# Patient Record
Sex: FEMALE | Race: WHITE | NOT HISPANIC OR LATINO | Employment: OTHER | URBAN - METROPOLITAN AREA
[De-identification: names, ages, dates, MRNs, and addresses within clinical notes are randomized per-mention and may not be internally consistent; named-entity substitution may affect disease eponyms.]

---

## 2017-02-01 ENCOUNTER — OFFICE VISIT (OUTPATIENT)
Dept: OBGYN | Facility: CLINIC | Age: 37
End: 2017-02-01
Payer: COMMERCIAL

## 2017-02-01 VITALS
WEIGHT: 155 LBS | BODY MASS INDEX: 26.46 KG/M2 | SYSTOLIC BLOOD PRESSURE: 112 MMHG | HEIGHT: 64 IN | DIASTOLIC BLOOD PRESSURE: 68 MMHG

## 2017-02-01 DIAGNOSIS — R53.83 FATIGUE, UNSPECIFIED TYPE: Primary | ICD-10-CM

## 2017-02-01 LAB
HGB BLD-MCNC: 14.5 G/DL (ref 11.7–15.7)
TSH SERPL DL<=0.05 MIU/L-ACNC: 0.94 MU/L (ref 0.4–4)

## 2017-02-01 PROCEDURE — 85018 HEMOGLOBIN: CPT | Performed by: OBSTETRICS & GYNECOLOGY

## 2017-02-01 PROCEDURE — 99213 OFFICE O/P EST LOW 20 MIN: CPT | Performed by: OBSTETRICS & GYNECOLOGY

## 2017-02-01 PROCEDURE — 36415 COLL VENOUS BLD VENIPUNCTURE: CPT | Performed by: OBSTETRICS & GYNECOLOGY

## 2017-02-01 PROCEDURE — 84443 ASSAY THYROID STIM HORMONE: CPT | Performed by: OBSTETRICS & GYNECOLOGY

## 2017-02-01 NOTE — PROGRESS NOTES
SUBJECTIVE:                                                   Janay Tam is a 36 year old female who presents to clinic today for the following health issue(s):  Patient presents with:  Thyroid Problem: Family history. Mom and sister has thyroid issues. Pt c/o fatigue, feeling down, left leg ache. Left breast ache.      Additional information:     HPI:  Having some fatigue that is more than usual. Sleeping well. No hair or GI changes.  Mood is a little down. No hx of depression. Thinks it may be the winter.   FH of thyroid disease in mom and sister.     Breast tenderness appears to be cyclic.    Patient's last menstrual period was 2017 (approximate)..   Patient is sexually active, .  Using vasectomy for contraception.    reports that she has never smoked. She has never used smokeless tobacco.    STD testing offered?  Declined    Health maintenance updated:  yes    Today's PHQ-2 Score:   PHQ-2 (  [employer]) 2016   Q1: Little interest or pleasure in doing things 0   Q2: Feeling down, depressed or hopeless 0   PHQ-2 Score 0     Today's PHQ-9 Score:   PHQ-9 SCORE 1/3/2012   Total Score 3     Today's MILA-7 Score: No flowsheet data found.    Problem list and histories reviewed & adjusted, as indicated.  Additional history: as documented.    Patient Active Problem List   Diagnosis     Allergic rhinitis     CARDIOVASCULAR SCREENING; LDL GOAL LESS THAN 160     Past Surgical History   Procedure Laterality Date     C iud,mirena  2010     No history of surgery        Social History   Substance Use Topics     Smoking status: Never Smoker      Smokeless tobacco: Never Used     Alcohol Use: No      Problem (# of Occurrences) Relation (Name,Age of Onset)    Arthritis (1) Paternal Grandmother    C.A.D. (2) Father: heart problems, Maternal Grandfather: heart problems    CEREBROVASCULAR DISEASE (2) Father, Paternal Grandmother    DIABETES (1) Maternal Grandfather    Depression (1) Mother     "Hyperlipidemia (1) Mother    Hypertension (1) Mother    Neurologic Disorder (2) Sister: mental health, Brother: mental health    OSTEOPOROSIS (1) Paternal Grandmother    Psychotic Disorder (1) Mother    Thyroid Disease (2) Mother, Sister            Current Outpatient Prescriptions   Medication Sig     Omega-3 Fatty Acids (FISH OIL PO)      MULTIVITAMINS OR TABS ONE DAILY     No current facility-administered medications for this visit.     Facility-Administered Medications Ordered in Other Visits   Medication     lidocaine-EPINEPHrine 1.5 %-1:[id number] injection     ROPivacaine (NAROPIN) injection     Allergies   Allergen Reactions     Penicillins        ROS:  12 point review of systems negative other than symptoms noted below.  Constitutional: Fatigue  Musculoskeletal: Muscle Cramps  Endocrine: Decreased Libido  Psychiatric: Depression    OBJECTIVE:     /68 mmHg  Ht 5' 4\" (1.626 m)  Wt 155 lb (70.308 kg)  BMI 26.59 kg/m2  LMP 01/20/2017 (Approximate)  Body mass index is 26.59 kg/(m^2).    Exam:  Constitutional:  Appearance: Well nourished, well developed alert, in no acute distress  Neurologic/Psychiatric:  Mental Status:  Oriented X3      In-Clinic Test Results:  Results for orders placed or performed in visit on 02/01/17 (from the past 24 hour(s))   Hemoglobin   Result Value Ref Range    Hemoglobin 14.5 11.7 - 15.7 g/dL       ASSESSMENT/PLAN:                                                        ICD-10-CM    1. Fatigue, unspecified type R53.83 Omega-3 Fatty Acids (FISH OIL PO)     TSH     Hemoglobin         Check labs. Observe mood for now. If no better in spring re-evaluate.  Mastalgia being cyclic is benign.       Sanjeev Pozo MD  [facility]    "

## 2017-02-01 NOTE — MR AVS SNAPSHOT
"              After Visit Summary   2/1/2017    Janay Tam    MRN: [id number]           Patient Information     Date Of Birth          1980        Visit Information        Provider Department      2/1/2017 10:00 AM Sanjeev Pozo MD [facility]        Today's Diagnoses     Fatigue, unspecified type    -  1        Follow-ups after your visit        Who to contact     If you have questions or need follow up information about today's clinic visit or your schedule please contact [facility] directly at [phone].  Normal or non-critical lab and imaging results will be communicated to you by BrandMe crowdmarketinghart, letter or phone within 4 business days after the clinic has received the results. If you do not hear from us within 7 days, please contact the clinic through Snapvinet or phone. If you have a critical or abnormal lab result, we will notify you by phone as soon as possible.  Submit refill requests through BlackStratus or call your pharmacy and they will forward the refill request to us. Please allow 3 business days for your refill to be completed.          Additional Information About Your Visit        MyChart Information     BlackStratus gives you secure access to your electronic health record. If you see a primary care provider, you can also send messages to your care team and make appointments. If you have questions, please call your primary care clinic.  If you do not have a primary care provider, please call [phone] and they will assist you.        Care EveryWhere ID     This is your Care EveryWhere ID. This could be used by other organizations to access your [place] medical records  [id number]        Your Vitals Were     Height BMI (Body Mass Index) Last Period             5' 4\" (1.626 m) 26.59 kg/m2 01/20/2017 (Approximate)          Blood Pressure from Last 3 Encounters:   02/01/17 112/68   12/06/16 108/68   05/31/16 106/70    Weight from Last 3 Encounters: "   02/01/17 155 lb (70.308 kg)   12/06/16 153 lb (69.4 kg)   05/31/16 145 lb 9.6 oz (66.044 kg)              We Performed the Following     Hemoglobin     TSH          Today's Medication Changes          These changes are accurate as of: 2/1/17 10:25 AM.  If you have any questions, ask your nurse or doctor.               Stop taking these medicines if you haven't already. Please contact your care team if you have questions.     nystatin-triamcinolone cream   Commonly known as:  MYCOLOG II   Stopped by:  Sanjeev Pozo MD                    Primary Care Provider Office Phone # Fax #    Eileen Barragan [phone]       [facility] [address] [id number]        Thank you!     Thank you for choosing [facility] [place]  for your care. Our goal is always to provide you with excellent care. Hearing back from our patients is one way we can continue to improve our services. Please take a few minutes to complete the written survey that you may receive in the mail after your visit with us. Thank you!             Your Updated Medication List - Protect others around you: Learn how to safely use, store and throw away your medicines at www.disposemymeds.org.          This list is accurate as of: 2/1/17 10:25 AM.  Always use your most recent med list.                   Brand Name Dispense Instructions for use    FISH OIL PO          multivitamin per tablet     100    ONE DAILY

## 2017-05-25 ENCOUNTER — OFFICE VISIT (OUTPATIENT)
Dept: OBGYN | Facility: CLINIC | Age: 37
End: 2017-05-25
Payer: COMMERCIAL

## 2017-05-25 VITALS
WEIGHT: 151.8 LBS | SYSTOLIC BLOOD PRESSURE: 116 MMHG | DIASTOLIC BLOOD PRESSURE: 76 MMHG | BODY MASS INDEX: 25.92 KG/M2 | HEIGHT: 64 IN

## 2017-05-25 DIAGNOSIS — B37.31 VAGINAL CANDIDA: ICD-10-CM

## 2017-05-25 DIAGNOSIS — Z01.419 ENCOUNTER FOR GYNECOLOGICAL EXAMINATION WITHOUT ABNORMAL FINDING: Primary | ICD-10-CM

## 2017-05-25 DIAGNOSIS — N89.8 ITCHING OF VAGINA: ICD-10-CM

## 2017-05-25 LAB
MICRO REPORT STATUS: ABNORMAL
SPECIMEN SOURCE: ABNORMAL
WET PREP SPEC: ABNORMAL

## 2017-05-25 PROCEDURE — G0145 SCR C/V CYTO,THINLAYER,RESCR: HCPCS | Performed by: NURSE PRACTITIONER

## 2017-05-25 PROCEDURE — 99395 PREV VISIT EST AGE 18-39: CPT | Performed by: NURSE PRACTITIONER

## 2017-05-25 PROCEDURE — 87210 SMEAR WET MOUNT SALINE/INK: CPT | Performed by: NURSE PRACTITIONER

## 2017-05-25 RX ORDER — FLUCONAZOLE 150 MG/1
150 TABLET ORAL ONCE
Qty: 1 TABLET | Refills: 0 | Status: SHIPPED | OUTPATIENT
Start: 2017-05-25 | End: 2017-05-25

## 2017-05-25 ASSESSMENT — ANXIETY QUESTIONNAIRES
1. FEELING NERVOUS, ANXIOUS, OR ON EDGE: SEVERAL DAYS
6. BECOMING EASILY ANNOYED OR IRRITABLE: SEVERAL DAYS
GAD7 TOTAL SCORE: 7
3. WORRYING TOO MUCH ABOUT DIFFERENT THINGS: SEVERAL DAYS
IF YOU CHECKED OFF ANY PROBLEMS ON THIS QUESTIONNAIRE, HOW DIFFICULT HAVE THESE PROBLEMS MADE IT FOR YOU TO DO YOUR WORK, TAKE CARE OF THINGS AT HOME, OR GET ALONG WITH OTHER PEOPLE: NOT DIFFICULT AT ALL
2. NOT BEING ABLE TO STOP OR CONTROL WORRYING: SEVERAL DAYS
7. FEELING AFRAID AS IF SOMETHING AWFUL MIGHT HAPPEN: SEVERAL DAYS
5. BEING SO RESTLESS THAT IT IS HARD TO SIT STILL: SEVERAL DAYS

## 2017-05-25 ASSESSMENT — PATIENT HEALTH QUESTIONNAIRE - PHQ9: 5. POOR APPETITE OR OVEREATING: SEVERAL DAYS

## 2017-05-25 NOTE — PROGRESS NOTES
Janay is a 36 year old  female who presents for annual exam.     Besides routine health maintenance,  she would like to discuss vaginal itching.    HPI:  The patient's PCP is  Eileen Barragan DO.  Pt here for her annual exam today. She still struggles with left breast tenderness that seems to be better with limiting her caffeine, under wire bras and she thinks it is cyclic. She also suffers from menstrual headaches, PMS and vaginal itching with her menses.     She had a Mirena IUD years ago. Her  has had a vasectomy.    GYNECOLOGIC HISTORY:    Patient's last menstrual period was 2017 (exact date).  Her current contraception method is: vasectomy.  She  reports that she has never smoked. She has never used smokeless tobacco.    Patient is sexually active.  STD testing offered?  Declined  Last PHQ-9 score on record =   PHQ-9 SCORE 2017   Total Score -   Total Score 2     Last GAD7 score on record =   MILA-7 SCORE 2017   Total Score 7     Alcohol Score = 3    HEALTH MAINTENANCE:  Cholesterol: (  Cholesterol   Date Value Ref Range Status   2005 148 0 - 200 mg/dL Final     Comment:     Cholesterol Reference Range:   <200  The NCEP recommends further         evaluation of:         1.  Patients with cholesterol             greater than 200 mg/dL             if additional risk factors             are present.         2.  All patients with a             cholesterol greater than             240 mg/dL.      Last Mammo: 2016, Result: normal, Next Mammo: age 39   Pap: (  Lab Results   Component Value Date    PAP NIL 2014    PAP NIL 2012    PAP NIL 10/21/2009    )   Colonoscopy:  NA, Result: not applicable, Next Colonoscopy: age 49  Dexa:  NA    Health maintenance updated:  yes    HISTORY:  Obstetric History       T3      L3     SAB0   TAB0   Ectopic0   Multiple0   Live Births0       # Outcome Date GA Lbr John/2nd Weight Sex Delivery Anes PTL Lv   3 Term 02/10/15 38w5d  "01:15 / 03:05 8 lb 10 oz (3.912 kg) M Vag-Spont EPI  EDU      Apgar1:  8                Apgar5: 9   2 Term 10/09/10 39w0d  7 lb 14 oz (3.572 kg) M  EPI  EDU   1 Term 10/31/08 38w0d  7 lb 4 oz (3.289 kg) M    EDU          Patient Active Problem List   Diagnosis     Allergic rhinitis     CARDIOVASCULAR SCREENING; LDL GOAL LESS THAN 160     Past Surgical History:   Procedure Laterality Date     C IUD,MIRENA  2010     NO HISTORY OF SURGERY        Social History   Substance Use Topics     Smoking status: Never Smoker     Smokeless tobacco: Never Used     Alcohol use No      Problem (# of Occurrences) Relation (Name,Age of Onset)    Arthritis (1) Paternal Grandmother    C.A.D. (2) Father: heart problems, Maternal Grandfather: heart problems    CEREBROVASCULAR DISEASE (2) Father, Paternal Grandmother    DIABETES (1) Maternal Grandfather    Depression (1) Mother    Hyperlipidemia (1) Mother    Hypertension (1) Mother    Neurologic Disorder (2) Sister: mental health, Brother: mental health    OSTEOPOROSIS (1) Paternal Grandmother    Psychotic Disorder (1) Mother    Thyroid Disease (2) Mother, Sister            Current Outpatient Prescriptions   Medication Sig     fluconazole (DIFLUCAN) 150 MG tablet Take 1 tablet (150 mg) by mouth once for 1 dose     Omega-3 Fatty Acids (FISH OIL PO)      MULTIVITAMINS OR TABS ONE DAILY     No current facility-administered medications for this visit.      Facility-Administered Medications Ordered in Other Visits   Medication     lidocaine-EPINEPHrine 1.5 %-1:479906 injection     ROPivacaine (NAROPIN) injection     Allergies   Allergen Reactions     Penicillins        Past medical, surgical, social and family histories were reviewed and updated in EPIC.    ROS:   12 point review of systems negative other than symptoms noted below.  Breast: Tenderness  Genitourinary: Vaginal Itching    EXAM:  /76  Ht 5' 4\" (1.626 m)  Wt 151 lb 12.8 oz (68.9 kg)  LMP 2017 (Exact Date) "  BMI 26.06 kg/m2   BMI: Body mass index is 26.06 kg/(m^2).    PHYSICAL EXAM:  Constitutional:  Appearance: Well nourished, well developed, alert, in no acute distress  Neck:  Lymph Nodes:  No lymphadenopathy present    Thyroid:  Gland size normal, nontender, no nodules or masses present  on palpation  Chest:  Respiratory Effort:  Breathing unlabored  Cardiovascular:    Heart: Auscultation:  Regular rate, normal rhythm, no murmurs present  Breasts: Inspection of Breasts:  No lymphadenopathy present    Palpation of Breasts and Axillae:  No masses present on palpation, no  breast tenderness    Axillary Lymph Nodes:  No lymphadenopathy present  Gastrointestinal:   Abdominal Examination:  Abdomen nontender to palpation, tone normal without rigidity or guarding, no masses present, umbilicus without lesions   Liver and Spleen:  No hepatomegaly present, liver nontender to palpation    Hernias:  No hernias present  Lymphatic: Lymph Nodes:  No other lymphadenopathy present  Skin:  General Inspection:  No rashes present, no lesions present, no areas of  discoloration    Genitalia and Groin:  No rashes present, no lesions present, no areas of  discoloration, no masses present  Neurologic/Psychiatric:    Mental Status:  Oriented X3     Pelvic Exam:  External Genitalia:     Normal appearance for age, no discharge present, no tenderness present, no inflammatory lesions present, color normal  Vagina:     Normal vaginal vault without central or paravaginal defects, no discharge present, no inflammatory lesions present, no masses present  Bladder:     Nontender to palpation  Urethra:   Urethral Body:  Urethra palpation normal, urethra structural support normal   Urethral Meatus:  No erythema or lesions present  Cervix:     Appearance healthy, no lesions present, nontender to palpation, no bleeding present  Uterus:     Nontender to palpation, no masses present, position anteflexed, mobility: normal  Adnexa:     No adnexal tenderness  present, no adnexal masses present  Perineum:     Perineum within normal limits, no evidence of trauma, no rashes or skin lesions present  Anus:     Anus within normal limits, no hemorrhoids present  Inguinal Lymph Nodes:     No lymphadenopathy present  Pubic Hair:     Normal pubic hair distribution for age  Genitalia and Groin:     No rashes present, no lesions present, no areas of discoloration, no masses present    COUNSELING:   Special attention given to:        Regular exercise       Healthy diet/nutrition       Cycle control    BMI: Body mass index is 26.06 kg/(m^2).  Weight management plan: Discussed healthy diet and exercise guidelines and patient will follow up in 12 months in clinic to re-evaluate.   Results for orders placed or performed in visit on 05/25/17   Wet prep   Result Value Ref Range    Specimen Description Vagina     Wet Prep (A)      Yeast seen  No clue cells seen  No Trichomonas seen      Micro Report Status FINAL 05/25/2017          ASSESSMENT:  36 year old female with satisfactory annual exam.    ICD-10-CM    1. Encounter for gynecological examination without abnormal finding Z01.419 Pap imaged thin layer screen reflex to HPV if ASCUS - recommended age 25 - 29 years   2. Itching of vagina L29.8 Wet prep   3. Vaginal candida B37.3 fluconazole (DIFLUCAN) 150 MG tablet       PLAN:  Annual pap done today. mammo at 39. Discussed cyclic breast tenderness, PMS, and vaginal itching, recommended pt have Mirena placed again for cycle control. She will schedule this.  Diflucan for vaginal yeast on wet prep.    OLIVIA Kee CNP

## 2017-05-25 NOTE — MR AVS SNAPSHOT
"              After Visit Summary   5/25/2017    Janay Tam    MRN: [id number]           Patient Information     Date Of Birth          1980        Visit Information        Provider Department      5/25/2017 11:30 AM Candace Gold APRN CNP [facility]        Today's Diagnoses     Encounter for gynecological examination without abnormal finding    -  1    Itching of vagina        Vaginal candida           Follow-ups after your visit        Who to contact     If you have questions or need follow up information about today's clinic visit or your schedule please contact [facility] directly at [phone].  Normal or non-critical lab and imaging results will be communicated to you by Catapult Healthhart, letter or phone within 4 business days after the clinic has received the results. If you do not hear from us within 7 days, please contact the clinic through Catapult Healthhart or phone. If you have a critical or abnormal lab result, we will notify you by phone as soon as possible.  Submit refill requests through Chevia or call your pharmacy and they will forward the refill request to us. Please allow 3 business days for your refill to be completed.          Additional Information About Your Visit        MyChart Information     Chevia gives you secure access to your electronic health record. If you see a primary care provider, you can also send messages to your care team and make appointments. If you have questions, please call your primary care clinic.  If you do not have a primary care provider, please call [phone] and they will assist you.        Care EveryWhere ID     This is your Care EveryWhere ID. This could be used by other organizations to access your [place] medical records  [id number]        Your Vitals Were     Height Last Period BMI (Body Mass Index)             5' 4\" (1.626 m) 04/20/2017 (Exact Date) 26.06 kg/m2          Blood Pressure from Last 3 Encounters: "   05/25/17 116/76   02/01/17 112/68   12/06/16 108/68    Weight from Last 3 Encounters:   05/25/17 151 lb 12.8 oz (68.9 kg)   02/01/17 155 lb (70.3 kg)   12/06/16 153 lb (69.4 kg)              We Performed the Following     Pap imaged thin layer screen reflex to HPV if ASCUS - recommended age 25 - 29 years     Wet prep          Today's Medication Changes          These changes are accurate as of: 5/25/17 12:04 PM.  If you have any questions, ask your nurse or doctor.               Start taking these medicines.        Dose/Directions    fluconazole 150 MG tablet   Commonly known as:  DIFLUCAN   Used for:  Vaginal candida   Started by:  Candace Gold APRN CNP        Dose:  150 mg   Take 1 tablet (150 mg) by mouth once for 1 dose   Quantity:  1 tablet   Refills:  0            Where to get your medicines      These medications were sent to [facility] - [place], [place] - [address] [facility]  [address] [facility] [address]     Phone:  [phone]     fluconazole 150 MG tablet                Primary Care Provider Office Phone # Fax #    Eileen Barragan [phone]       [facility] [address]  [facility] [address]        Thank you!     Thank you for choosing [facility] [place]  for your care. Our goal is always to provide you with excellent care. Hearing back from our patients is one way we can continue to improve our services. Please take a few minutes to complete the written survey that you may receive in the mail after your visit with us. Thank you!             Your Updated Medication List - Protect others around you: Learn how to safely use, store and throw away your medicines at www.disposemymeds.org.          This list is accurate as of: 5/25/17 12:04 PM.  Always use your most recent med list.                   Brand Name Dispense Instructions for use    FISH OIL PO          fluconazole 150 MG tablet    DIFLUCAN    1 tablet    Take 1 tablet (150  mg) by mouth once for 1 dose       multivitamin per tablet     100    ONE DAILY

## 2017-05-26 ASSESSMENT — ANXIETY QUESTIONNAIRES: GAD7 TOTAL SCORE: 7

## 2017-05-26 ASSESSMENT — PATIENT HEALTH QUESTIONNAIRE - PHQ9: SUM OF ALL RESPONSES TO PHQ QUESTIONS 1-9: 2

## 2017-05-30 LAB
COPATH REPORT: NORMAL
PAP: NORMAL

## 2017-07-14 ENCOUNTER — OFFICE VISIT (OUTPATIENT)
Dept: OBGYN | Facility: CLINIC | Age: 37
End: 2017-07-14
Payer: COMMERCIAL

## 2017-07-14 VITALS
HEIGHT: 64 IN | DIASTOLIC BLOOD PRESSURE: 70 MMHG | BODY MASS INDEX: 25.92 KG/M2 | WEIGHT: 151.8 LBS | SYSTOLIC BLOOD PRESSURE: 94 MMHG

## 2017-07-14 DIAGNOSIS — Z01.812 PRE-PROCEDURE LAB EXAM: Primary | ICD-10-CM

## 2017-07-14 DIAGNOSIS — Z30.430 ENCOUNTER FOR IUD INSERTION: ICD-10-CM

## 2017-07-14 LAB — BETA HCG QUAL IFA URINE: NEGATIVE

## 2017-07-14 PROCEDURE — 84703 CHORIONIC GONADOTROPIN ASSAY: CPT | Performed by: OBSTETRICS & GYNECOLOGY

## 2017-07-14 PROCEDURE — 58300 INSERT INTRAUTERINE DEVICE: CPT | Performed by: OBSTETRICS & GYNECOLOGY

## 2017-07-14 NOTE — MR AVS SNAPSHOT
"              After Visit Summary   7/14/2017    Janay Tam    MRN: [id number]           Patient Information     Date Of Birth          1980        Visit Information        Provider Department      7/14/2017 8:40 AM Sanjeev Pozo MD [facility]        Today's Diagnoses     Pre-procedure lab exam    -  1    Encounter for IUD insertion           Follow-ups after your visit        Who to contact     If you have questions or need follow up information about today's clinic visit or your schedule please contact [facility] directly at [phone].  Normal or non-critical lab and imaging results will be communicated to you by ZoopShophart, letter or phone within 4 business days after the clinic has received the results. If you do not hear from us within 7 days, please contact the clinic through Stantumt or phone. If you have a critical or abnormal lab result, we will notify you by phone as soon as possible.  Submit refill requests through NetDevices or call your pharmacy and they will forward the refill request to us. Please allow 3 business days for your refill to be completed.          Additional Information About Your Visit        MyChart Information     NetDevices gives you secure access to your electronic health record. If you see a primary care provider, you can also send messages to your care team and make appointments. If you have questions, please call your primary care clinic.  If you do not have a primary care provider, please call [phone] and they will assist you.        Care EveryWhere ID     This is your Care EveryWhere ID. This could be used by other organizations to access your [place] medical records  [id number]        Your Vitals Were     Height Last Period BMI (Body Mass Index)             5' 4\" (1.626 m) 07/10/2017 (Exact Date) 26.06 kg/m2          Blood Pressure from Last 3 Encounters:   07/14/17 94/70   05/25/17 116/76   02/01/17 112/68    " Weight from Last 3 Encounters:   07/14/17 151 lb 12.8 oz (68.9 kg)   05/25/17 151 lb 12.8 oz (68.9 kg)   02/01/17 155 lb (70.3 kg)              We Performed the Following     Beta HCG qual IFA urine     HC LEVONORGESTREL IU 52MG 5 YR     INSERTION INTRAUTERINE DEVICE          Today's Medication Changes          These changes are accurate as of: 7/14/17  9:27 AM.  If you have any questions, ask your nurse or doctor.               Start taking these medicines.        Dose/Directions    levonorgestrel 20 MCG/24HR IUD   Commonly known as:  MIRENA (52 MG)   Used for:  Encounter for IUD insertion   Started by:  Sanjeev Pozo MD        Dose:  1 each   1 each (20 mcg) by Intrauterine route once for 1 dose   Quantity:  1 each   Refills:  0            Where to get your medicines      Some of these will need a paper prescription and others can be bought over the counter.  Ask your nurse if you have questions.     You don't need a prescription for these medications     levonorgestrel 20 MCG/24HR IUD                Primary Care Provider Office Phone # Fax #    Eileen MCKEON DO [place] [phone]       [facility] [address] [address]        Equal Access to Services     BRENNA HEART AH: Hadii mert rizzo hadasho Soomaali, waaxda luqadaha, qaybta kaalmada adeegyada, lionel bautista. So [facility] [phone].    ATENCIÓN: Si habla español, tiene a linton disposición servicios gratuitos de asistencia lingüística. Llame al [phone].    We comply with applicable federal civil rights laws and [place] laws. We do not discriminate on the basis of race, color, national origin, age, disability sex, sexual orientation or gender identity.            Thank you!     Thank you for choosing [facility]  for your care. Our goal is always to provide you with excellent care. Hearing back from our patients is one way we can continue to improve our services.  Please take a few minutes to complete the written survey that you may receive in the mail after your visit with us. Thank you!             Your Updated Medication List - Protect others around you: Learn how to safely use, store and throw away your medicines at www.disposemymeds.org.          This list is accurate as of: 7/14/17  9:27 AM.  Always use your most recent med list.                   Brand Name Dispense Instructions for use Diagnosis    FISH OIL PO       Fatigue, unspecified type       levonorgestrel 20 MCG/24HR IUD    MIRENA (52 MG)    1 each    1 each (20 mcg) by Intrauterine route once for 1 dose    Encounter for IUD insertion       multivitamin per tablet     100    ONE DAILY

## 2017-07-14 NOTE — PROGRESS NOTES
Pt having cyclic menstrual headache and PMS mood changes. Hx of Mirena in past with no cyclic symptoms. Unsure whether she had these same symptoms with spontaneous cycles years ago.  Explained lack of Ovarian suppression with IUD and possible continued ovulation. Pt declines OCPs so will see if this works.    INDICATIONS:                                                      Is a pregnancy test required: Yes.  Was it positive or negative?  Negative  Was a consent obtained?  Yes    Janay Tam is a 36 year old female,   who presents for insertion of an IUD. The risks, benefits and alternatives of IUD insertion were discussed in detail previously. She also has reviewed the product brochure.  She has elected to go ahead with the insertion  today and her questions were answered. Consent was signed. Her LMP began on 7/10/17 and was normal in duration and amount of flow. A pregnancy test was performed today:  Yes    Today's PHQ-2 Score:   PHQ-2 (  [employer]) 2016   Q1: Little interest or pleasure in doing things 0   Q2: Feeling down, depressed or hopeless 0   PHQ-2 Score 0       PROCEDURE:                                                      The pelvic exam revealed normal external genitalia. On bimanual exam the uterus was Anteverted and normal in size with no tenderness present. A speculum was inserted into the vagina and the cervix was visualized. The cervix was prepped with Betadine . The anterior lip of the cervix was grasped with a single toothed tenaculum. The uterus sounded to 8 cm. A Mirena IUD was then inserted without difficulty. The string was cut to 4 cm.  The patient experienced a mild amount of cramping.    POST PROCEDURE:                                                      She  tolerated the procedure well. There were no complications. Patient was discharged in stable condition.    Return to clinic in 5 weeks for IUD check.  Call if severe cramping, fever, abnormal bleeding, abnormal  discharge or pelvic pain develop.  Patient was counseled about the chance of irregular bleeding.    Sanjeev Pozo MD

## 2017-08-03 ENCOUNTER — TELEPHONE (OUTPATIENT)
Dept: NURSING | Facility: CLINIC | Age: 37
End: 2017-08-03

## 2017-08-03 NOTE — TELEPHONE ENCOUNTER
Pt callin in with questions regasrding spotting after having her Mirena IUD placed. She indicates tht she has noticed occasional breast achiness , cramps and spotting that she would consider a light period. Reviewed with the patient that it is probably the uterine lining/ body  getting use to the progesterone.She denies passing any clots and only changes her tampon once a day. I also explained to the the pt that this can last up to 3-4 months. Pt voiced understanding and she was instructed to cb if she should develop any heavy bleeding/pain or if she should have additional questions.

## 2017-08-08 ENCOUNTER — OFFICE VISIT (OUTPATIENT)
Dept: OBGYN | Facility: CLINIC | Age: 37
End: 2017-08-08
Payer: COMMERCIAL

## 2017-08-08 VITALS — BODY MASS INDEX: 26.09 KG/M2 | DIASTOLIC BLOOD PRESSURE: 70 MMHG | WEIGHT: 152 LBS | SYSTOLIC BLOOD PRESSURE: 108 MMHG

## 2017-08-08 DIAGNOSIS — N64.4 MASTALGIA: ICD-10-CM

## 2017-08-08 DIAGNOSIS — Z97.5 PRESENCE OF INTRAUTERINE CONTRACEPTIVE DEVICE: Primary | ICD-10-CM

## 2017-08-08 DIAGNOSIS — R14.0 BLOATING: ICD-10-CM

## 2017-08-08 DIAGNOSIS — N93.8 DUB (DYSFUNCTIONAL UTERINE BLEEDING): ICD-10-CM

## 2017-08-08 PROCEDURE — 99213 OFFICE O/P EST LOW 20 MIN: CPT | Performed by: OBSTETRICS & GYNECOLOGY

## 2017-08-08 NOTE — PROGRESS NOTES
SUBJECTIVE:                                                   Janay Tam is a 36 year old female who presents to clinic today for the following health issue(s):  Patient presents with:  IUD: bleeding, breast tenderness      Additional information: does not feel normal    HPI:  Having issues since IUD insertion last months.   Having bleeding, which can be heavy, cramping, bloating, mastalgia.  No PMS this month.  IUD inserted after menses. Pt still w/in a menstrual cycle.   Cramping mild.   Concerned now about possible risks of IUD  Hx of mirena with no side effects.      Patient's last menstrual period was 07/10/2017 (exact date)..   Patient is sexually active, .  Using IUD for contraception.    reports that she has never smoked. She has never used smokeless tobacco.      STD testing offered?  Declined    Health maintenance updated:  yes    Today's PHQ-2 Score:   PHQ-2 (  [employer]) 2016   Q1: Little interest or pleasure in doing things 0   Q2: Feeling down, depressed or hopeless 0   PHQ-2 Score 0     Today's PHQ-9 Score:   PHQ-9 SCORE 2017   Total Score -   Total Score 2     Today's MILA-7 Score:   MILA-7 SCORE 2017   Total Score 7       Problem list and histories reviewed & adjusted, as indicated.  Additional history: as documented.    Patient Active Problem List   Diagnosis     Allergic rhinitis     CARDIOVASCULAR SCREENING; LDL GOAL LESS THAN 160     Past Surgical History:   Procedure Laterality Date     C IUD,MIRENA  2010     NO HISTORY OF SURGERY        Social History   Substance Use Topics     Smoking status: Never Smoker     Smokeless tobacco: Never Used     Alcohol use No      Problem (# of Occurrences) Relation (Name,Age of Onset)    Arthritis (1) Paternal Grandmother    C.A.D. (2) Father: heart problems, Maternal Grandfather: heart problems    CEREBROVASCULAR DISEASE (2) Father, Paternal Grandmother    DIABETES (1) Maternal Grandfather    Depression (1) Mother     Hyperlipidemia (1) Mother    Hypertension (1) Mother    Neurologic Disorder (2) Sister: mental health, Brother: mental health    OSTEOPOROSIS (1) Paternal Grandmother    Psychotic Disorder (1) Mother    Thyroid Disease (2) Mother, Sister            Current Outpatient Prescriptions   Medication Sig     Omega-3 Fatty Acids (FISH OIL PO)      MULTIVITAMINS OR TABS ONE DAILY     levonorgestrel (MIRENA, 52 MG,) 20 MCG/24HR IUD 1 each (20 mcg) by Intrauterine route once for 1 dose     No current facility-administered medications for this visit.      Facility-Administered Medications Ordered in Other Visits   Medication     lidocaine-EPINEPHrine 1.5 %-1:[id number] injection     ROPivacaine (NAROPIN) injection     Allergies   Allergen Reactions     Penicillins        ROS:  12 point review of systems negative other than symptoms noted below.  Breast: Tenderness  Gastrointestinal: Abdominal Pain and Bloating  Genitourinary: Cramps and Irregular Menses  Neurologic: Dizziness  Psychiatric: Difficulty Sleeping    OBJECTIVE:     /70  Wt 152 lb (68.9 kg)  LMP 07/10/2017 (Exact Date)  BMI 26.09 kg/m2  Body mass index is 26.09 kg/(m^2).    Exam:  Constitutional:  Appearance: Well nourished, well developed alert, in no acute distress  Neurologic/Psychiatric:  Mental Status:  Oriented X3   Pelvic Exam:  External Genitalia:     Normal appearance for age, no discharge present, no tenderness present, no inflammatory lesions present, color normal  Vagina:    Normal vaginal vault without central or paravaginal defects, no discharge present, no inflammatory lesions present, no masses present  Bladder:     Nontender to palpation  Urethra:   Urethral Body:  Urethra palpation normal, urethra structural support normal   Urethral Meatus:  No erythema or lesions present  Cervix:     Appearance healthy, no lesions present, nontender to palpation, no bleeding present, string present  Uterus:     Nontender to palpation, no masses present,  position anteflexed, mobility: normal  Adnexa:     No adnexal tenderness present, no adnexal masses present  Perineum:     Perineum within normal limits, no evidence of trauma, no rashes or skin lesions present  Anus:     Anus within normal limits, no hemorrhoids present  Inguinal Lymph Nodes:     No lymphadenopathy present  Pubic Hair:     Normal pubic hair distribution for age  Genitalia and Groin:     No rashes present, no lesions present, no areas of discoloration, no masses present       In-Clinic Test Results:  No results found for this or any previous visit (from the past 24 hour(s)).    ASSESSMENT/PLAN:                                                        ICD-10-CM    1. Presence of intrauterine contraceptive device Z97.5    2. DUB (dysfunctional uterine bleeding) N93.8    3. Bloating R14.0    4. Mastalgia N64.4        Reassured that spotting in first month is normal. Pt may or may not have ovulated this month. May be giving symptoms of bloating and mastalgia. Hx of mirena usage with no side effects. Most likely symptoms will improve this month.  Will observe for now and call if persists or worsens.  Reviewed potential benefits of IUD vs very low risks.    Sanjeev Pozo MD  [facility]

## 2017-08-08 NOTE — MR AVS SNAPSHOT
After Visit Summary   8/8/2017    Janay Tam    MRN: [id number]           Patient Information     Date Of Birth          1980        Visit Information        Provider Department      8/8/2017 11:50 AM Sanjeev Pozo MD [facility]        Today's Diagnoses     Presence of intrauterine contraceptive device    -  1    DUB (dysfunctional uterine bleeding)        Bloating        Mastalgia           Follow-ups after your visit        Who to contact     If you have questions or need follow up information about today's clinic visit or your schedule please contact [facility] directly at [phone].  Normal or non-critical lab and imaging results will be communicated to you by Voice123hart, letter or phone within 4 business days after the clinic has received the results. If you do not hear from us within 7 days, please contact the clinic through Voice123hart or phone. If you have a critical or abnormal lab result, we will notify you by phone as soon as possible.  Submit refill requests through [employer] or call your pharmacy and they will forward the refill request to us. Please allow 3 business days for your refill to be completed.          Additional Information About Your Visit        MyChart Information     [employer] gives you secure access to your electronic health record. If you see a primary care provider, you can also send messages to your care team and make appointments. If you have questions, please call your primary care clinic.  If you do not have a primary care provider, please call [phone] and they will assist you.        Care EveryWhere ID     This is your Care EveryWhere ID. This could be used by other organizations to access your [facility] medical records  [id number]        Your Vitals Were     Last Period BMI (Body Mass Index)                07/10/2017 (Exact Date) 26.09 kg/m2           Blood Pressure from Last 3 Encounters:   08/08/17  108/70   07/14/17 94/70   05/25/17 116/76    Weight from Last 3 Encounters:   08/08/17 152 lb (68.9 kg)   07/14/17 151 lb 12.8 oz (68.9 kg)   05/25/17 151 lb 12.8 oz (68.9 kg)              Today, you had the following     No orders found for display       Primary Care Provider Office Phone # Fax #    Eileen Barragan [phone]       [facility] [address] [facility]  [address]  [facility] [address]        Equal Access to Services     TOM HEART : Hadii aad ku hadasho Soomaali, waaxda luqadaha, qaybta kaalmada adeegyada, lionel bhatti haymirna ren . So [facility] [phone].    ATENCIÓN: Si habla español, tiene a linton disposición servicios gratuitos de asistencia lingüística. Llame al [phone].    We comply with applicable federal civil rights laws and [place] laws. We do not discriminate on the basis of race, color, national origin, age, disability sex, sexual orientation or gender identity.            Thank you!     Thank you for choosing [facility]  for your care. Our goal is always to provide you with excellent care. Hearing back from our patients is one way we can continue to improve our services. Please take a few minutes to complete the written survey that you may receive in the mail after your visit with us. Thank you!             Your Updated Medication List - Protect others around you: Learn how to safely use, store and throw away your medicines at www.disposemymeds.org.          This list is accurate as of: 8/8/17 12:32 PM.  Always use your most recent med list.                   Brand Name Dispense Instructions for use Diagnosis    FISH OIL PO       Fatigue, unspecified type       levonorgestrel 20 MCG/24HR IUD    MIRENA (52 MG)    1 each    1 each (20 mcg) by Intrauterine route once for 1 dose    Encounter for IUD insertion       multivitamin per tablet     100    ONE DAILY

## 2017-09-20 ENCOUNTER — OFFICE VISIT (OUTPATIENT)
Dept: OBGYN | Facility: CLINIC | Age: 37
End: 2017-09-20
Payer: COMMERCIAL

## 2017-09-20 VITALS — DIASTOLIC BLOOD PRESSURE: 60 MMHG | BODY MASS INDEX: 26.26 KG/M2 | WEIGHT: 153 LBS | SYSTOLIC BLOOD PRESSURE: 108 MMHG

## 2017-09-20 DIAGNOSIS — R30.0 DYSURIA: Primary | ICD-10-CM

## 2017-09-20 LAB
ALBUMIN UR-MCNC: NEGATIVE MG/DL
APPEARANCE UR: CLEAR
BILIRUB UR QL STRIP: NEGATIVE
COLOR UR AUTO: YELLOW
GLUCOSE UR STRIP-MCNC: NEGATIVE MG/DL
HGB UR QL STRIP: ABNORMAL
KETONES UR STRIP-MCNC: NEGATIVE MG/DL
LEUKOCYTE ESTERASE UR QL STRIP: ABNORMAL
NITRATE UR QL: NEGATIVE
NON-SQ EPI CELLS #/AREA URNS LPF: ABNORMAL /LPF
PH UR STRIP: 7 PH (ref 5–7)
RBC #/AREA URNS AUTO: ABNORMAL /HPF
SOURCE: ABNORMAL
SP GR UR STRIP: 1.01 (ref 1–1.03)
UROBILINOGEN UR STRIP-ACNC: 0.2 EU/DL (ref 0.2–1)
WBC #/AREA URNS AUTO: ABNORMAL /HPF

## 2017-09-20 PROCEDURE — 99213 OFFICE O/P EST LOW 20 MIN: CPT | Performed by: NURSE PRACTITIONER

## 2017-09-20 PROCEDURE — 81001 URINALYSIS AUTO W/SCOPE: CPT | Performed by: NURSE PRACTITIONER

## 2017-09-20 PROCEDURE — 87086 URINE CULTURE/COLONY COUNT: CPT | Performed by: NURSE PRACTITIONER

## 2017-09-20 RX ORDER — NITROFURANTOIN 25; 75 MG/1; MG/1
100 CAPSULE ORAL 2 TIMES DAILY
Qty: 10 CAPSULE | Refills: 0 | Status: SHIPPED | OUTPATIENT
Start: 2017-09-20 | End: 2018-05-09

## 2017-09-20 NOTE — PROGRESS NOTES
SUBJECTIVE:                                                   Janay Tam is a 37 year old female who presents to clinic today for the following health issue(s):  Patient presents with:  UTI      Additional information: has had UTI's in past, now with painful urination    HPI: Patient c/o frequency, urgency and dysuria with urination.  Denies any vaginal symptons.      Patient's last menstrual period was 2017 (approximate)..   Patient is sexually active, .  Using IUD for contraception.    reports that she has never smoked. She has never used smokeless tobacco.      STD testing offered?  Declined    Health maintenance updated:  yes    Today's PHQ-2 Score:   PHQ-2 (  [employer]) 2016   Q1: Little interest or pleasure in doing things 0   Q2: Feeling down, depressed or hopeless 0   PHQ-2 Score 0     Today's PHQ-9 Score:   PHQ-9 SCORE 2017   Total Score -   Total Score 2     Today's MILA-7 Score:   MILA-7 SCORE 2017   Total Score 7       Problem list and histories reviewed & adjusted, as indicated.  Additional history: as documented.    Patient Active Problem List   Diagnosis     Allergic rhinitis     CARDIOVASCULAR SCREENING; LDL GOAL LESS THAN 160     Past Surgical History:   Procedure Laterality Date     C IUD,MIRENA  2010     NO HISTORY OF SURGERY        Social History   Substance Use Topics     Smoking status: Never Smoker     Smokeless tobacco: Never Used     Alcohol use No      Problem (# of Occurrences) Relation (Name,Age of Onset)    Arthritis (1) Paternal Grandmother    C.A.D. (2) Father: heart problems, Maternal Grandfather: heart problems    CEREBROVASCULAR DISEASE (2) Father, Paternal Grandmother    DIABETES (1) Maternal Grandfather    Depression (1) Mother    Hyperlipidemia (1) Mother    Hypertension (1) Mother    Neurologic Disorder (2) Sister: mental health, Brother: mental health    OSTEOPOROSIS (1) Paternal Grandmother    Psychotic Disorder (1) Mother    Thyroid  Disease (2) Mother, Sister            Current Outpatient Prescriptions   Medication Sig     nitroFURantoin, macrocrystal-monohydrate, (MACROBID) 100 MG capsule Take 1 capsule (100 mg) by mouth 2 times daily     Omega-3 Fatty Acids (FISH OIL PO)      MULTIVITAMINS OR TABS ONE DAILY     levonorgestrel (MIRENA, 52 MG,) 20 MCG/24HR IUD 1 each (20 mcg) by Intrauterine route once for 1 dose     No current facility-administered medications for this visit.      Facility-Administered Medications Ordered in Other Visits   Medication     lidocaine-EPINEPHrine 1.5 %-1:128574 injection     ROPivacaine (NAROPIN) injection     Allergies   Allergen Reactions     Penicillins        ROS:  12 point review of systems negative other than symptoms noted below.  Genitourinary: Painful Urination and Urgency    OBJECTIVE:     /60  Wt 153 lb (69.4 kg)  LMP 09/01/2017 (Approximate)  BMI 26.26 kg/m2  Body mass index is 26.26 kg/(m^2).    Exam:  Constitutional:  Appearance: Well nourished, well developed alert, in no acute distress     In-Clinic Test Results:  Results for orders placed or performed in visit on 09/20/17 (from the past 24 hour(s))   UA with Microscopic   Result Value Ref Range    Color Urine Yellow     Appearance Urine Clear     Glucose Urine Negative NEG^Negative mg/dL    Bilirubin Urine Negative NEG^Negative    Ketones Urine Negative NEG^Negative mg/dL    Specific Gravity Urine 1.010 1.003 - 1.035    pH Urine 7.0 5.0 - 7.0 pH    Protein Albumin Urine Negative NEG^Negative mg/dL    Urobilinogen Urine 0.2 0.2 - 1.0 EU/dL    Nitrite Urine Negative NEG^Negative    Blood Urine Trace (A) NEG^Negative    Leukocyte Esterase Urine 1+ (A) NEG^Negative    Source Midstream Urine     WBC Urine O - 2 OTO2^O - 2 /HPF    RBC Urine O - 2 OTO2^O - 2 /HPF    Squamous Epithelial /LPF Urine Few FEW^Few /LPF     Discussed UA results.  ASSESSMENT/PLAN:                                                        ICD-10-CM    1. Dysuria R30.0 UA  with Microscopic     nitroFURantoin, macrocrystal-monohydrate, (MACROBID) 100 MG capsule     Urine Culture Aerobic Bacterial       There are no Patient Instructions on file for this visit.    Patient to increase water intake, cranberry juice.  Will start on macrobid.    Send UC.    OLIVIA Rodriguez [facility]

## 2017-09-20 NOTE — MR AVS SNAPSHOT
After Visit Summary   9/20/2017    Janay Tam    MRN: [id number]           Patient Information     Date Of Birth          1980        Visit Information        Provider Department      9/20/2017 11:00 AM Shayna Atkinson APRN CNP [facility]        Today's Diagnoses     Dysuria    -  1       Follow-ups after your visit        Follow-up notes from your care team     Return if symptoms worsen or fail to improve.      Who to contact     If you have questions or need follow up information about today's clinic visit or your schedule please contact [facility] directly at [phone].  Normal or non-critical lab and imaging results will be communicated to you by MyChart, letter or phone within 4 business days after the clinic has received the results. If you do not hear from us within 7 days, please contact the clinic through [employer] or phone. If you have a critical or abnormal lab result, we will notify you by phone as soon as possible.  Submit refill requests through [employer] or call your pharmacy and they will forward the refill request to us. Please allow 3 business days for your refill to be completed.          Additional Information About Your Visit        MyChart Information     [employer] gives you secure access to your electronic health record. If you see a primary care provider, you can also send messages to your care team and make appointments. If you have questions, please call your primary care clinic.  If you do not have a primary care provider, please call [phone] and they will assist you.        Care EveryWhere ID     This is your Care EveryWhere ID. This could be used by other organizations to access your [facility] medical records  [id number]        Your Vitals Were     Last Period BMI (Body Mass Index)                09/01/2017 (Approximate) 26.26 kg/m2           Blood Pressure from Last 3 Encounters:   09/20/17 108/60    08/08/17 108/70   07/14/17 94/70    Weight from Last 3 Encounters:   09/20/17 153 lb (69.4 kg)   08/08/17 152 lb (68.9 kg)   07/14/17 151 lb 12.8 oz (68.9 kg)              We Performed the Following     UA with Microscopic     Urine Culture Aerobic Bacterial          Today's Medication Changes          These changes are accurate as of: 9/20/17 11:09 AM.  If you have any questions, ask your nurse or doctor.               Start taking these medicines.        Dose/Directions    nitroFURantoin (macrocrystal-monohydrate) 100 MG capsule   Commonly known as:  MACROBID   Used for:  Dysuria   Started by:  Shayna Atkinson APRN CNP        Dose:  100 mg   Take 1 capsule (100 mg) by mouth 2 times daily   Quantity:  10 capsule   Refills:  0            Where to get your medicines      These medications were sent to [facility] - [place], [place] - [address]  [address] [facility] [address] [address]     Phone:  [phone]     nitroFURantoin (macrocrystal-monohydrate) 100 MG capsule                Primary Care Provider Office Phone # Fax #    Eileen Barragan [phone] [address]  [address]  [facility] [address]        Equal Access to Services     BRENNA HEART : Hadii mert ku hadasho Soomaali, waaxda luqadaha, qaybta kaalmada adeegyada, waxay idiin haygabrielan sidney bautista. So [facility] [phone].    ATENCIÓN: Si habla español, tiene a linton disposición servicios gratuitos de asistencia lingüística. Llame al [phone].    We comply with applicable federal civil rights laws and [place] laws. We do not discriminate on the basis of race, color, national origin, age, disability sex, sexual orientation or gender identity.            Thank you!     Thank you for choosing [facility]  for your care. Our goal is always to provide you with excellent care. Hearing back from our patients is one way we can continue to improve our services. Please take a few minutes to  complete the written survey that you may receive in the mail after your visit with us. Thank you!             Your Updated Medication List - Protect others around you: Learn how to safely use, store and throw away your medicines at www.disposemymeds.org.          This list is accurate as of: 9/20/17 11:09 AM.  Always use your most recent med list.                   Brand Name Dispense Instructions for use Diagnosis    FISH OIL PO       Fatigue, unspecified type       levonorgestrel 20 MCG/24HR IUD    MIRENA (52 MG)    1 each    1 each (20 mcg) by Intrauterine route once for 1 dose    Encounter for IUD insertion       multivitamin per tablet     100    ONE DAILY        nitroFURantoin (macrocrystal-monohydrate) 100 MG capsule    MACROBID    10 capsule    Take 1 capsule (100 mg) by mouth 2 times daily    Dysuria

## 2017-09-21 LAB
BACTERIA SPEC CULT: NO GROWTH
Lab: NORMAL
SPECIMEN SOURCE: NORMAL

## 2017-09-28 ENCOUNTER — ALLIED HEALTH/NURSE VISIT (OUTPATIENT)
Dept: NURSING | Facility: CLINIC | Age: 37
End: 2017-09-28
Payer: COMMERCIAL

## 2017-09-28 DIAGNOSIS — Z23 NEED FOR PROPHYLACTIC VACCINATION AND INOCULATION AGAINST INFLUENZA: Primary | ICD-10-CM

## 2017-09-28 PROCEDURE — 90471 IMMUNIZATION ADMIN: CPT

## 2017-09-28 PROCEDURE — 90686 IIV4 VACC NO PRSV 0.5 ML IM: CPT

## 2017-09-28 PROCEDURE — 99207 ZZC NO CHARGE NURSE ONLY: CPT

## 2017-09-28 NOTE — MR AVS SNAPSHOT
After Visit Summary   9/28/2017    Janay Tam    MRN: [id number]           Patient Information     Date Of Birth          1980        Visit Information        Provider Department      9/28/2017 10:00 AM UP [facility]LES NURSE [facility]        Today's Diagnoses     Need for prophylactic vaccination and inoculation against influenza    -  1       Follow-ups after your visit        Who to contact     If you have questions or need follow up information about today's clinic visit or your schedule please contact [facility] directly at [phone].  Normal or non-critical lab and imaging results will be communicated to you by [employer], letter or phone within 4 business days after the clinic has received the results. If you do not hear from us within 7 days, please contact the clinic through Hub[employer] or phone. If you have a critical or abnormal lab result, we will notify you by phone as soon as possible.  Submit refill requests through [employer] or call your pharmacy and they will forward the refill request to us. Please allow 3 business days for your refill to be completed.          Additional Information About Your Visit        MyChart Information     [employer] gives you secure access to your electronic health record. If you see a primary care provider, you can also send messages to your care team and make appointments. If you have questions, please call your primary care clinic.  If you do not have a primary care provider, please call [phone] and they will assist you.        Care EveryWhere ID     This is your Care EveryWhere ID. This could be used by other organizations to access your [facility] medical records  [id number]        Your Vitals Were     Last Period                   09/01/2017 (Approximate)            Blood Pressure from Last 3 Encounters:   09/20/17 108/60   08/08/17 108/70   07/14/17 94/70    Weight from Last 3 Encounters:   09/20/17 153 lb (69.4 kg)   08/08/17  152 lb (68.9 kg)   07/14/17 151 lb 12.8 oz (68.9 kg)              We Performed the Following     FLU VAC, SPLIT VIRUS IM > 3 YO (QUADRIVALENT) [73103]     Vaccine Administration, Initial [33487]        Primary Care Provider Office Phone # Fax #    Eileen Barragan [phone] [address] [address]        Equal Access to Services     [facility] : Hadii aad ku hadasho Soomaali, waaxda luqadaha, qaybta kaalmada adeegyada, waxay idiin hayaan adeeg kharamarybel lanaya . So [facility] [phone].    ATENCIÓN: Si peter tadeo, tiene a linton disposición servicios gratuitos de asistencia lingüística. Llame al [phone].    We comply with applicable federal civil rights laws and [place] laws. We do not discriminate on the basis of race, color, national origin, age, disability sex, sexual orientation or gender identity.            Thank you!     Thank you for choosing [facility] NURSE  for your care. Our goal is always to provide you with excellent care. Hearing back from our patients is one way we can continue to improve our services. Please take a few minutes to complete the written survey that you may receive in the mail after your visit with us. Thank you!             Your Updated Medication List - Protect others around you: Learn how to safely use, store and throw away your medicines at www.disposemymeds.org.          This list is accurate as of: 9/28/17 10:04 AM.  Always use your most recent med list.                   Brand Name Dispense Instructions for use Diagnosis    FISH OIL PO       Fatigue, unspecified type       levonorgestrel 20 MCG/24HR IUD    MIRENA (52 MG)    1 each    1 each (20 mcg) by Intrauterine route once for 1 dose    Encounter for IUD insertion       multivitamin per tablet     100    ONE DAILY        nitroFURantoin (macrocrystal-monohydrate) 100 MG capsule    MACROBID    10 capsule    Take 1 capsule (100 mg) by mouth 2 times daily    Dysuria

## 2017-09-28 NOTE — PROGRESS NOTES
Injectable Influenza Immunization Documentation    1.  Is the person to be vaccinated sick today?   No    2. Does the person to be vaccinated have an allergy to a component   of the vaccine?   No    3. Has the person to be vaccinated ever had a serious reaction   to influenza vaccine in the past?   No    4. Has the person to be vaccinated ever had Guillain-Barré syndrome?   No    Form completed by veronica sanchez

## 2018-05-08 ENCOUNTER — TELEPHONE (OUTPATIENT)
Dept: FAMILY MEDICINE | Facility: CLINIC | Age: 38
End: 2018-05-08

## 2018-05-08 NOTE — TELEPHONE ENCOUNTER
Reason for call:  Patient reporting a symptom    Symptom or request: burning stomach pain/ diarrhea    Duration (how long have symptoms been present): 2 weeks    Have you been treated for this before? No    Additional comments: concerned it might be ulcer    Phone Number patient can be reached at:  Cell number on file:    Telephone Information:   Mobile [phone]       Best Time:  anytime    Can we leave a detailed message on this number:  YES    Call taken on 5/8/2018 at 1:56 PM by Osmel Prieto

## 2018-05-08 NOTE — TELEPHONE ENCOUNTER
Patient states she's been having some stomach pain for about 2 weeks  Mid afternoon and after dinner is when pain occurs  States upper stomach burns at times too  States doesn't feel like heartburn though  Really painful after ingestion of coffee, fatty foods, or avocado  Having diarrhea also   Diarrhea happens every other day  One day has solid stools  Next day has about 5 stools where one may be solid and the others diarrhea  Denies blood in stool   Denies N&V  Pain right below ribs to center of [facility]  Advised appt for assessment, labs, etc    Next 5 appointments (look out 90 days)     May 09, 2018 10:00 AM CDT   Office Visit with Richelle Fernandez MD   [facility] ([facility])    [address] [facility] [address]   [phone]            Jun 01, 2018  9:30 AM CDT   PHYSICAL with Sanjeev Pozo MD   [facility]    [address] [place] [id number]   [phone]                Shila TAMAYO RN

## 2018-05-09 ENCOUNTER — OFFICE VISIT (OUTPATIENT)
Dept: FAMILY MEDICINE | Facility: CLINIC | Age: 38
End: 2018-05-09
Payer: COMMERCIAL

## 2018-05-09 VITALS
WEIGHT: 146.1 LBS | TEMPERATURE: 98.5 F | SYSTOLIC BLOOD PRESSURE: 104 MMHG | DIASTOLIC BLOOD PRESSURE: 60 MMHG | HEART RATE: 76 BPM | BODY MASS INDEX: 24.94 KG/M2 | HEIGHT: 64 IN

## 2018-05-09 DIAGNOSIS — F41.9 ANXIETY: ICD-10-CM

## 2018-05-09 DIAGNOSIS — F43.9 SITUATIONAL STRESS: ICD-10-CM

## 2018-05-09 DIAGNOSIS — K21.9 GASTROESOPHAGEAL REFLUX DISEASE WITHOUT ESOPHAGITIS: Primary | ICD-10-CM

## 2018-05-09 PROCEDURE — 99214 OFFICE O/P EST MOD 30 MIN: CPT | Performed by: FAMILY MEDICINE

## 2018-05-09 NOTE — MR AVS SNAPSHOT
After Visit Summary   5/9/2018    Janay Tam    MRN: [id number]           Patient Information     Date Of Birth          1980        Visit Information        Provider Department      5/9/2018 10:00 AM Richelle Fernandez MD [facility]        Today's Diagnoses     Gastroesophageal reflux disease without esophagitis    -  1    Anxiety        Situational stress          Care Instructions      GERD (Adult)    The esophagus is a tube that carries food from the mouth to the stomach. A valve at the lower end of the esophagus prevents stomach acid from flowing upward. When this valve doesn't work properly, stomach contents may repeatedly flow back up (reflux) into the esophagus. This is called gastroesophageal reflux disease (GERD). GERD can irritate the esophagus. It can cause problems with swallowing or breathing. In severe cases, GERD can cause recurrent pneumonia or other serious problems.  Symptoms of reflux include burning, pressure or sharp pain in the upper abdomen or mid to lower chest. The pain can spread to the neck, back, or shoulder. There may be belching, an acid taste in the back of the throat, chronic cough, or sore throat or hoarseness. GERD symptoms often occur during the day after a big meal. They can also occur at night when lying down.   Home care  Lifestyle changes can help reduce symptoms. If needed, medicines may be prescribed. Symptoms often improve with treatment, but if treatment is stopped, the symptoms often return after a few months. So most persons with GERD will need to continue treatment.  Lifestyle changes    Limit or avoid fatty, fried, and spicy foods, as well as coffee, chocolate, mint, and foods with high acid content such as tomatoes and citrus fruit and juices (orange, grapefruit, lemon).    Don t eat large meals, especially at night. Frequent, smaller meals are best. Do not lie down right after eating. And don t eat anything 3 hours before  "going to bed.    Avoid drinking alcohol and smoking. As much as possible, stay away from second hand smoke.    If you are overweight, losing weight will reduce symptoms.     Avoid wearing tight clothing around your stomach area.    If your symptoms occur during sleep, use a foam wedge to elevate your upper body (not just your head.) Or, place 4\" blocks under the head of your bed.  Medicines  If needed, medicines can help relieve the symptoms of GERD and prevent damage to the esophagus. Discuss a medicine plan with your healthcare provider. This may include one or more of the following medicines:    Antacids to help neutralize the normal acids in your stomach.    Acid blockers (H2 blockers) to decrease acid production.    Acid inhibitors (PPIs) to decrease acid production in a different way than the blockers. They may work better, but can take a little longer to take effect.  Take an antacid 30-60 minutes after eating and at bedtime, but not at the same time as an acid blocker.  Try not to take medicines such as ibuprofen and aspirin. If you are taking aspirin for your heart or other medical reasons, talk to your healthcare provider about stopping it.  Follow-up care  Follow up with your healthcare provider or as advised by our staff.  When to seek medical advice  Call your healthcare provider if any of the following occur:    Stomach pain gets worse or moves to the lower right abdomen (appendix area)    Chest pain appears or gets worse, or spreads to the back, neck, shoulder, or arm    Frequent vomiting (can t keep down liquids)    Blood in the stool or vomit (red or black in color)    Feeling weak or dizzy    Fever of 100.4 F (38 C) or higher, or as directed by your healthcare provider  Date Last Reviewed: 6/23/2015 2000-2017 [employer]. [address] [place] [address]. All rights reserved. This information is not intended as a substitute for professional medical care. Always follow your " healthcare professional's instructions.        Lifestyle Changes for Controlling GERD  When you have GERD, stomach acid feels as if it s backing up toward your mouth. Whether or not you take medicine to control your GERD, your symptoms can often be improved with lifestyle changes. Talk to your healthcare provider about the following suggestions. These suggestions may help you get relief from your symptoms.      Raise your head  Reflux is more likely to strike when you re lying down flat, because stomach fluid can flow backward more easily. Raising the head of your bed 4 to 6 inches can help. To do this:    Slide blocks or books under the legs at the head of your bed. Or, place a wedge under the mattress. Many foam Lalina can make a suitable wedge for you. The wedge should run from your waist to the top of your head.    Don t just prop your head on several pillows. This increases pressure on your stomach. It can make GERD worse.  Watch your eating habits  Certain foods may increase the acid in your stomach or relax the lower esophageal sphincter. This makes GERD more likely. It s best to avoid the following if they cause you symptoms:    Coffee, tea, and carbonated drinks (with and without caffeine)    Fatty, fried, or spicy food    Mint, chocolate, onions, and tomatoes    Peppermint    Any other foods that seem to irritate your stomach or cause you pain  Relieve the pressure  Tips include the following:    Eat smaller meals, even if you have to eat more often.    Don t lie down right after you eat. Wait a few hours for your stomach to empty.    Avoid tight belts and tight-fitting clothes.    Lose excess weight.  Tobacco and alcohol  Avoid smoking tobacco and drinking alcohol. They can make GERD symptoms worse.  Date Last Reviewed: 7/1/2016 2000-2017 [employer]. [address] [place] [address], [place] [address]. All rights reserved. This information is not intended as a substitute for professional medical  "care. Always follow your healthcare professional's instructions.                Follow-ups after your visit        Your next 10 appointments already scheduled     Jun 01, 2018  9:30 AM CDT   PHYSICAL with Sanjeev Pozo MD   [facility] ([facility])    [address] [place] [id number] [phone]              Who to contact     If you have questions or need follow up information about today's clinic visit or your schedule please contact [facility] directly at [phone].  Normal or non-critical lab and imaging results will be communicated to you by [employer], letter or phone within 4 business days after the clinic has received the results. If you do not hear from us within 7 days, please contact the clinic through [employer] or phone. If you have a critical or abnormal lab result, we will notify you by phone as soon as possible.  Submit refill requests through [employer] or call your pharmacy and they will forward the refill request to us. Please allow 3 business days for your refill to be completed.          Additional Information About Your Visit        MyChart Information     [employer] gives you secure access to your electronic health record. If you see a primary care provider, you can also send messages to your care team and make appointments. If you have questions, please call your primary care clinic.  If you do not have a primary care provider, please call [phone] and they will assist you.        Care EveryWhere ID     This is your Care EveryWhere ID. This could be used by other organizations to access your [facility] medical records  [id number]        Your Vitals Were     Pulse Temperature Height BMI (Body Mass Index)          76 98.5  F (36.9  C) (Tympanic) 5' 4\" (1.626 m) 25.08 kg/m2         Blood Pressure from Last 3 Encounters:   05/09/18 104/60   09/20/17 108/60   08/08/17 108/70    Weight from Last 3 Encounters: "   05/09/18 146 lb 1.6 oz (66.3 kg)   09/20/17 153 lb (69.4 kg)   08/08/17 152 lb (68.9 kg)              Today, you had the following     No orders found for display         Today's Medication Changes          These changes are accurate as of 5/9/18 10:32 AM.  If you have any questions, ask your nurse or doctor.               Start taking these medicines.        Dose/Directions    ranitidine 150 MG tablet   Commonly known as:  ZANTAC   Used for:  Gastroesophageal reflux disease without esophagitis   Started by:  Richelle Fernandez MD        Dose:  150 mg   Take 1 tablet (150 mg) by mouth 2 times daily   Quantity:  60 tablet   Refills:  1            Where to get your medicines      These medications were sent to [facility] - [place], [place] - [address] [facility]  [address] [facility] [address]     Phone:  [phone]     ranitidine 150 MG tablet                Primary Care Provider Office Phone # Fax #    Eileen LINDA Barragan [phone] [address] [address]        Equal Access to Services     [facility]: Hadii mert rizzo hadasho Sokarin, waaxda luqadaha, qaybta kaalmada adehossein, lionel ren . So [facility] [phone].    ATENCIÓN: Si habla español, tiene a linton disposición servicios gratuitos de asistencia lingüística. Llame al [phone].    We comply with applicable federal civil rights laws and [place] laws. We do not discriminate on the basis of race, color, national origin, age, disability, sex, sexual orientation, or gender identity.            Thank you!     Thank you for choosing [facility]  for your care. Our goal is always to provide you with excellent care. Hearing back from our patients is one way we can continue to improve our services. Please take a few minutes to complete the written survey that you may receive in the mail after your visit with us. Thank you!             Your Updated Medication List -  Protect others around you: Learn how to safely use, store and throw away your medicines at www.disposemymeds.org.          This list is accurate as of 5/9/18 10:32 AM.  Always use your most recent med list.                   Brand Name Dispense Instructions for use Diagnosis    FISH OIL PO       Fatigue, unspecified type       levonorgestrel 20 MCG/24HR IUD    MIRENA (52 MG)    1 each    1 each (20 mcg) by Intrauterine route once for 1 dose    Encounter for IUD insertion       multivitamin per tablet     100    ONE DAILY        ranitidine 150 MG tablet    ZANTAC    60 tablet    Take 1 tablet (150 mg) by mouth 2 times daily    Gastroesophageal reflux disease without esophagitis

## 2018-05-09 NOTE — PROGRESS NOTES
SUBJECTIVE:   Janay Tam is a 37 year old female who presents to clinic today for the following health issues:      Abdominal Pain      Duration: upper abd pain on and off, more so in past 2 weeks, also associated with being stressed and more  worrisome in past  Month    - middle son with more medical needs triggered the stomach symptoms and worsened the anxiety    Worried if has stomach ulcer    Often times fatty food bring it on- burning pain in middle / upper abdomen     Was eating healthy before- for weight loss challenge- but that ended in march and since then- diet not as healthy    History of anxiety/ has an established therapist, sees on and off , last visit  2 weeks ago but nothing  consistently    Description (location/character/radiation): Below ribs to center of abdomen, upper stomach  On and off        Associated flank pain: None    Intensity:  moderate    Accompanying signs and symptoms:        Fever/Chills: no        Gas/Bloating: YES       Nausea/vomitting: no        Diarrhea: YES,soft stools on and off- yesterday were 4, today ok- usually goes soft stool well formed twice daily        Dysuria or Hematuria: no     History (previous similar pain/trauma/previous testing): none    Precipitating or alleviating factors:       Pain worse with eating/BM/urination: worse after caffeine       Pain relieved by BM: YES    Therapies tried and outcome: TUMS with improvement    LMP:  IUD,       PROBLEMS TO ADD ON...    Problem list and histories reviewed & adjusted, as indicated.  Additional history: as documented    Patient Active Problem List   Diagnosis     Allergic rhinitis     CARDIOVASCULAR SCREENING; LDL GOAL LESS THAN 160     Anxiety     Gastroesophageal reflux disease without esophagitis     Past Surgical History:   Procedure Laterality Date     C IUD,MIRENA  12/2010     NO HISTORY OF SURGERY         Social History   Substance Use Topics     Smoking status: Never Smoker     Smokeless tobacco:  "Never Used     Alcohol use No     Family History   Problem Relation Age of Onset     C.A.D. Father      heart problems     CEREBROVASCULAR DISEASE Father      C.A.D. Maternal Grandfather      heart problems     DIABETES Maternal Grandfather      Hypertension Mother      Depression Mother      Psychotic Disorder Mother      Hyperlipidemia Mother      Thyroid Disease Mother      CEREBROVASCULAR DISEASE Paternal Grandmother      OSTEOPOROSIS Paternal Grandmother      Arthritis Paternal Grandmother      Neurologic Disorder Sister      mental health     Neurologic Disorder Brother      mental health     Thyroid Disease Sister            Reviewed and updated as needed this visit by clinical staff       Reviewed and updated as needed this visit by Provider         ROS:  Constitutional, HEENT, cardiovascular, pulmonary, gi and gu systems are negative, except as otherwise noted.    OBJECTIVE:     /60  Pulse 76  Temp 98.5  F (36.9  C) (Tympanic)  Ht 5' 4\" (1.626 m)  Wt 146 lb 1.6 oz (66.3 kg)  BMI 25.08 kg/m2  Body mass index is 25.08 kg/(m^2).  GENERAL: healthy, alert and no distress  NECK: no adenopathy, no asymmetry, masses, or scars and thyroid normal to palpation  RESP: lungs clear to auscultation - no rales, rhonchi or wheezes  CV: regular rate and rhythm, normal S1 S2, no S3 or S4, no murmur, click or rub, no peripheral edema and peripheral pulses strong  ABDOMEN: soft, nontender, no hepatosplenomegaly, no masses and bowel sounds normal  MS: no gross musculoskeletal defects noted, no edema    Diagnostic Test Results:  none     ASSESSMENT/PLAN:     1. Gastroesophageal reflux disease without esophagitis  -long discussion about maintaining food diary to avoid foods causing the symptoms  -avoid fatty foods, avoid late meals or lying down too quickly after meals  -ok to take tums as needed - that help  - ranitidine (ZANTAC) 150 MG tablet; Take 1 tablet (150 mg) by mouth 2 times daily  Dispense: 60 tablet; Refill: " 1, for 1-2 months and than ok to tae only as needed    -follow up with provider for unresolved or worsening symptoms   -Please see patient instructions     Diarrhea & stools better  However if persistent concern ? If having irritable bowel syndrome as well  2. Anxiety    Encouraged to add physical activity of choice  encouraged to increase weekly therapy - has an established therapist   & follow up with primary care physicain , if more concerns  Did try effexor 10-12 yrs ago- Does not intended to start medications    3. Situational stress  As above                   Richelle Fernandez MD  [facility]

## 2018-05-09 NOTE — PATIENT INSTRUCTIONS
"  GERD (Adult)    The esophagus is a tube that carries food from the mouth to the stomach. A valve at the lower end of the esophagus prevents stomach acid from flowing upward. When this valve doesn't work properly, stomach contents may repeatedly flow back up (reflux) into the esophagus. This is called gastroesophageal reflux disease (GERD). GERD can irritate the esophagus. It can cause problems with swallowing or breathing. In severe cases, GERD can cause recurrent pneumonia or other serious problems.  Symptoms of reflux include burning, pressure or sharp pain in the upper abdomen or mid to lower chest. The pain can spread to the neck, back, or shoulder. There may be belching, an acid taste in the back of the throat, chronic cough, or sore throat or hoarseness. GERD symptoms often occur during the day after a big meal. They can also occur at night when lying down.   Home care  Lifestyle changes can help reduce symptoms. If needed, medicines may be prescribed. Symptoms often improve with treatment, but if treatment is stopped, the symptoms often return after a few months. So most persons with GERD will need to continue treatment.  Lifestyle changes    Limit or avoid fatty, fried, and spicy foods, as well as coffee, chocolate, mint, and foods with high acid content such as tomatoes and citrus fruit and juices (orange, grapefruit, lemon).    Don t eat large meals, especially at night. Frequent, smaller meals are best. Do not lie down right after eating. And don t eat anything 3 hours before going to bed.    Avoid drinking alcohol and smoking. As much as possible, stay away from second hand smoke.    If you are overweight, losing weight will reduce symptoms.     Avoid wearing tight clothing around your stomach area.    If your symptoms occur during sleep, use a foam wedge to elevate your upper body (not just your head.) Or, place 4\" blocks under the head of your bed.  Medicines  If needed, medicines can help relieve " the symptoms of GERD and prevent damage to the esophagus. Discuss a medicine plan with your healthcare provider. This may include one or more of the following medicines:    Antacids to help neutralize the normal acids in your stomach.    Acid blockers (H2 blockers) to decrease acid production.    Acid inhibitors (PPIs) to decrease acid production in a different way than the blockers. They may work better, but can take a little longer to take effect.  Take an antacid 30-60 minutes after eating and at bedtime, but not at the same time as an acid blocker.  Try not to take medicines such as ibuprofen and aspirin. If you are taking aspirin for your heart or other medical reasons, talk to your healthcare provider about stopping it.  Follow-up care  Follow up with your healthcare provider or as advised by our staff.  When to seek medical advice  Call your healthcare provider if any of the following occur:    Stomach pain gets worse or moves to the lower right abdomen (appendix area)    Chest pain appears or gets worse, or spreads to the back, neck, shoulder, or arm    Frequent vomiting (can t keep down liquids)    Blood in the stool or vomit (red or black in color)    Feeling weak or dizzy    Fever of 100.4 F (38 C) or higher, or as directed by your healthcare provider  Date Last Reviewed: 6/23/2015 2000-2017 [employer]. [address] [place] [address], [place] [address]. All rights reserved. This information is not intended as a substitute for professional medical care. Always follow your healthcare professional's instructions.        Lifestyle Changes for Controlling GERD  When you have GERD, stomach acid feels as if it s backing up toward your mouth. Whether or not you take medicine to control your GERD, your symptoms can often be improved with lifestyle changes. Talk to your healthcare provider about the following suggestions. These suggestions may help you get relief from your symptoms.      Raise your  head  Reflux is more likely to strike when you re lying down flat, because stomach fluid can flow backward more easily. Raising the head of your bed 4 to 6 inches can help. To do this:    Slide blocks or books under the legs at the head of your bed. Or, place a wedge under the mattress. Many foam stores can make a suitable wedge for you. The wedge should run from your waist to the top of your head.    Don t just prop your head on several pillows. This increases pressure on your stomach. It can make GERD worse.  Watch your eating habits  Certain foods may increase the acid in your stomach or relax the lower esophageal sphincter. This makes GERD more likely. It s best to avoid the following if they cause you symptoms:    Coffee, tea, and carbonated drinks (with and without caffeine)    Fatty, fried, or spicy food    Mint, chocolate, onions, and tomatoes    Peppermint    Any other foods that seem to irritate your stomach or cause you pain  Relieve the pressure  Tips include the following:    Eat smaller meals, even if you have to eat more often.    Don t lie down right after you eat. Wait a few hours for your stomach to empty.    Avoid tight belts and tight-fitting clothes.    Lose excess weight.  Tobacco and alcohol  Avoid smoking tobacco and drinking alcohol. They can make GERD symptoms worse.  Date Last Reviewed: 7/1/2016 2000-2017 [employer]. [address] [place] [address], [place] [address]. All rights reserved. This information is not intended as a substitute for professional medical care. Always follow your healthcare professional's instructions.

## 2018-06-01 ENCOUNTER — OFFICE VISIT (OUTPATIENT)
Dept: OBGYN | Facility: CLINIC | Age: 38
End: 2018-06-01
Payer: COMMERCIAL

## 2018-06-01 VITALS
DIASTOLIC BLOOD PRESSURE: 66 MMHG | SYSTOLIC BLOOD PRESSURE: 102 MMHG | BODY MASS INDEX: 25.52 KG/M2 | HEART RATE: 80 BPM | HEIGHT: 63 IN | WEIGHT: 144 LBS

## 2018-06-01 DIAGNOSIS — Z30.432 ENCOUNTER FOR IUD REMOVAL: ICD-10-CM

## 2018-06-01 DIAGNOSIS — Z97.5 PRESENCE OF INTRAUTERINE CONTRACEPTIVE DEVICE: ICD-10-CM

## 2018-06-01 DIAGNOSIS — R68.82 DECREASED LIBIDO: ICD-10-CM

## 2018-06-01 DIAGNOSIS — Z01.419 ENCOUNTER FOR GYNECOLOGICAL EXAMINATION WITHOUT ABNORMAL FINDING: Primary | ICD-10-CM

## 2018-06-01 PROCEDURE — 99395 PREV VISIT EST AGE 18-39: CPT | Mod: 25 | Performed by: OBSTETRICS & GYNECOLOGY

## 2018-06-01 PROCEDURE — 99213 OFFICE O/P EST LOW 20 MIN: CPT | Mod: 25 | Performed by: OBSTETRICS & GYNECOLOGY

## 2018-06-01 PROCEDURE — 58301 REMOVE INTRAUTERINE DEVICE: CPT | Performed by: OBSTETRICS & GYNECOLOGY

## 2018-06-01 ASSESSMENT — ANXIETY QUESTIONNAIRES
GAD7 TOTAL SCORE: 5
2. NOT BEING ABLE TO STOP OR CONTROL WORRYING: NOT AT ALL
6. BECOMING EASILY ANNOYED OR IRRITABLE: SEVERAL DAYS
IF YOU CHECKED OFF ANY PROBLEMS ON THIS QUESTIONNAIRE, HOW DIFFICULT HAVE THESE PROBLEMS MADE IT FOR YOU TO DO YOUR WORK, TAKE CARE OF THINGS AT HOME, OR GET ALONG WITH OTHER PEOPLE: NOT DIFFICULT AT ALL
1. FEELING NERVOUS, ANXIOUS, OR ON EDGE: SEVERAL DAYS
5. BEING SO RESTLESS THAT IT IS HARD TO SIT STILL: NOT AT ALL
3. WORRYING TOO MUCH ABOUT DIFFERENT THINGS: SEVERAL DAYS
7. FEELING AFRAID AS IF SOMETHING AWFUL MIGHT HAPPEN: MORE THAN HALF THE DAYS

## 2018-06-01 ASSESSMENT — PATIENT HEALTH QUESTIONNAIRE - PHQ9: 5. POOR APPETITE OR OVEREATING: NOT AT ALL

## 2018-06-01 NOTE — MR AVS SNAPSHOT
After Visit Summary   6/1/2018    Janay Tam    MRN: [id number]           Patient Information     Date Of Birth          1980        Visit Information        Provider Department      6/1/2018 9:30 AM Sanjeev Pozo MD [facility]        Today's Diagnoses     Encounter for gynecological examination without abnormal finding    -  1       Follow-ups after your visit        Your next 10 appointments already scheduled     Jun 05, 2018  9:40 AM CDT   LAB with [facility]   [facility] ([facility])    [address] [place] [id number] [phone]           Please do not eat 10-12 hours before your appointment if you are coming in fasting for labs on lipids, cholesterol, or glucose (sugar). This does not apply to pregnant women. Water, hot tea and black coffee (with nothing added) are okay. Do not drink other fluids, diet soda or chew gum.              Who to contact     If you have questions or need follow up information about today's clinic visit or your schedule please contact [facility] directly at [phone].  Normal or non-critical lab and imaging results will be communicated to you by MyChart, letter or phone within 4 business days after the clinic has received the results. If you do not hear from us within 7 days, please contact the clinic through [employer] or phone. If you have a critical or abnormal lab result, we will notify you by phone as soon as possible.  Submit refill requests through [employer] or call your pharmacy and they will forward the refill request to us. Please allow 3 business days for your refill to be completed.          Additional Information About Your Visit        MyChart Information     [employer] gives you secure access to your electronic health record. If you see a primary care provider, you can also send messages to your care team and make appointments. If  "you have questions, please call your primary care clinic.  If you do not have a primary care provider, please call [phone] and they will assist you.        Care EveryWhere ID     This is your Care EveryWhere ID. This could be used by other organizations to access your [facility] medical records  [id number]        Your Vitals Were     Pulse Height Breastfeeding? BMI (Body Mass Index)          80 5' 3\" (1.6 m) No 25.51 kg/m2         Blood Pressure from Last 3 Encounters:   06/01/18 102/66   05/09/18 104/60   09/20/17 108/60    Weight from Last 3 Encounters:   06/01/18 144 lb (65.3 kg)   05/09/18 146 lb 1.6 oz (66.3 kg)   09/20/17 153 lb (69.4 kg)              Today, you had the following     No orders found for display       Primary Care Provider Office Phone # Fax #    Eileen LINDA Barragan [phone] [address] [address]        Equal Access to Services     [facility]: Hadii aad ku hadasho Soomaali, waaxda luqadaha, qaybta kaalmada adeegyada, waxbrea bhatti haymirna ren . So [facility] [phone].    ATENCIÓN: Si habla español, tiene a linton disposición servicios gratuitos de asistencia lingüística. Llame al [phone].    We comply with applicable federal civil rights laws and [place] laws. We do not discriminate on the basis of race, color, national origin, age, disability, sex, sexual orientation, or gender identity.            Thank you!     Thank you for choosing [facility]  for your care. Our goal is always to provide you with excellent care. Hearing back from our patients is one way we can continue to improve our services. Please take a few minutes to complete the written survey that you may receive in the mail after your visit with us. Thank you!             Your Updated Medication List - Protect others around you: Learn how to safely use, store and throw away your medicines at www.disposemymeds.org.          This list is " accurate as of 6/1/18 10:11 AM.  Always use your most recent med list.                   Brand Name Dispense Instructions for use Diagnosis    FISH OIL PO       Fatigue, unspecified type       levonorgestrel 20 MCG/24HR IUD    MIRENA (52 MG)    1 each    1 each (20 mcg) by Intrauterine route once for 1 dose    Encounter for IUD insertion       multivitamin per tablet     100    ONE DAILY        ranitidine 150 MG tablet    ZANTAC    60 tablet    Take 1 tablet (150 mg) by mouth 2 times daily    Gastroesophageal reflux disease without esophagitis

## 2018-06-01 NOTE — PROGRESS NOTES
Janay is a 37 year old  female who presents for annual exam.     Besides routine health maintenance,  she would like to discuss possible IUD removal.    HPI:  The patient's PCP is  Eileen Barragan DO.    Believes has decreased libido since IUD insertion. Has spotting about once a month. Really didn't like her normal cycles.   has VAS.  Needs Lipids.       GYNECOLOGIC HISTORY:    No LMP recorded. Patient is not currently having periods (Reason: IUD).  Her current contraception method is: IUD.  She  reports that she has never smoked. She has never used smokeless tobacco.    Patient is sexually active.  STD testing offered?  Declined  Last PHQ-9 score on record =   PHQ-9 SCORE 2018   Total Score -   Total Score 0     Last GAD7 score on record =   MILA-7 SCORE 2018   Total Score 5     Alcohol Score = 1    HEALTH MAINTENANCE:  Cholesterol:   Cholesterol   Date Value Ref Range Status   2005 148 0 - 200 mg/dL Final     Comment:     Cholesterol Reference Range:   <200  The NCEP recommends further         evaluation of:         1.  Patients with cholesterol             greater than 200 mg/dL             if additional risk factors             are present.         2.  All patients with a             cholesterol greater than             240 mg/dL.      Last Mammo: 2016, Result: normal, Next Mammo: 2 years.   Pap: 2017 Neg  Lab Results   Component Value Date    PAP NIL 2017    PAP NIL 2014    PAP NIL 2012      Colonoscopy:  NA, Result: not applicable, Next Colonoscopy: 12 years.  Dexa:  NA    Health maintenance updated:  yes    HISTORY:  Obstetric History       T3      L3     SAB0   TAB0   Ectopic0   Multiple0   Live Births3       # Outcome Date GA Lbr John/2nd Weight Sex Delivery Anes PTL Lv   3 Term 02/10/15 38w5d 01:15 / 03:05 8 lb 10 oz (3.912 kg) M Vag-Spont EPI  EDU      Apgar1:  8                Apgar5: 9   2 Term 10/09/10 39w0d  7 lb 14 oz (3.572 kg) M   EPI  EDU   1 Term 10/31/08 38w0d  7 lb 4 oz (3.289 kg) M    EDU          Patient Active Problem List   Diagnosis     Allergic rhinitis     CARDIOVASCULAR SCREENING; LDL GOAL LESS THAN 160     Anxiety     Gastroesophageal reflux disease without esophagitis     Past Surgical History:   Procedure Laterality Date     C IUD,MIRENA  2010     NO HISTORY OF SURGERY        Social History   Substance Use Topics     Smoking status: Never Smoker     Smokeless tobacco: Never Used     Alcohol use 0.0 oz/week     0 Standard drinks or equivalent per week      Comment: very rare      Problem (# of Occurrences) Relation (Name,Age of Onset)    Arthritis (1) Paternal Grandmother    C.A.D. (2) Father: heart problems, Maternal Grandfather: heart problems    CEREBROVASCULAR DISEASE (2) Father, Paternal Grandmother    DIABETES (1) Maternal Grandfather    Depression (1) Mother    Hyperlipidemia (1) Mother    Hypertension (1) Mother    Neurologic Disorder (2) Sister: mental health, Brother: mental health    OSTEOPOROSIS (1) Paternal Grandmother    Psychotic Disorder (1) Mother    Thyroid Disease (2) Mother, Sister            Current Outpatient Prescriptions   Medication Sig     levonorgestrel (MIRENA, 52 MG,) 20 MCG/24HR IUD 1 each (20 mcg) by Intrauterine route once for 1 dose     MULTIVITAMINS OR TABS ONE DAILY     Omega-3 Fatty Acids (FISH OIL PO)      ranitidine (ZANTAC) 150 MG tablet Take 1 tablet (150 mg) by mouth 2 times daily     No current facility-administered medications for this visit.      Facility-Administered Medications Ordered in Other Visits   Medication     lidocaine-EPINEPHrine 1.5 %-1:030352 injection     ROPivacaine (NAROPIN) injection     Allergies   Allergen Reactions     Penicillins        Past medical, surgical, social and family histories were reviewed and updated in EPIC.    ROS:   12 point review of systems negative other than symptoms noted below.  Gastrointestinal: Abdominal Pain and  "Heartburn    EXAM:  /66  Pulse 80  Ht 5' 3\" (1.6 m)  Wt 144 lb (65.3 kg)  Breastfeeding? No  BMI 25.51 kg/m2   BMI: Body mass index is 25.51 kg/(m^2).    PHYSICAL EXAM:  Constitutional:  Appearance: Well nourished, well developed, alert, in no acute distress  Neck:  Lymph Nodes:  No lymphadenopathy present    Thyroid:  Gland size normal, nontender, no nodules or masses present  on palpation  Chest:  Respiratory Effort:  Breathing unlabored  Cardiovascular:    Heart: Auscultation:  Regular rate, normal rhythm, no murmurs present  Breasts: Inspection of Breasts:  No lymphadenopathy present., Palpation of Breasts and Axillae:  No masses present on palpation, no breast tenderness., Axillary Lymph Nodes:  No lymphadenopathy present. and No nodularity, asymmetry or nipple discharge bilaterally.  Gastrointestinal:   Abdominal Examination:  Abdomen nontender to palpation, tone normal without rigidity or guarding, no masses present, umbilicus without lesions   Liver and Spleen:  No hepatomegaly present, liver nontender to palpation    Hernias:  No hernias present  Lymphatic: Lymph Nodes:  No other lymphadenopathy present  Skin:  General Inspection:  No rashes present, no lesions present, no areas of  discoloration    Genitalia and Groin:  No rashes present, no lesions present, no areas of  discoloration, no masses present  Neurologic/Psychiatric:    Mental Status:  Oriented X3     Pelvic Exam:  External Genitalia:     Normal appearance for age, no discharge present, no tenderness present, no inflammatory lesions present, color normal  Vagina:     Normal vaginal vault without central or paravaginal defects, no discharge present, no inflammatory lesions present, no masses present  Bladder:     Nontender to palpation  Urethra:   Urethral Body:  Urethra palpation normal, urethra structural support normal   Urethral Meatus:  No erythema or lesions present  Cervix:     Appearance healthy, no lesions present, nontender " to palpation, no bleeding present, IUD string present  Uterus:     Uterus: firm, normal sized and nontender, anteverted in position.   Adnexa:     No adnexal tenderness present, no adnexal masses present  Perineum:     Perineum within normal limits, no evidence of trauma, no rashes or skin lesions present  Anus:     Anus within normal limits, no hemorrhoids present  Inguinal Lymph Nodes:     No lymphadenopathy present  Pubic Hair:     Normal pubic hair distribution for age  Genitalia and Groin:     No rashes present, no lesions present, no areas of discoloration, no masses present      COUNSELING:   Reviewed preventive health counseling, as reflected in patient instructions       Regular exercise    BMI: Body mass index is 25.51 kg/(m^2).      ASSESSMENT:  37 year old female with satisfactory annual exam.    ICD-10-CM    1. Encounter for gynecological examination without abnormal finding Z01.419    2. Presence of intrauterine contraceptive device Z97.5    3. Decreased libido R68.82        PLAN:  Pt wishes to have IUD removed and see if libido improves. Discussed other life influences of libido. Declines OCPs for cycle suppression due to concerns about affect on libido.  If Cycles become an issue with IUD removed, discussed ablation.  Will have fasting labs with PCP since not fasting today.    Sanjeev Pozo MD    INDICATIONS:                                                      Is a pregnancy test required: No.  Was a consent obtained?  Verbally    Janay Tam is a 37 year old female,, No LMP recorded. Patient is not currently having periods (Reason: IUD). who presents today for IUD removal.   Today's PHQ-2 Score:   PHQ-2 (  [employer]) 2016   Q1: Little interest or pleasure in doing things 0   Q2: Feeling down, depressed or hopeless 0   PHQ-2 Score 0       PROCEDURE:                                                      A speculum exam was performed and the cervix was visualized. The IUD string  was visualized. Using ring forceps, the string  was grasped and the IUD removed intact.    POST PROCEDURE:                                                      The patient tolerated the procedure well. Patient was discharged in stable condition.    Will observe cycles    Sanjeev Pozo MD

## 2018-06-02 ASSESSMENT — PATIENT HEALTH QUESTIONNAIRE - PHQ9: SUM OF ALL RESPONSES TO PHQ QUESTIONS 1-9: 0

## 2018-06-02 ASSESSMENT — ANXIETY QUESTIONNAIRES: GAD7 TOTAL SCORE: 5

## 2018-09-04 ENCOUNTER — OFFICE VISIT (OUTPATIENT)
Dept: DERMATOLOGY | Facility: CLINIC | Age: 38
End: 2018-09-04
Payer: COMMERCIAL

## 2018-09-04 VITALS — SYSTOLIC BLOOD PRESSURE: 105 MMHG | DIASTOLIC BLOOD PRESSURE: 73 MMHG | HEART RATE: 68 BPM | OXYGEN SATURATION: 100 %

## 2018-09-04 DIAGNOSIS — L82.1 SEBORRHEIC KERATOSIS: ICD-10-CM

## 2018-09-04 DIAGNOSIS — D22.9 NEVUS: ICD-10-CM

## 2018-09-04 DIAGNOSIS — L81.4 LENTIGO: ICD-10-CM

## 2018-09-04 DIAGNOSIS — B07.8 OTHER VIRAL WARTS: ICD-10-CM

## 2018-09-04 DIAGNOSIS — D18.00 ANGIOMA: Primary | ICD-10-CM

## 2018-09-04 PROCEDURE — 17110 DESTRUCTION B9 LES UP TO 14: CPT | Performed by: PHYSICIAN ASSISTANT

## 2018-09-04 PROCEDURE — 99204 OFFICE O/P NEW MOD 45 MIN: CPT | Mod: 25 | Performed by: PHYSICIAN ASSISTANT

## 2018-09-04 NOTE — PROGRESS NOTES
HPI:   Janay Tam is a 38 year old female who presents for Full skin cancer screening.  chief complaint  Last Skin Exam: none      1st Baseline: YES  Personal HX of Skin Cancer: none   Personal HX of Malignant Melanoma: none   Family HX of Skin Cancer / Malignant Melanoma: sister, not sure which type   Personal HX of Atypical Moles:   none  Risk factors: sun exposure, blistering sunburn in her youth  New / Changing lesions: yes, on both legs. Also, wart on right wrist.  Social History: has 3 sons ages 9, 7 and 3. Middle son is deaf.   On review of systems, there are no further skin complaints, patient is feeling otherwise well.  See patient intake sheet.  ROS of the following were done and are negative: Constitutional, Eyes, Ears, Nose,   Mouth, Throat, Cardiovascular, Respiratory, GI, Genitourinary, Musculoskeletal,   Psychiatric, Endocrine, Allergic/Immunologic.    This document serves as a record of the services and decisions personally performed and made by Sylvie Hauser, MS, PA-C. It was created on her behalf by Gogo Richter, a trained medical scribe. The creation of this document is based on the provider's statements to the medical scribe.  Gogo Richter 11:28 AM September 4, 2018    PHYSICAL EXAM:   /73  Pulse 68  LMP 08/17/2018  SpO2 100%  Breastfeeding? No  Skin exam performed as follows: Type 2 skin. Mood appropriate  Alert and Oriented X 3. Well developed, well nourished in no distress.  General appearance: Normal  Head including face: Normal  Eyes: conjunctiva and lids: Normal  Mouth: Lips, teeth, gums: Normal  Neck: Normal  Chest-breast/axillae: Normal  Back: Normal  Spleen and liver: Normal  Cardiovascular: Exam of peripheral vascular system by observation for swelling, varicosities, edema: Normal  Genitalia: groin, buttocks: Normal  Extremities: digits/nails (clubbing): Normal  Eccrine and Apocrine glands: Normal  Right upper extremity: Normal  Left upper  extremity: Normal  Right lower extremity: Normal  Left lower extremity: Normal  Skin: Scalp and body hair: See below    Pt deferred exam of breasts, groin, buttocks: No    Other physical findings:  1. Multiple pigmented macules on extremities and trunk  2. Multiple pigmented macules on face, trunk and extremities  3. Multiple vascular papules on trunk, arms and legs  4. Multiple scattered keratotic plaques  6. Verrucous papule on right wrist x 1       Except as noted above, no other signs of skin cancer or melanoma.     ASSESSMENT/PLAN:   Benign Full skin cancer screening today.      Patient with history of none  Advised on monthly self exams and 1 year  Patient Education: Appropriate brochures given.    Multiple benign appearing nevi on arms, legs and trunk. Discussed ABCDEs of melanoma and sunscreen.   Multiple lentigos on arms, legs and trunk. Advised benign, no treatment needed.  Multiple scattered angiomas. Advised benign, no treatment needed.   Seborrheic keratosis on arms, legs and trunk. Advised benign, no treatment needed.  Wart on the right wrist x 1. Irritated per patient so cryosurgery performed. Advised on blistering and post op care.   Varicose veins on bilateral legs which are painful. Will refer to veins solutions.      Follow-up: yearly FSE/PRN sooner    1.) Patient was asked about new and changing moles. YES  2.) Patient received a complete physical skin examination: YES  3.) Patient was counseled to perform a monthly self skin examination: YES  Scribed By: Gogo Richter, Medical Scribe    The information in this document, created by the medical scribe for me, accurately reflects the services I personally performed and the decisions made by me. I have reviewed and approved this document for accuracy prior to leaving the patient care area.  September 4, 2018 11:37 AM    Sylvie Hauser MS, PAGracyC

## 2018-09-04 NOTE — MR AVS SNAPSHOT
After Visit Summary   9/4/2018    Janay Tam    MRN: [id number]           Patient Information     Date Of Birth          1980        Visit Information        Provider Department      9/4/2018 11:15 AM Sylvie Hauser PA-C [facility]        Today's Diagnoses     Angioma    -  1    Lentigo        Seborrheic keratosis        Nevus          Care Instructions    WOUND CARE INSTRUCTIONS   FOR CRYOSURGERY   This area treated with liquid nitrogen should form a blister (areas treated may or may not blister-skin may just turn dark and slough off). You do not need to bandage the area unless a blister forms and breaks (which may be a few days). When the blister breaks, begin daily dressing changes as follows:  1) Clean and dry the area with tap water using clean Q-tip or sterile gauze pad.   2) Apply Polysporin ointment or Bacitracin ointment over entire wound. Do NOT use Neosporin ointment.   3) Cover the wound with a band-aid or sterile non-stick gauze pad and micropore paper tape.   REPEAT THESE INSTRUCTIONS AT LEAST ONCE A DAY UNTIL THE WOUND HAS COMPLETELY HEALED.   It is an old wives tale that a wound heals better when it is exposed to air and allowed to dry out. The wound will heal faster with a better cosmetic result if it is kept moist with ointment and covered with a bandage.   Do not let the wound dry out.   IMPORTANT INFORMATION ON REVERSE SIDE   Supplies Needed:   *Cotton tipped applicators (Q-tips)   *Polysporin ointment or Bacitracin ointment (NOT NEOSPORIN)   *Band-aids, or non stick gauze pads and micropore paper tape   PATIENT INFORMATION   During the healing process you will notice a number of changes. All wounds develop a small halo of redness surrounding the wound. This means healing is occurring. Severe itching with extensive redness usually indicates sensitivity to the ointment or bandage tape used to dress the wound. You should call our office if  this develops.   Swelling and/or discoloration around your surgical site is common, particularly when performed around the eye.   All wounds normally drain. The larger the wound the more drainage there will be. After 7-10 days, you will notice the wound beginning to shrink and new skin will begin to grow. The wound is healed when you can see skin has formed over the entire area. A healed wound has a healthy, shiny look to the surface and is red to dark pink in color to normalize. Wounds may take approximately 4-6 weeks to heal. Larger wounds may take 6-8 weeks. After the wound is healed you may discontinue dressing changes.   You may experience a sensation of tightness as your wound heals. This is normal and will gradually subside.   Your healed wound may be sensitive to temperature changes. This sensitivity improves with time, but if you re having a lot of discomfort, try to avoid temperature extremes.   Patients frequently experience itching after their wound appears to have healed because of the continue healing under the skin. Plain Vaseline will help relieve the itching.                 Follow-ups after your visit        Who to contact     If you have questions or need follow up information about today's clinic visit or your schedule please contact [facility] directly at [phone].  Normal or non-critical lab and imaging results will be communicated to you by MyChart, letter or phone within 4 business days after the clinic has received the results. If you do not hear from us within 7 days, please contact the clinic through MyChart or phone. If you have a critical or abnormal lab result, we will notify you by phone as soon as possible.  Submit refill requests through [employer] or call your pharmacy and they will forward the refill request to us. Please allow 3 business days for your refill to be completed.          Additional Information About Your Visit        MyChart Information      iversity gives you secure access to your electronic health record. If you see a primary care provider, you can also send messages to your care team and make appointments. If you have questions, please call your primary care clinic.  If you do not have a primary care provider, please call [phone] and they will assist you.        Care EveryWhere ID     This is your Care EveryWhere ID. This could be used by other organizations to access your [facility] medical records  [id number]        Your Vitals Were     Pulse Last Period Pulse Oximetry Breastfeeding?          68 08/17/2018 100% No         Blood Pressure from Last 3 Encounters:   09/04/18 105/73   06/01/18 102/66   05/09/18 104/60    Weight from Last 3 Encounters:   06/01/18 65.3 kg (144 lb)   05/09/18 66.3 kg (146 lb 1.6 oz)   09/20/17 69.4 kg (153 lb)              Today, you had the following     No orders found for display       Primary Care Provider Office Phone # Fax #    Eileen LINDA Barragan [phone] [address] [address]        Equal Access to Services     [facility] : Hadii aad ku hadasho Soomaali, waaxda luqadaha, qaybta kaalmada adeegyada, lionel walkerin hayaan adesteff ren . So [facility] [phone].    ATENCIÓN: Si habla español, tiene a linton disposición servicios gratuitos de asistencia lingüística. Llame al [phone].    We comply with applicable federal civil rights laws and [place] laws. We do not discriminate on the basis of race, color, national origin, age, disability, sex, sexual orientation, or gender identity.            Thank you!     Thank you for choosing [facility]  for your care. Our goal is always to provide you with excellent care. Hearing back from our patients is one way we can continue to improve our services. Please take a few minutes to complete the written survey that you may receive in the mail after your visit with us. Thank you!             Your  Updated Medication List - Protect others around you: Learn how to safely use, store and throw away your medicines at www.disposemymeds.org.          This list is accurate as of 9/4/18 11:37 AM.  Always use your most recent med list.                   Brand Name Dispense Instructions for use Diagnosis    FISH OIL PO       Fatigue, unspecified type       levonorgestrel 20 MCG/24HR IUD    MIRENA (52 MG)    1 each    1 each (20 mcg) by Intrauterine route once for 1 dose    Encounter for IUD insertion       multivitamin per tablet     100    ONE DAILY        ranitidine 150 MG tablet    ZANTAC    60 tablet    Take 1 tablet (150 mg) by mouth 2 times daily    Gastroesophageal reflux disease without esophagitis

## 2018-09-04 NOTE — LETTER
9/4/2018         RE: Janay Tam  [address]  [facility] [address]        Dear Colleague,    Thank you for referring your patient, Janay Tam, to the [facility]. Please see a copy of my visit note below.    HPI:   Janay Tam is a 38 year old female who presents for Full skin cancer screening.  chief complaint  Last Skin Exam: none      1st Baseline: YES  Personal HX of Skin Cancer: none   Personal HX of Malignant Melanoma: none   Family HX of Skin Cancer / Malignant Melanoma: sister, not sure which type   Personal HX of Atypical Moles:   none  Risk factors: sun exposure, blistering sunburn in her youth  New / Changing lesions: yes, on both legs. Also, wart on right wrist.  Social History: has 3 sons ages 9, 7 and 3. Middle son is deaf.   On review of systems, there are no further skin complaints, patient is feeling otherwise well.  See patient intake sheet.  ROS of the following were done and are negative: Constitutional, Eyes, Ears, Nose,   Mouth, Throat, Cardiovascular, Respiratory, GI, Genitourinary, Musculoskeletal,   Psychiatric, Endocrine, Allergic/Immunologic.    This document serves as a record of the services and decisions personally performed and made by Sylvie Hauser, MS, PA-C. It was created on her behalf by Gogo Richter, a trained medical scribe. The creation of this document is based on the provider's statements to the medical scribe.  Gogo Richter 11:28 AM September 4, 2018    PHYSICAL EXAM:   /73  Pulse 68  LMP 08/17/2018  SpO2 100%  Breastfeeding? No  Skin exam performed as follows: Type 2 skin. Mood appropriate  Alert and Oriented X 3. Well developed, well nourished in no distress.  General appearance: Normal  Head including face: Normal  Eyes: conjunctiva and lids: Normal  Mouth: Lips, teeth, gums: Normal  Neck: Normal  Chest-breast/axillae: Normal  Back: Normal  Spleen and liver:  Normal  Cardiovascular: Exam of peripheral vascular system by observation for swelling, varicosities, edema: Normal  Genitalia: groin, buttocks: Normal  Extremities: digits/nails (clubbing): Normal  Eccrine and Apocrine glands: Normal  Right upper extremity: Normal  Left upper extremity: Normal  Right lower extremity: Normal  Left lower extremity: Normal  Skin: Scalp and body hair: See below    Pt deferred exam of breasts, groin, buttocks: No    Other physical findings:  1. Multiple pigmented macules on extremities and trunk  2. Multiple pigmented macules on face, trunk and extremities  3. Multiple vascular papules on trunk, arms and legs  4. Multiple scattered keratotic plaques  6. Verrucous papule on right wrist x 1       Except as noted above, no other signs of skin cancer or melanoma.     ASSESSMENT/PLAN:   Benign Full skin cancer screening today.      Patient with history of none  Advised on monthly self exams and 1 year  Patient Education: Appropriate brochures given.    Multiple benign appearing nevi on arms, legs and trunk. Discussed ABCDEs of melanoma and sunscreen.   Multiple lentigos on arms, legs and trunk. Advised benign, no treatment needed.  Multiple scattered angiomas. Advised benign, no treatment needed.   Seborrheic keratosis on arms, legs and trunk. Advised benign, no treatment needed.  Wart on the right wrist x 1. Irritated per patient so cryosurgery performed. Advised on blistering and post op care.   Varicose veins on bilateral legs which are painful. Will refer to [facility].      Follow-up: yearly FSE/PRN sooner    1.) Patient was asked about new and changing moles. YES  2.) Patient received a complete physical skin examination: YES  3.) Patient was counseled to perform a monthly self skin examination: YES  Scribed By: Gogo Richter, Medical Scribe    The information in this document, created by the medical scribe for me, accurately reflects the services I personally  performed and the decisions made by me. I have reviewed and approved this document for accuracy prior to leaving the patient care area.  September 4, 2018 11:37 AM    Sylvie Hauser MS, PAMARY ANN        Again, thank you for allowing me to participate in the care of your patient.        Sincerely,        Sylvie Hauser PA-C

## 2018-09-07 ENCOUNTER — TRANSFERRED RECORDS (OUTPATIENT)
Dept: HEALTH INFORMATION MANAGEMENT | Facility: CLINIC | Age: 38
End: 2018-09-07

## 2018-09-14 ENCOUNTER — APPOINTMENT (OUTPATIENT)
Dept: VASCULAR SURGERY | Facility: CLINIC | Age: 38
End: 2018-09-14
Payer: COMMERCIAL

## 2018-09-14 PROCEDURE — 99207 ZZC VEINSOLUTIONS FREE SCREENING: CPT | Performed by: SURGERY

## 2018-10-10 ENCOUNTER — ALLIED HEALTH/NURSE VISIT (OUTPATIENT)
Dept: NURSING | Facility: CLINIC | Age: 38
End: 2018-10-10
Payer: COMMERCIAL

## 2018-10-10 DIAGNOSIS — Z23 NEED FOR PROPHYLACTIC VACCINATION AND INOCULATION AGAINST INFLUENZA: Primary | ICD-10-CM

## 2018-10-10 PROCEDURE — 99207 ZZC NO CHARGE NURSE ONLY: CPT

## 2018-10-10 PROCEDURE — 90686 IIV4 VACC NO PRSV 0.5 ML IM: CPT

## 2018-10-10 PROCEDURE — 90471 IMMUNIZATION ADMIN: CPT

## 2018-10-10 NOTE — PROGRESS NOTES

## 2018-10-10 NOTE — MR AVS SNAPSHOT
After Visit Summary   10/10/2018    Janay Tam    MRN: [id number]           Patient Information     Date Of Birth          1980        Visit Information        Provider Department      10/10/2018 3:45 PM UP NURSE [facility]        Today's Diagnoses     Need for prophylactic vaccination and inoculation against influenza    -  1       Follow-ups after your visit        Your next 10 appointments already scheduled     Oct 12, 2018 12:15 PM CDT   Ultrasound Bilaterally with  [facility]   [facility]    [address]  [facility] [facility] [id number] [phone]            Oct 12, 2018  2:00 PM CDT   Ultrasound Results with Prieto Moran MD   [facility]    [address]  [facility] [id number] [phone]              Who to contact     If you have questions or need follow up information about today's clinic visit or your schedule please contact [facility] directly at [phone].  Normal or non-critical lab and imaging results will be communicated to you by [employer], letter or phone within 4 business days after the clinic has received the results. If you do not hear from us within 7 days, please contact the clinic through [employer] or phone. If you have a critical or abnormal lab result, we will notify you by phone as soon as possible.  Submit refill requests through [employer] or call your pharmacy and they will forward the refill request to us. Please allow 3 business days for your refill to be completed.          Additional Information About Your Visit        MyChart Information     [employer] gives you secure access to your electronic health record. If you see a primary care provider, you can also send messages to your care team and make appointments. If you have questions, please call your primary care clinic.  If  you do not have a primary care provider, please call [phone] and they will assist you.        Care EveryWhere ID     This is your Care EveryWhere ID. This could be used by other organizations to access your [facility] medical records  [id number]         Blood Pressure from Last 3 Encounters:   09/04/18 105/73   06/01/18 102/66   05/09/18 104/60    Weight from Last 3 Encounters:   06/01/18 144 lb (65.3 kg)   05/09/18 146 lb 1.6 oz (66.3 kg)   09/20/17 153 lb (69.4 kg)              We Performed the Following     FLU VACCINE, SPLIT VIRUS, IM (QUADRIVALENT) [50729]- >3 YRS     Vaccine Administration, Initial [21483]        Primary Care Provider Office Phone # Fax #    Eileen MCKEON Laurel  [phone] [address] [address]        Equal Access to Services     [facility] : Hadii aad ku hadasho Soomaali, waaxda luqadaha, qaybta kaalmada adeegyada, waxay idiin hayaan sidney khleoncio ren . So [facility] [phone].    ATENCIÓN: Si habla español, tiene a linton disposición servicios gratuitos de asistencia lingüística. Llame al [phone].    We comply with applicable federal civil rights laws and [place] laws. We do not discriminate on the basis of race, color, national origin, age, disability, sex, sexual orientation, or gender identity.            Thank you!     [facility]  [facility]. Our goal is always to provide you with excellent care. Hearing back from our patients is one way we can continue to improve our services. Please take a few minutes to complete the written survey that you may receive in the mail after your visit with us. Thank you!             Your Updated Medication List - Protect others around you: Learn how to safely use, store and throw away your medicines at www.disposemymeds.org.          This list is accurate as of 10/10/18  4:57 PM.  Always use your most recent med list.                   Brand Name Dispense  Instructions for use Diagnosis    FISH OIL PO       Fatigue, unspecified type       levonorgestrel 20 MCG/24HR IUD    MIRENA (52 MG)    1 each    1 each (20 mcg) by Intrauterine route once for 1 dose    Encounter for IUD insertion       multivitamin per tablet     100    ONE DAILY        ranitidine 150 MG tablet    ZANTAC    60 tablet    Take 1 tablet (150 mg) by mouth 2 times daily    Gastroesophageal reflux disease without esophagitis

## 2018-11-01 ENCOUNTER — OFFICE VISIT (OUTPATIENT)
Dept: OBGYN | Facility: CLINIC | Age: 38
End: 2018-11-01
Payer: COMMERCIAL

## 2018-11-01 VITALS
SYSTOLIC BLOOD PRESSURE: 112 MMHG | WEIGHT: 145 LBS | HEART RATE: 62 BPM | DIASTOLIC BLOOD PRESSURE: 64 MMHG | HEIGHT: 63 IN | BODY MASS INDEX: 25.69 KG/M2

## 2018-11-01 DIAGNOSIS — N89.8 VAGINAL ITCHING: Primary | ICD-10-CM

## 2018-11-01 LAB
SPECIMEN SOURCE: ABNORMAL
WET PREP SPEC: ABNORMAL

## 2018-11-01 PROCEDURE — 87210 SMEAR WET MOUNT SALINE/INK: CPT | Performed by: NURSE PRACTITIONER

## 2018-11-01 PROCEDURE — 99213 OFFICE O/P EST LOW 20 MIN: CPT | Performed by: NURSE PRACTITIONER

## 2018-11-01 RX ORDER — FLUCONAZOLE 150 MG/1
150 TABLET ORAL
Qty: 2 TABLET | Refills: 0 | Status: SHIPPED | OUTPATIENT
Start: 2018-11-01 | End: 2019-06-04

## 2018-11-01 NOTE — PROGRESS NOTES
SUBJECTIVE:                                                   Janay Tam is a 38 year old female who presents to clinic today for the following health issue(s):  Patient presents with:  Vaginal Problem: possible yeast inf       HPI:  Pt here today with c/o vaginal itching for about 1 week. She notices it's right before and/or after her period.     She had her IUD removed at her last annual exam with IM.     She works out every morning. She gets out of her workout clothes quickly.     Patient's last menstrual period was 10/20/2018..   Patient is sexually active, .  Using vasectomy for contraception.    reports that she has never smoked. She has never used smokeless tobacco.    STD testing offered?  Declined    Health maintenance updated:  yes    Today's PHQ-2 Score:   PHQ-2 (  [employer]) 2016   Q1: Little interest or pleasure in doing things 0   Q2: Feeling down, depressed or hopeless 0   PHQ-2 Score 0     Today's PHQ-9 Score:   PHQ-9 SCORE 2018   Total Score -   Total Score 0     Today's MILA-7 Score:   MILA-7 SCORE 2018   Total Score 5       Problem list and histories reviewed & adjusted, as indicated.  Additional history: as documented.    Patient Active Problem List   Diagnosis     Allergic rhinitis     CARDIOVASCULAR SCREENING; LDL GOAL LESS THAN 160     Anxiety     Gastroesophageal reflux disease without esophagitis     Past Surgical History:   Procedure Laterality Date     C IUD,MIRENA  2010     NO HISTORY OF SURGERY        Social History   Substance Use Topics     Smoking status: Never Smoker     Smokeless tobacco: Never Used     Alcohol use 0.0 oz/week     0 Standard drinks or equivalent per week      Comment: very rare      Problem (# of Occurrences) Relation (Name,Age of Onset)    Arthritis (1) Paternal Grandmother    C.A.D. (2) Father: heart problems, Maternal Grandfather: heart problems    Cerebrovascular Disease (2) Father, Paternal Grandmother    Depression (1) Mother  "   Diabetes (1) Maternal Grandfather    Hyperlipidemia (1) Mother    Hypertension (1) Mother    Neurologic Disorder (2) Sister: mental health, Brother: mental health    Osteoporosis (1) Paternal Grandmother    Psychotic Disorder (1) Mother    Skin Cancer (1) Sister    Thyroid Disease (2) Mother, Sister            Current Outpatient Prescriptions   Medication Sig     fluconazole (DIFLUCAN) 150 MG tablet Take 1 tablet (150 mg) by mouth every 3 days     MULTIVITAMINS OR TABS ONE DAILY     Omega-3 Fatty Acids (FISH OIL PO)      ranitidine (ZANTAC) 150 MG tablet Take 1 tablet (150 mg) by mouth 2 times daily     No current facility-administered medications for this visit.      Facility-Administered Medications Ordered in Other Visits   Medication     lidocaine-EPINEPHrine 1.5 %-1:231380 injection     ROPivacaine (NAROPIN) injection     Allergies   Allergen Reactions     Penicillins        ROS:  12 point review of systems negative other than symptoms noted below.  Gastrointestinal: Heartburn  Genitourinary: Urgency, Vaginal Dryness and Vaginal Itching  Skin: Skin Dryness  Psychiatric: Anxiety and Ortiz    OBJECTIVE:     /64  Pulse 62  Ht 5' 3\" (1.6 m)  Wt 145 lb (65.8 kg)  LMP 10/20/2018  BMI 25.69 kg/m2  Body mass index is 25.69 kg/(m^2).    Exam:  Constitutional:  Appearance: Well nourished, well developed alert, in no acute distress  Neurologic/Psychiatric:  Mental Status:  Oriented X3   Pelvic Exam:  External Genitalia:     Normal appearance for age, no discharge present, no tenderness present, no inflammatory lesions present, color normal  Vagina:     Normal vaginal vault without central or paravaginal defects, white curd like discharge present, no inflammatory lesions present, no masses present  Bladder:     Nontender to palpation  Urethra:   Urethral Body:  Urethra palpation normal, urethra structural support normal   Urethral Meatus:  No erythema or lesions present  Cervix:     Appearance healthy, no " lesions present, nontender to palpation, no bleeding present  Uterus:     Uterus: firm, normal sized and nontender, midplane in position.   Adnexa:     No adnexal tenderness present, no adnexal masses present  Perineum:     Perineum within normal limits, no evidence of trauma, no rashes or skin lesions present  Anus:     Anus within normal limits, no hemorrhoids present  Inguinal Lymph Nodes:     No lymphadenopathy present  Pubic Hair:     Normal pubic hair distribution for age  Genitalia and Groin:     No rashes present, no lesions present, no areas of discoloration, no masses present       In-Clinic Test Results:  Results for orders placed or performed in visit on 11/01/18 (from the past 24 hour(s))   Wet  Prep   Result Value Ref Range    Specimen Description Vagina     Wet Prep Yeast seen (A)     Wet Prep No clue cells seen     Wet Prep No Trichomonas seen        ASSESSMENT/PLAN:                                                        ICD-10-CM    1. Vaginal itching N89.8 Wet  Prep     fluconazole (DIFLUCAN) 150 MG tablet       There are no Patient Instructions on file for this visit.    Wet prep: + yeast. Will treat with oral diflucan  Discussed sitz baths.     OLIVIA Kee [facility]

## 2018-11-01 NOTE — MR AVS SNAPSHOT
"              After Visit Summary   11/1/2018    Janay Tam    MRN: [id number]           Patient Information     Date Of Birth          1980        Visit Information        Provider Department      11/1/2018 9:30 AM Candace Gold APRN CNP [facility]        Today's Diagnoses     Vaginal itching    -  1       Follow-ups after your visit        Who to contact     If you have questions or need follow up information about today's clinic visit or your schedule please contact [facility] directly at [phone].  Normal or non-critical lab and imaging results will be communicated to you by Smart Panelhart, letter or phone within 4 business days after the clinic has received the results. If you do not hear from us within 7 days, please contact the clinic through Smart Panelhart or phone. If you have a critical or abnormal lab result, we will notify you by phone as soon as possible.  Submit refill requests through [employer] or call your pharmacy and they will forward the refill request to us. Please allow 3 business days for your refill to be completed.          Additional Information About Your Visit        MyChart Information     [employer] gives you secure access to your electronic health record. If you see a primary care provider, you can also send messages to your care team and make appointments. If you have questions, please call your primary care clinic.  If you do not have a primary care provider, please call [phone] and they will assist you.        Care EveryWhere ID     This is your Care EveryWhere ID. This could be used by other organizations to access your [facility] medical records  [id number]        Your Vitals Were     Pulse Height Last Period BMI (Body Mass Index)          62 5' 3\" (1.6 m) 10/20/2018 25.69 kg/m2         Blood Pressure from Last 3 Encounters:   11/01/18 112/64   09/04/18 105/73   06/01/18 102/66    Weight from Last 3 Encounters:   11/01/18 145 lb " (65.8 kg)   06/01/18 144 lb (65.3 kg)   05/09/18 146 lb 1.6 oz (66.3 kg)              We Performed the Following     Wet  Prep          Today's Medication Changes          These changes are accurate as of 11/1/18 10:10 AM.  If you have any questions, ask your nurse or doctor.               Start taking these medicines.        Dose/Directions    fluconazole 150 MG tablet   Commonly known as:  DIFLUCAN   Used for:  Vaginal itching   Started by:  Candace Gold APRN CNP        Dose:  150 mg   Take 1 tablet (150 mg) by mouth every 3 days   Quantity:  2 tablet   Refills:  0            Where to get your medicines      These medications were sent to [facility] - [place], [place] - [address] [facility] [address]  [address] [facility] [address] [address]     Phone:  [phone]     fluconazole 150 MG tablet                Primary Care Provider Office Phone # Fax #    Eileen MCKEON DO [place] [phone] [address] [facility]  [address]  [facility] [address]        Equal Access to Services     [facility] AH: Hadii aad ku hadasho Soomaali, waaxda luqadaha, qaybta kaalmada adeegyada, waxay idiin hayaan sidney ren . So [facility] [phone].    ATENCIÓN: Si habla español, tiene a linton disposición servicios gratuitos de asistencia lingüística. Ll[facility] [phone].    We comply with applicable federal civil rights laws and [place] laws. We do not discriminate on the basis of race, color, national origin, age, disability, sex, sexual orientation, or gender identity.            Thank you!     Thank you for choosing [facility]  for your care. Our goal is always to provide you with excellent care. Hearing back from our patients is one way we can continue to improve our services. Please take a few minutes to complete the written survey that you may receive in the mail after your visit with us. Thank you!             Your Updated Medication List - Protect others around you: Learn how to safely use, store and  throw away your medicines at www.disposemymeds.org.          This list is accurate as of 11/1/18 10:10 AM.  Always use your most recent med list.                   Brand Name Dispense Instructions for use Diagnosis    FISH OIL PO       Fatigue, unspecified type       fluconazole 150 MG tablet    DIFLUCAN    2 tablet    Take 1 tablet (150 mg) by mouth every 3 days    Vaginal itching       multivitamin per tablet     100    ONE DAILY        ranitidine 150 MG tablet    ZANTAC    60 tablet    Take 1 tablet (150 mg) by mouth 2 times daily    Gastroesophageal reflux disease without esophagitis

## 2018-11-09 ENCOUNTER — TELEPHONE (OUTPATIENT)
Dept: PEDIATRICS | Age: 38
End: 2018-11-09

## 2018-11-09 NOTE — TELEPHONE ENCOUNTER
Janay is looking to have herself, and possibly her child and , scheduled for genetic testing to determine the cause of epilepsy. I reached out to Janay to get some additional information, particularly who in the family is affected to help determine who should be tested. I provided my direct contact information for a return call to schedule an appointment.

## 2019-06-04 ENCOUNTER — OFFICE VISIT (OUTPATIENT)
Dept: OBGYN | Facility: CLINIC | Age: 39
End: 2019-06-04
Payer: COMMERCIAL

## 2019-06-04 VITALS
SYSTOLIC BLOOD PRESSURE: 122 MMHG | DIASTOLIC BLOOD PRESSURE: 74 MMHG | HEIGHT: 64 IN | BODY MASS INDEX: 24.92 KG/M2 | WEIGHT: 146 LBS

## 2019-06-04 DIAGNOSIS — Z01.419 ENCOUNTER FOR GYNECOLOGICAL EXAMINATION WITHOUT ABNORMAL FINDING: Primary | ICD-10-CM

## 2019-06-04 PROCEDURE — 99395 PREV VISIT EST AGE 18-39: CPT | Performed by: OBSTETRICS & GYNECOLOGY

## 2019-06-04 ASSESSMENT — ANXIETY QUESTIONNAIRES
GAD7 TOTAL SCORE: 5
7. FEELING AFRAID AS IF SOMETHING AWFUL MIGHT HAPPEN: MORE THAN HALF THE DAYS
IF YOU CHECKED OFF ANY PROBLEMS ON THIS QUESTIONNAIRE, HOW DIFFICULT HAVE THESE PROBLEMS MADE IT FOR YOU TO DO YOUR WORK, TAKE CARE OF THINGS AT HOME, OR GET ALONG WITH OTHER PEOPLE: NOT DIFFICULT AT ALL
3. WORRYING TOO MUCH ABOUT DIFFERENT THINGS: SEVERAL DAYS
2. NOT BEING ABLE TO STOP OR CONTROL WORRYING: NOT AT ALL
5. BEING SO RESTLESS THAT IT IS HARD TO SIT STILL: NOT AT ALL
6. BECOMING EASILY ANNOYED OR IRRITABLE: SEVERAL DAYS
1. FEELING NERVOUS, ANXIOUS, OR ON EDGE: SEVERAL DAYS

## 2019-06-04 ASSESSMENT — MIFFLIN-ST. JEOR: SCORE: 1314.31

## 2019-06-04 ASSESSMENT — PATIENT HEALTH QUESTIONNAIRE - PHQ9
SUM OF ALL RESPONSES TO PHQ QUESTIONS 1-9: 1
5. POOR APPETITE OR OVEREATING: NOT AT ALL

## 2019-06-04 NOTE — PROGRESS NOTES
"  Janay is a 38 year old  female who presents for annual exam.     Besides routine health maintenance,  she would like to discuss breast issues, L breast soreness/\"tingling\", discuss sexual health and energy levels     HPI:  The patient's PCP is Eileen Barragan DO.    Patient does a lot of wts with exercise. Notes some pulling tenderness on both sides with muscle movement, prob from her exercise.   Doesn't get enough sleep, only 7 hrs. Gets up at 5am for gym. Home with kids during the day.    Vasectomy for contraception   She had an IUD in past and notes said that it decreased her libido. It was removed due to that.   No family history of breast cancer.   Has sibling with thryoid disease. Patient is not gaining weight. She is within 2 lbs of her wt from last years visit.     She was seen for vaginal itching 6 months ago by Candace Gold. She had some yeast at that time and was treated with diflucan.        GYNECOLOGIC HISTORY:    Patient's last menstrual period was 2019 (exact date).  Her current contraception method is: vasectomy.  She  reports that she has never smoked. She has never used smokeless tobacco.    Patient is sexually active.  STD testing offered?  Declined  Last PHQ-9 score on record =   PHQ-9 SCORE 2019   PHQ-9 Total Score -   PHQ-9 Total Score 1     Last GAD7 score on record =   MILA-7 SCORE 2019   Total Score 5     Alcohol Score = 2    HEALTH MAINTENANCE:  Cholesterol:   Cholesterol   Date Value Ref Range Status   2005 148 0 - 200 mg/dL Final     Comment:     Cholesterol Reference Range:   <200  The NCEP recommends further         evaluation of:         1.  Patients with cholesterol             greater than 200 mg/dL             if additional risk factors             are present.         2.  All patients with a             cholesterol greater than             240 mg/dL.      Last Mammo: 15, Result: diagnostic, Next Mammo: next year   Pap:   Lab Results   Component Value " Date    PAP NIL 2017    PAP NIL 2014    PAP NIL 2012 WNL   Colonoscopy:  NA, Result: not applicable, Next Colonoscopy: NA years.  Dexa:  NA    Health maintenance updated:  yes    HISTORY:  OB History    Para Term  AB Living   3 3 3 0 0 3   SAB TAB Ectopic Multiple Live Births   0 0 0 0 3      # Outcome Date GA Lbr John/2nd Weight Sex Delivery Anes PTL Lv   3 Term 02/10/15 38w5d 01:15 / 03:05 3.912 kg (8 lb 10 oz) M Vag-Spont EPI  EDU      Apgar1: 8  Apgar5: 9   2 Term 10/09/10 39w0d  3.572 kg (7 lb 14 oz) M  EPI  DEU      Birth Comments: CMV+,  son with hearing impairment   1 Term 10/31/08 38w0d  3.289 kg (7 lb 4 oz) M    EDU       Patient Active Problem List   Diagnosis     Allergic rhinitis     CARDIOVASCULAR SCREENING; LDL GOAL LESS THAN 160     Anxiety     Gastroesophageal reflux disease without esophagitis     Past Surgical History:   Procedure Laterality Date     C IUD,MIRENA  2010      Social History     Tobacco Use     Smoking status: Never Smoker     Smokeless tobacco: Never Used   Substance Use Topics     Alcohol use: Yes     Alcohol/week: 0.0 oz     Comment: very rare      Problem (# of Occurrences) Relation (Name,Age of Onset)    Arthritis (1) Paternal Grandmother    C.A.D. (2) Father: heart problems, Maternal Grandfather: heart problems    Cerebrovascular Disease (2) Father, Paternal Grandmother    Depression (1) Mother    Diabetes (1) Maternal Grandfather    Hyperlipidemia (1) Mother    Hypertension (1) Mother    Neurologic Disorder (2) Sister: mental health, Brother: mental health    Osteoporosis (1) Paternal Grandmother    Psychotic Disorder (1) Mother    Skin Cancer (1) Sister    Thyroid Disease (2) Mother, Sister            Current Outpatient Medications   Medication Sig     MULTIVITAMINS OR TABS ONE DAILY     Omega-3 Fatty Acids (FISH OIL PO)      ranitidine (ZANTAC) 150 MG tablet Take 1 tablet (150 mg) by mouth 2 times daily     No current  "facility-administered medications for this visit.      Allergies   Allergen Reactions     Penicillins        Past medical, surgical, social and family histories were reviewed and updated in EPIC.    ROS:   12 point review of systems negative other than symptoms noted below.  Gastrointestinal: Heartburn  Genitourinary: Night Sweats and Vaginal Itching  Endocrine: Decreased Libido  Psychiatric: Anxiety    EXAM:  /74   Ht 1.613 m (5' 3.5\")   Wt 66.2 kg (146 lb)   LMP 04/20/2019 (Exact Date)   Breastfeeding? No   BMI 25.46 kg/m     BMI: Body mass index is 25.46 kg/m .    PHYSICAL EXAM:  Constitutional:  Appearance: Well nourished, well developed, alert, in no acute distress  Neck:  Lymph Nodes:  No lymphadenopathy present    Thyroid:  Gland size normal, nontender, no nodules or masses present  on palpation  Chest:  Respiratory Effort:  Breathing unlabored  Cardiovascular:    Breasts: Inspection of Breasts:  No lymphadenopathy present., Palpation of Breasts and Axillae:  No masses present on palpation, no breast tenderness., Axillary Lymph Nodes:  No lymphadenopathy present. and No nodularity, asymmetry or nipple discharge bilaterally.  Gastrointestinal:   Abdominal Examination:  Abdomen nontender to palpation, tone normal without rigidity or guarding, no masses present, umbilicus without lesions   Liver and Spleen:  No hepatomegaly present, liver nontender to palpation    Hernias:  No hernias present    Skin:     Genitalia and Groin:  No rashes present, no lesions present, no areas of  discoloration, no masses present  Neurologic/Psychiatric:    Mental Status:  Oriented X3     Pelvic Exam:  External Genitalia:     Normal appearance for age, no discharge present, no tenderness present, no inflammatory lesions present, color normal  Vagina:     Normal vaginal vault without central or paravaginal defects, no discharge present, no inflammatory lesions present, no masses present  Bladder:     Nontender to " palpation  Urethra:   Urethral Body:  Urethra palpation normal, urethra structural support normal   Urethral Meatus:  No erythema or lesions present  Cervix:     Appearance healthy, no lesions present, nontender to palpation, no bleeding present  Uterus:     Uterus: firm, normal sized and nontender, anteverted in position.   Adnexa:     No adnexal tenderness present, no adnexal masses present  Perineum:     Perineum within normal limits, no evidence of trauma, no rashes or skin lesions present  Anus:     Anus within normal limits, no hemorrhoids present  Inguinal Lymph Nodes:     No lymphadenopathy present  Pubic Hair:     Normal pubic hair distribution for age  Genitalia and Groin:     No rashes present, no lesions present, no areas of discoloration, no masses present      COUNSELING:   Reviewed preventive health counseling, as reflected in patient instructions       Regular exercise       Healthy diet/nutrition    BMI: Body mass index is 25.46 kg/m .  Weight management plan: Discussed healthy diet and exercise guidelines    ASSESSMENT:  38 year old female with satisfactory annual exam.    ICD-10-CM    1. Encounter for gynecological examination without abnormal finding Z01.419        PLAN:  I think the pulling she feels is from her exercise. Normal breast exam today and in past.   The discomfort was mainly over muscular area and axillary tail of breast on both sides, feels it as tingling. Says she does wts as part of her exercise.   She has very busy young children  She had a normal thyroid test 2 years ago  Mammograms aren't due till 39 and I didn't think she needed it based on her exercise history.   She had similar concerns in past    I reviewed prior notes from Dr Pozo's visits with her in the past    Discussed sleep and likely need to increase her sleep time.  Fatigue is to be expected on only 7 hrs.   Discussed that adults typically require 7-9 hours of solid sleep to feel well rested so her schedule may  be impeding that. She said she usually goes to bed around 10 pm, up most days by 5am.   She looks forward to the exercise since it is her time for herself which is a really healthy attitude.     Reviewed her prior lab records of gyn office notes today.TSH normal 2017.       Has child born with congenital CMV  She said that has been a difficult burden in their family and probably adds to her stress levels.     Return 1 years          Nafisa Jacobs MD

## 2019-06-05 ENCOUNTER — MYC MEDICAL ADVICE (OUTPATIENT)
Dept: OBGYN | Facility: CLINIC | Age: 39
End: 2019-06-05

## 2019-06-05 ASSESSMENT — ANXIETY QUESTIONNAIRES: GAD7 TOTAL SCORE: 5

## 2019-06-05 NOTE — TELEPHONE ENCOUNTER
Routing to provider to address MyChart message concerns.   Meagan Robles RN on 6/5/2019 at 8:51 AM

## 2019-06-22 ENCOUNTER — MYC MEDICAL ADVICE (OUTPATIENT)
Dept: OBGYN | Facility: CLINIC | Age: 39
End: 2019-06-22

## 2019-06-25 NOTE — TELEPHONE ENCOUNTER
Pt [employer] message routed to Dr Jacobs to address when she returns to the office.  Pt ok to wait until return 7/1/19    Dr Jacobs please call pt when able -  Phone: [phone]

## 2019-07-02 NOTE — TELEPHONE ENCOUNTER
I called and discussed patient concerns.  She is mainly worried about intermittent breast discomfort which is usually just before menses and sometimes for a day midcycle.     No family history of breast cancer  Normal breast exam recently at yearly  Not on any hormones  Not on any medications    Exercises at gym daily with weights, sometimes has some axillary pain afterwards    Reassured patient that type of discomfort is normal and doesn't require imaging.    Related to hormonal changes.     Patient had normal TSH 2 yrs ago and Dr Pozo didn't repeat it last year or write that it should be done every year.   Patient has no symptoms of thyroid- no wt change or hair loss or temp intolerance so screening every several yrs should be adequate.     Will send patient a patient education article on breast pain from [employer].     Asked patient to call me again if she thinks of any other questions.

## 2019-08-20 ENCOUNTER — MYC MEDICAL ADVICE (OUTPATIENT)
Dept: OBGYN | Facility: CLINIC | Age: 39
End: 2019-08-20

## 2019-09-12 ENCOUNTER — OFFICE VISIT (OUTPATIENT)
Dept: FAMILY MEDICINE | Facility: CLINIC | Age: 39
End: 2019-09-12
Payer: COMMERCIAL

## 2019-09-12 VITALS — SYSTOLIC BLOOD PRESSURE: 104 MMHG | DIASTOLIC BLOOD PRESSURE: 60 MMHG

## 2019-09-12 DIAGNOSIS — L57.0 ACTINIC KERATOSES: ICD-10-CM

## 2019-09-12 DIAGNOSIS — L57.8 SOLAR ELASTOSIS: ICD-10-CM

## 2019-09-12 DIAGNOSIS — L82.0 INFLAMED SEBORRHEIC KERATOSIS: Primary | ICD-10-CM

## 2019-09-12 PROCEDURE — 99213 OFFICE O/P EST LOW 20 MIN: CPT | Mod: 25 | Performed by: PHYSICIAN ASSISTANT

## 2019-09-12 PROCEDURE — 17003 DESTRUCT PREMALG LES 2-14: CPT | Mod: 59 | Performed by: PHYSICIAN ASSISTANT

## 2019-09-12 PROCEDURE — 17000 DESTRUCT PREMALG LESION: CPT | Mod: 59 | Performed by: PHYSICIAN ASSISTANT

## 2019-09-12 PROCEDURE — 17110 DESTRUCTION B9 LES UP TO 14: CPT | Performed by: PHYSICIAN ASSISTANT

## 2019-09-12 NOTE — PATIENT INSTRUCTIONS
Proper skin care from [facility]:    -Eliminate harsh soaps as they strip the natural oils from the skin, often resulting in dry itchy skin ( i.e. Dial, Zest, Gwen Spring)  -Use mild soaps such as Cetaphil or Dove Sensitive Skin in the shower. You do not need to use soap on arms, legs, and trunk every time you shower unless visibly soiled.   -Avoid hot or cold showers.  -After showering, lightly dry off and apply moisturizing within 2-3 minutes. This will help trap moisture in the skin.   -Aggressive use of a moisturizer at least 1-2 times a day to the entire body (including -Vanicream, Cetaphil, Aquaphor or Cerave) and moisturize hands after every washing.  -We recommend using moisturizers that come in a tub that needs to be scooped out, not a pump. This has more of an oil base. It will hold moisture in your skin much better than a water base moisturizer. The above recommended are non-pore clogging.      Wear a sunscreen with at least SPF 30 on your face, ears, neck and V of the chest daily. Wear sunscreen on other areas of the body if those areas are exposed to the sun throughout the day. Sunscreens can contain physical and/or chemical blockers. Physical blockers are less likely to clog pores, these include zinc oxide and titanium dioxide. Reapply every two hour and after swimming. Sunscreen examples include Neutrogena, CeraVe, Blue Lizard, Elta MD and many others.    UV radiation  UVA radiation remains constant throughout the day and throughout the year. It is a longer wavelength than UVB and therefore penetrates deeper into the skin leading to immediate and delayed tanning, photoaging, and skin cancer. 70-80% of UVA and UVB radiation occurs between the hours of 10am-2pm.  UVB radiation  UVB radiation causes the most harmful effects and is more significant during the summer months. However, snow and ice can reflect UVB radiation leading to skin damage during the winter months as well. UVB radiation is  responsible for tanning, burning, inflammation, delayed erythema (pinkness), pigmentation (brown spots), and skin cancer.     WOUND CARE INSTRUCTIONS  FOR CRYOSURGERY        This area treated with liquid nitrogen will form a blister. You do not need to bandage the area until after the blister forms and breaks (which may be a few days).  When the blister breaks, begin daily dressing changes as follows:    1) Clean and dry the area with tap water using clean Q-tip or sterile gauze pad.    2) Apply Polysporin ointment or Bacitracin ointment over entire wound.  Do NOT use Neosporin ointment.    3) Cover the wound with a band-aid or sterile non-stick gauze pad and micropore paper tape.      REPEAT THESE INSTRUCTIONS AT LEAST ONCE A DAY UNTIL THE WOUND HAS COMPLETELY HEALED.        It is an old wives tale that a wound heals better when it is exposed to air and allowed to dry out. The wound will heal faster with a better cosmetic result if it is kept moist with ointment and covered with a bandage.  Do not let the wound dry out.      Supplies Needed:     *Cotton tipped applicators (Q-tips)   *Polysporin ointment or Bacitracin ointment (NOT NEOSPORIN)   *Band-aids, or non stick gauze pads and micropore paper tape    PATIENT INFORMATION    During the healing process you will notice a number of changes. All wounds develop a small halo of redness surrounding the wound.  This means healing is occurring. Severe itching with extensive redness usually indicates sensitivity to the ointment or bandage tape used to dress the wound.  You should call our office if this develops.      Swelling and/or discoloration around your surgical site is common, particularly when performed around the eye.    All wounds normally drain.  The larger the wound the more drainage there will be.  After 7-10 days, you will notice the wound beginning to shrink and new skin will begin to grow.  The wound is healed when you can see skin has formed over the  entire area.  A healed wound has a healthy, shiny look to the surface and is red to dark pink in color to normalize.  Wounds may take approximately 4-6 weeks to heal.  Larger wounds may take 6-8 weeks.  After the wound is healed you may discontinue dressing changes.    You may experience a sensation of tightness as your wound heals. This is normal and will gradually subside.    Your healed wound may be sensitive to temperature changes. This sensitivity improves with time, but if you re having a lot of discomfort, try to avoid temperature extremes.    Patients frequently experience itching after their wound appears to have healed because of the continue healing under the skin.  Plain Vaseline will help relieve the itching.

## 2019-09-12 NOTE — LETTER
9/12/2019         RE: Janay Tam  [address] [place] [address]        Dear Colleague,    Thank you for referring your patient, Janay Tam, to the [facility] [place] Please see a copy of my visit note below.    HPI:  Janay Tam is a 39 year old female patient here today for dry patches on forehead and upper lip .  Patient states this has been present for a while.  Patient reports the following symptoms: scaly .  Patient reports the following previous treatments: ln2 to patches near lip with resolution but have since returned.  Patient reports the following modifying factors: none.  Associated symptoms: none. Also has an irritated spot on right leg for a while. No tx tried. .  Patient has no other skin complaints today.  Remainder of the HPI, Meds, PMH, Allergies, FH, and SH was reviewed in chart.    Pertinent Hx:   No personal history of skin cancer. Sister had a skin cancer.     Past Medical History:   Diagnosis Date     Allergic rhinitis, cause unspecified     Allergic rhinitis     Cytomegaloviral disease (H) 2010    Second child with hearing loss. 6/14 IgM neg, IgG pos     Depressive disorder, not elsewhere classified     use effexor in the past - more seasonal     Generalized anxiety disorder      Papanicolaou smear of cervix with atypical squamous cells of undetermined significance (ASC-US) 2001    Unsure of outcome     Postpartum depression     with first delivery, not currently medicated       Past Surgical History:   Procedure Laterality Date     C IUD,MIRENA  12/2010        Family History   Problem Relation Age of Onset     C.A.D. Father         heart problems     Cerebrovascular Disease Father      C.A.D. Maternal Grandfather         heart problems     Diabetes Maternal Grandfather      Hypertension Mother      Depression Mother      Psychotic Disorder Mother      Hyperlipidemia Mother      Thyroid Disease Mother      Cerebrovascular Disease Paternal  Grandmother      Osteoporosis Paternal Grandmother      Arthritis Paternal Grandmother      Neurologic Disorder Sister         mental health     Skin Cancer Sister      Neurologic Disorder Brother         mental health     Thyroid Disease Sister        Social History     Socioeconomic History     Marital status:      Spouse name: Not on file     Number of children: 3     Years of education: Not on file     Highest education level: Not on file   Occupational History     Employer: [employer]OMEM[employer]ER   Social Needs     Financial resource strain: Not on file     Food insecurity:     Worry: Not on file     Inability: Not on file     Transportation needs:     Medical: Not on file     Non-medical: Not on file   Tobacco Use     Smoking status: Never Smoker     Smokeless tobacco: Never Used   Substance and Sexual Activity     Alcohol use: Yes     Alcohol/week: 0.0 oz     Comment: very rare     Drug use: No     Sexual activity: Yes     Partners: Male     Birth control/protection: Male Surgical     Comment: vasectomy   Lifestyle     Physical activity:     Days per week: Not on file     Minutes per session: Not on file     Stress: Not on file   Relationships     Social connections:     Talks on phone: Not on file     Gets together: Not on file     Attends Sabianist service: Not on file     Active member of club or organization: Not on file     Attends meetings of clubs or organizations: Not on file     Relationship status: Not on file     Intimate partner violence:     Fear of current or ex partner: Not on file     Emotionally abused: Not on file     Physically abused: Not on file     Forced sexual activity: Not on file   Other Topics Concern     Parent/sibling w/ CABG, MI or angioplasty before 65F 55M? Not Asked   Social History Narrative    Social Documentation:10/21/09        Balanced Diet: YES    Calcium intake: 2 per day    Caffeine: 1-2 cup per day    Exercise:  type of activity walking, lift wts ;  7 times per week     Sunscreen: Yes     Seatbelts:  Yes    Self Breast Exam:  Yes  sometimes    Self Testicular Exam: No - n/a    Physical/Emotional/Sexual Abuse: No    Do you feel safe in your environment? Yes        Cholesterol screen up to date:  No    Eye Exam up to date: Yes    Dental Exam up to date: Yes    Pap smear up to date: Yes    Mammogram up to date: Does Not Apply    Dexa Scan up to date: Does Not Apply    Colonoscopy up to date: Does Not Apply    Immunizations up to date: Yes td 2000    Glucose screen if over 40:   N/a        Eleanor Perdue [facility]            Outpatient Encounter Medications as of 9/12/2019   Medication Sig Dispense Refill     MULTIVITAMINS OR TABS ONE DAILY 100 1 YEAR     Omega-3 Fatty Acids (FISH OIL PO)        ranitidine (ZANTAC) 150 MG tablet Take 1 tablet (150 mg) by mouth 2 times daily 60 tablet 1     No facility-administered encounter medications on file as of 9/12/2019.        Review Of Systems:  Skin: As above  Eyes: negative  Ears/Nose/Throat: negative  Respiratory: No shortness of breath, dyspnea on exertion, cough, or hemoptysis  Cardiovascular: negative  Gastrointestinal: negative  Genitourinary: negative  Musculoskeletal: negative  Neurologic: negative  Psychiatric: negative  Hematologic/Lymphatic/Immunologic: negative  Endocrine: negative      Objective:     /60   Breastfeeding? No   Eyes: Conjunctivae/lids: Normal   ENT: Lips:  Normal  MSK: Normal  Cardiovascular: Peripheral edema none  Pulm: Breathing Normal  Neuro/Psych: Orientation: Normal; Mood/Affect: Normal, NAD, WDWN  Pt accompanied by: lina mills  Following areas examined: face, neck, right leg.   Malcolm skin type:ii   Findings:  Pink scaly macule/s x 7 on forehead and right upper cutaneous lip  Inflamed brown, stuck-on scaly appearing papules on right leg  Assessment and Plan:  1) Actinic keratoses x 7 and solar elastosis    LN2 for 5 seconds x 2. Discussed AE include hypopigmentation (white spot) and recurrence. Follow  up in 2-3 months to recheck lesions. There is a risk of AKs developing into a SCC.   Treatment options include LN2 vs PDT vs Efudex. Pt elected LN2     2) inflamed verrucous sk    LN2: Treated with LN2 for 5s for 1-2 cycles. Warned risks of blistering, pain, pigment change, scarring, and incomplete resolution.  Advised patient to return if lesions do not completely resolve within 2-3 months.  Wound care sheet given.    Signs and Symptoms of non-melanoma skin cancer and ABCDEs of melanoma reviewed with patient. Patient encouraged to perform monthly self skin exams and educated on how to perform them. UV precautions reviewed with patient. Patient was asked about new or changing moles/lesions on body.   Wear a sunscreen with at least SPF 30 on your face, ears, neck and V of the chest daily. Wear sunscreen on other areas of the body if those areas are exposed to the sun throughout the day. Sunscreens can contain physical and/or chemical blockers. Physical blockers are less likely to clog pores, these include zinc oxide and titanium dioxide. Reapply every two hour and after swimming. Sunscreen examples include Neutrogena, CeraVe, Blue Lizard, Elta MD and many others.        Follow up in 2 months to recheck aks and FBE.      Again, thank you for allowing me to participate in the care of your patient.        Sincerely,        Tamiko Hobbs PA-C

## 2019-09-12 NOTE — PROGRESS NOTES
HPI:  Janay Tam is a 39 year old female patient here today for dry patches on forehead and upper lip .  Patient states this has been present for a while.  Patient reports the following symptoms: scaly .  Patient reports the following previous treatments: ln2 to patches near lip with resolution but have since returned.  Patient reports the following modifying factors: none.  Associated symptoms: none. Also has an irritated spot on right leg for a while. No tx tried. .  Patient has no other skin complaints today.  Remainder of the HPI, Meds, PMH, Allergies, FH, and SH was reviewed in chart.    Pertinent Hx:   No personal history of skin cancer. Sister had a skin cancer.     Past Medical History:   Diagnosis Date     Allergic rhinitis, cause unspecified     Allergic rhinitis     Cytomegaloviral disease (H) 2010    Second child with hearing loss. 6/14 IgM neg, IgG pos     Depressive disorder, not elsewhere classified     use effexor in the past - more seasonal     Generalized anxiety disorder      Papanicolaou smear of cervix with atypical squamous cells of undetermined significance (ASC-US) 2001    Unsure of outcome     Postpartum depression     with first delivery, not currently medicated       Past Surgical History:   Procedure Laterality Date     C IUD,MIRENA  12/2010        Family History   Problem Relation Age of Onset     C.A.D. Father         heart problems     Cerebrovascular Disease Father      C.A.D. Maternal Grandfather         heart problems     Diabetes Maternal Grandfather      Hypertension Mother      Depression Mother      Psychotic Disorder Mother      Hyperlipidemia Mother      Thyroid Disease Mother      Cerebrovascular Disease Paternal Grandmother      Osteoporosis Paternal Grandmother      Arthritis Paternal Grandmother      Neurologic Disorder Sister         mental health     Skin Cancer Sister      Neurologic Disorder Brother         mental health     Thyroid Disease Sister         Social History     Socioeconomic History     Marital status:      Spouse name: Not on file     Number of children: 3     Years of education: Not on file     Highest education level: Not on file   Occupational History     Employer: HOMEMAKER   Social Needs     Financial resource strain: Not on file     Food insecurity:     Worry: Not on file     Inability: Not on file     Transportation needs:     Medical: Not on file     Non-medical: Not on file   Tobacco Use     Smoking status: Never Smoker     Smokeless tobacco: Never Used   Substance and Sexual Activity     Alcohol use: Yes     Alcohol/week: 0.0 oz     Comment: very rare     Drug use: No     Sexual activity: Yes     Partners: Male     Birth control/protection: Male Surgical     Comment: vasectomy   Lifestyle     Physical activity:     Days per week: Not on file     Minutes per session: Not on file     Stress: Not on file   Relationships     Social connections:     Talks on phone: Not on file     Gets together: Not on file     Attends Taoist service: Not on file     Active member of club or organization: Not on file     Attends meetings of clubs or organizations: Not on file     Relationship status: Not on file     Intimate partner violence:     Fear of current or ex partner: Not on file     Emotionally abused: Not on file     Physically abused: Not on file     Forced sexual activity: Not on file   Other Topics Concern     Parent/sibling w/ CABG, MI or angioplasty before 65F 55M? Not Asked   Social History Narrative    Social Documentation:10/21/09        Balanced Diet: YES    Calcium intake: 2 per day    Caffeine: 1-2 cup per day    Exercise:  type of activity walking, lift wts ;  7 times per week    Sunscreen: Yes     Seatbelts:  Yes    Self Breast Exam:  Yes  sometimes    Self Testicular Exam: No - n/a    Physical/Emotional/Sexual Abuse: No    Do you feel safe in your environment? Yes        Cholesterol screen up to date:  No    Eye Exam up to  date: Yes    Dental Exam up to date: Yes    Pap smear up to date: Yes    Mammogram up to date: Does Not Apply    Dexa Scan up to date: Does Not Apply    Colonoscopy up to date: Does Not Apply    Immunizations up to date: Yes td 2000    Glucose screen if over 40:   N/a        Eleanor Perdue [facility]            Outpatient Encounter Medications as of 9/12/2019   Medication Sig Dispense Refill     MULTIVITAMINS OR TABS ONE DAILY 100 1 YEAR     Omega-3 Fatty Acids (FISH OIL PO)        ranitidine (ZANTAC) 150 MG tablet Take 1 tablet (150 mg) by mouth 2 times daily 60 tablet 1     No facility-administered encounter medications on file as of 9/12/2019.        Review Of Systems:  Skin: As above  Eyes: negative  Ears/Nose/Throat: negative  Respiratory: No shortness of breath, dyspnea on exertion, cough, or hemoptysis  Cardiovascular: negative  Gastrointestinal: negative  Genitourinary: negative  Musculoskeletal: negative  Neurologic: negative  Psychiatric: negative  Hematologic/Lymphatic/Immunologic: negative  Endocrine: negative      Objective:     /60   Breastfeeding? No   Eyes: Conjunctivae/lids: Normal   ENT: Lips:  Normal  MSK: Normal  Cardiovascular: Peripheral edema none  Pulm: Breathing Normal  Neuro/Psych: Orientation: Normal; Mood/Affect: Normal, NAD, WDWN  Pt accompanied by: lina mills  Following areas examined: face, neck, right leg.   Malcolm skin type:ii   Findings:  Pink scaly macule/s x 7 on forehead and right upper cutaneous lip  Inflamed brown, stuck-on scaly appearing papules on right leg  Assessment and Plan:  1) Actinic keratoses x 7 and solar elastosis    LN2 for 5 seconds x 2. Discussed AE include hypopigmentation (white spot) and recurrence. Follow up in 2-3 months to recheck lesions. There is a risk of AKs developing into a SCC.   Treatment options include LN2 vs PDT vs Efudex. Pt elected LN2     2) inflamed verrucous sk    LN2: Treated with LN2 for 5s for 1-2 cycles. Warned risks of  blistering, pain, pigment change, scarring, and incomplete resolution.  Advised patient to return if lesions do not completely resolve within 2-3 months.  Wound care sheet given.    Signs and Symptoms of non-melanoma skin cancer and ABCDEs of melanoma reviewed with patient. Patient encouraged to perform monthly self skin exams and educated on how to perform them. UV precautions reviewed with patient. Patient was asked about new or changing moles/lesions on body.   Wear a sunscreen with at least SPF 30 on your face, ears, neck and V of the chest daily. Wear sunscreen on other areas of the body if those areas are exposed to the sun throughout the day. Sunscreens can contain physical and/or chemical blockers. Physical blockers are less likely to clog pores, these include zinc oxide and titanium dioxide. Reapply every two hour and after swimming. Sunscreen examples include Neutrogenlou CeraVe, Blue Lizard, Elta MD and many others.        Follow up in 2 months to recheck aks and FBE.

## 2019-09-19 ENCOUNTER — ANCILLARY PROCEDURE (OUTPATIENT)
Dept: MAMMOGRAPHY | Facility: CLINIC | Age: 39
End: 2019-09-19
Payer: COMMERCIAL

## 2019-09-19 DIAGNOSIS — Z12.31 VISIT FOR SCREENING MAMMOGRAM: ICD-10-CM

## 2019-09-19 PROCEDURE — 77067 SCR MAMMO BI INCL CAD: CPT | Mod: TC

## 2019-09-19 PROCEDURE — 77063 BREAST TOMOSYNTHESIS BI: CPT | Mod: TC

## 2019-10-18 ENCOUNTER — TELEPHONE (OUTPATIENT)
Dept: FAMILY MEDICINE | Facility: CLINIC | Age: 39
End: 2019-10-18

## 2019-10-18 NOTE — TELEPHONE ENCOUNTER
Last seen 5/9/18  Last saw Dr. Barragan 5/2016    Spoke with patient.  Asking if she should be seen.  Had had low grade fever, productive cough, fatigue and body aches x 6 days.  States she went to [facility] on 10/13. Tested negative for the flu.    Informed patient could be a virus that will take time go go away.  Offered to make her an appointment.  States she doesn't want to schedule now.    Advised patient to push fluids, eat healthy, get good sleep, OTC Tylenol or Ibuprofen as needed for aches  Patient verbalizes understanding and will call back if symptoms worsen and/or continue through the weekend.    Adina Tiwari RN

## 2019-10-18 NOTE — TELEPHONE ENCOUNTER
Reason for call:  Patient reporting a symptom    Symptom or request:  URI sx    Duration (how long have symptoms been present):  6 days    Have you been treated for this before? Yes    Additional comments:  Pt was seen at [facility] 10/13 for uri sx but still has sx and needs to know other treatment options . Please advise    Phone Number patient can be reached at:  Home number on file [phone] (home)    Best Time:  and    Can we leave a detailed message on this number:  YES    Call taken on 10/18/2019 at 12:29 PM by Kirill Hickman

## 2019-10-30 ENCOUNTER — TELEPHONE (OUTPATIENT)
Dept: FAMILY MEDICINE | Facility: CLINIC | Age: 39
End: 2019-10-30

## 2019-11-07 ENCOUNTER — HEALTH MAINTENANCE LETTER (OUTPATIENT)
Age: 39
End: 2019-11-07

## 2019-11-07 ENCOUNTER — OFFICE VISIT (OUTPATIENT)
Dept: FAMILY MEDICINE | Facility: CLINIC | Age: 39
End: 2019-11-07
Payer: COMMERCIAL

## 2019-11-07 VITALS — SYSTOLIC BLOOD PRESSURE: 100 MMHG | DIASTOLIC BLOOD PRESSURE: 64 MMHG

## 2019-11-07 DIAGNOSIS — D22.9 MULTIPLE NEVI: ICD-10-CM

## 2019-11-07 DIAGNOSIS — L81.0 POST-INFLAMMATORY HYPERPIGMENTATION: ICD-10-CM

## 2019-11-07 DIAGNOSIS — L57.0 ACTINIC KERATOSES: ICD-10-CM

## 2019-11-07 DIAGNOSIS — L81.4 LENTIGINES: ICD-10-CM

## 2019-11-07 DIAGNOSIS — I78.1 TELANGIECTASIAS: Primary | ICD-10-CM

## 2019-11-07 DIAGNOSIS — I83.92 VARICOSE VEINS OF LEFT LOWER EXTREMITY, UNSPECIFIED WHETHER COMPLICATED: ICD-10-CM

## 2019-11-07 DIAGNOSIS — L57.8 SOLAR ELASTOSIS: ICD-10-CM

## 2019-11-07 PROCEDURE — 99214 OFFICE O/P EST MOD 30 MIN: CPT | Performed by: PHYSICIAN ASSISTANT

## 2019-11-07 NOTE — PATIENT INSTRUCTIONS
[facility] [place]  [address], [address]  [place] [address]  Ph:[phone]  Fax: [phone]    [facility]  [address], [address]  [place], [place] [address]    May do free consolations     [facility]  [address] [place] [address]    For Free Consultations*, call [phone] toll free    Proper skin care from [facility]:    -Eliminate harsh soaps as they strip the natural oils from the skin, often resulting in dry itchy skin ( i.e. Dial, Zest, Gwen Spring)  -Use mild soaps such as Cetaphil or Dove Sensitive Skin in the shower. You do not need to use soap on arms, legs, and trunk every time you shower unless visibly soiled.   -Avoid hot or cold showers.  -After showering, lightly dry off and apply moisturizing within 2-3 minutes. This will help trap moisture in the skin.   -Aggressive use of a moisturizer at least 1-2 times a day to the entire body (including -Vanicream, Cetaphil, Aquaphor or Cerave) and moisturize hands after every washing.  -We recommend using moisturizers that come in a tub that needs to be scooped out, not a pump. This has more of an oil base. It will hold moisture in your skin much better than a water base moisturizer. The above recommended are non-pore clogging.      Wear a sunscreen with at least SPF 30 on your face, ears, neck and V of the chest daily. Wear sunscreen on other areas of the body if those areas are exposed to the sun throughout the day. Sunscreens can contain physical and/or chemical blockers. Physical blockers are less likely to clog pores, these include zinc oxide and titanium dioxide. Reapply every two hour and after swimming. Sunscreen examples include Neutrogena, CeraVe, Blue Lizard, Elta MD and many others.    UV radiation  UVA radiation remains constant throughout the day and throughout the year. It is a longer wavelength than UVB and therefore penetrates deeper into the  skin leading to immediate and delayed tanning, photoaging, and skin cancer. 70-80% of UVA and UVB radiation occurs between the hours of 10am-2pm.  UVB radiation  UVB radiation causes the most harmful effects and is more significant during the summer months. However, snow and ice can reflect UVB radiation leading to skin damage during the winter months as well. UVB radiation is responsible for tanning, burning, inflammation, delayed erythema (pinkness), pigmentation (brown spots), and skin cancer.

## 2019-11-07 NOTE — LETTER
11/7/2019         RE: Janay Tam  [address] [place] [address]        Dear Colleague,    Thank you for referring your patient, Janay Tam, to the [facility]. Please see a copy of my visit note below.    HPI:  Janay Tam is a 39 year old female patient here today for recheck aks on forehead and upper cutaneous ana luisa .  Patient states this has been present for a while.  Patient reports the following symptoms:resolution.  Patient reports the following previous treatments: ln2.  Patient reports the following modifying factors: none.  Associated symptoms: none.  Patient has no other skin complaints today.  Remainder of the HPI, Meds, PMH, Allergies, FH, and SH was reviewed in chart.    Pertinent Hx:  Personal history of actinic keratoses. No personal hx of skin cancer. Sister had a skin cancer.   Past Medical History:   Diagnosis Date     Allergic rhinitis, cause unspecified     Allergic rhinitis     Cytomegaloviral disease (H) 2010    Second child with hearing loss. 6/14 IgM neg, IgG pos     Depressive disorder, not elsewhere classified     use effexor in the past - more seasonal     Generalized anxiety disorder      Papanicolaou smear of cervix with atypical squamous cells of undetermined significance (ASC-US) 2001    Unsure of outcome     Postpartum depression     with first delivery, not currently medicated       Past Surgical History:   Procedure Laterality Date     C IUD,MIRENA  12/2010        Family History   Problem Relation Age of Onset     C.A.D. Father         heart problems     Cerebrovascular Disease Father      C.A.D. Maternal Grandfather         heart problems     Diabetes Maternal Grandfather      Hypertension Mother      Depression Mother      Psychotic Disorder Mother      Hyperlipidemia Mother      Thyroid Disease Mother      Cerebrovascular Disease Paternal Grandmother      Osteoporosis Paternal Grandmother      Arthritis Paternal Grandmother       Neurologic Disorder Sister         mental health     Skin Cancer Sister      Neurologic Disorder Brother         mental health     Thyroid Disease Sister        Social History     Socioeconomic History     Marital status:      Spouse name: Not on file     Number of children: 3     Years of education: Not on file     Highest education level: Not on file   Occupational History     Employer: [employer]OMEM[employer]ER   Social Needs     Financial resource strain: Not on file     Food insecurity:     Worry: Not on file     Inability: Not on file     Transportation needs:     Medical: Not on file     Non-medical: Not on file   Tobacco Use     Smoking status: Never Smoker     Smokeless tobacco: Never Used   Substance and Sexual Activity     Alcohol use: Yes     Alcohol/week: 0.0 standard drinks     Comment: very rare     Drug use: No     Sexual activity: Yes     Partners: Male     Birth control/protection: Male Surgical     Comment: vasectomy   Lifestyle     Physical activity:     Days per week: Not on file     Minutes per session: Not on file     Stress: Not on file   Relationships     Social connections:     Talks on phone: Not on file     Gets together: Not on file     Attends Taoism service: Not on file     Active member of club or organization: Not on file     Attends meetings of clubs or organizations: Not on file     Relationship status: Not on file     Intimate partner violence:     Fear of current or ex partner: Not on file     Emotionally abused: Not on file     Physically abused: Not on file     Forced sexual activity: Not on file   Other Topics Concern     Parent/sibling w/ CABG, MI or angioplasty before 65F 55M? Not Asked   Social History Narrative    Social Documentation:10/21/09        Balanced Diet: YES    Calcium intake: 2 per day    Caffeine: 1-2 cup per day    Exercise:  type of activity walking, lift wts ;  7 times per week    Sunscreen: Yes     Seatbelts:  Yes    Self Breast Exam:  Yes  sometimes    Self  Testicular Exam: No - n/a    Physical/Emotional/Sexual Abuse: No    Do you feel safe in your environment? Yes        Cholesterol screen up to date:  No    Eye Exam up to date: Yes    Dental Exam up to date: Yes    Pap smear up to date: Yes    Mammogram up to date: Does Not Apply    Dexa Scan up to date: Does Not Apply    Colonoscopy up to date: Does Not Apply    Immunizations up to date: Yes td 2000    Glucose screen if over 40:   N/a        Eleanor Perdue [facility]            Outpatient Encounter Medications as of 11/7/2019   Medication Sig Dispense Refill     MULTIVITAMINS OR TABS ONE DAILY 100 1 YEAR     Omega-3 Fatty Acids (FISH OIL PO)        ranitidine (ZANTAC) 150 MG tablet Take 1 tablet (150 mg) by mouth 2 times daily 60 tablet 1     No facility-administered encounter medications on file as of 11/7/2019.        Review Of Systems:  Skin: As above  Eyes: negative  Ears/Nose/Throat: negative  Respiratory: No shortness of breath, dyspnea on exertion, cough, or hemoptysis  Cardiovascular: negative  Gastrointestinal: negative  Genitourinary: negative  Musculoskeletal: negative  Neurologic: negative  Psychiatric: negative  Hematologic/Lymphatic/Immunologic: negative  Endocrine: negative      Objective:     There were no vitals taken for this visit.  Eyes: Conjunctivae/lids: Normal   ENT: Lips:  Normal  MSK: Normal  Cardiovascular: Peripheral edema none  Pulm: Breathing Normal  Neuro/Psych: Orientation: Normal; Mood/Affect: Normal, NAD, WDWN  Pt accompanied by: self  Following areas examined: Scalp, face, eyelids, lips, neck, chest, abdomen, back, buttocks, and R&L upper and lower extremities. Pt defers exam of groin and genitals.   Malcolm skin type:ii   Findings:  Red smooth well-defined macules on trunk and extremities.  Brown, stuck-on scaly appearing papules on trunk and extremities.  Well circumscribed macules with symmetric color distribution on trunk and extremities.  Tan WD smooth macules on face, neck,  trunk, and extremities.  Pink smooth macules on forehead and cutaneous upper lip  Blue tortuous plaques on left LE  Assessment and Plan:     1)telangiectasias, Benign nevi, Lentigines     I discussed the specifics of tumor, prognosis, and genetics of benign lesions.  I explained that treatment of these lesions would be purely cosmetic and not medically neccessary.  I discussed with patient different removal options including excision, cryotherapy, cautery and /or laser.  Lesion may recur and/or may not completely resolve. May need additional treatment.     2) PIH, Actinic keratoses and solar elastosis  aks resolved  Watch and monitor    3) varicose veins  Disc following up with a vein clinic  [facility]  Address: [address], [place] [address]   Phone: [phone]    Signs and Symptoms of non-melanoma skin cancer and ABCDEs of melanoma reviewed with patient. Patient encouraged to perform monthly self skin exams and educated on how to perform them. UV precautions reviewed with patient. Patient was asked about new or changing moles/lesions on body.   Wear a sunscreen with at least SPF 30 on your face, ears, neck and V of the chest daily. Wear sunscreen on other areas of the body if those areas are exposed to the sun throughout the day. Sunscreens can contain physical and/or chemical blockers. Physical blockers are less likely to clog pores, these include zinc oxide and titanium dioxide. Reapply every two hour and after swimming. Sunscreen examples include Neutrogena, CeraVe, Blue Lizard, Elta MD and many others.    Proper skin care from [facility]:    -Eliminate harsh soaps as they strip the natural oils from the skin, often resulting in dry itchy skin ( i.e. Dial, Zest, Gwen Spring)  -Use mild soaps such as Cetaphil or Dove Sensitive Skin in the shower. You do not need to use soap on arms, legs, and trunk every time you shower unless visibly soiled.   -Avoid hot or cold showers.  -After  showering, lightly dry off and apply moisturizing within 2-3 minutes. This will help trap moisture in the skin.   -Aggressive use of a moisturizer at least 1-2 times a day to the entire body (including -Vanicream, Cetaphil, Aquaphor or Cerave) and moisturize hands after every washing.  -We recommend using moisturizers that come in a tub that needs to be scooped out, not a pump. This has more of an oil base. It will hold moisture in your skin much better than a water base moisturizer. The above recommended are non-pore clogging.               Follow up in yearly FBE      Again, thank you for allowing me to participate in the care of your patient.        Sincerely,        Tamiko Hobbs PA-C

## 2019-11-07 NOTE — PROGRESS NOTES
HPI:  Janay Tam is a 39 year old female patient here today for recheck aks on forehead and upper cutaneous ana luisa .  Patient states this has been present for a while.  Patient reports the following symptoms:resolution.  Patient reports the following previous treatments: ln2.  Patient reports the following modifying factors: none.  Associated symptoms: none.  Patient has no other skin complaints today.  Remainder of the HPI, Meds, PMH, Allergies, FH, and SH was reviewed in chart.    Pertinent Hx:  Personal history of actinic keratoses. No personal hx of skin cancer. Sister had a skin cancer.   Past Medical History:   Diagnosis Date     Allergic rhinitis, cause unspecified     Allergic rhinitis     Cytomegaloviral disease (H) 2010    Second child with hearing loss. 6/14 IgM neg, IgG pos     Depressive disorder, not elsewhere classified     use effexor in the past - more seasonal     Generalized anxiety disorder      Papanicolaou smear of cervix with atypical squamous cells of undetermined significance (ASC-US) 2001    Unsure of outcome     Postpartum depression     with first delivery, not currently medicated       Past Surgical History:   Procedure Laterality Date     C IUD,MIRENA  12/2010        Family History   Problem Relation Age of Onset     C.A.D. Father         heart problems     Cerebrovascular Disease Father      C.A.D. Maternal Grandfather         heart problems     Diabetes Maternal Grandfather      Hypertension Mother      Depression Mother      Psychotic Disorder Mother      Hyperlipidemia Mother      Thyroid Disease Mother      Cerebrovascular Disease Paternal Grandmother      Osteoporosis Paternal Grandmother      Arthritis Paternal Grandmother      Neurologic Disorder Sister         mental health     Skin Cancer Sister      Neurologic Disorder Brother         mental health     Thyroid Disease Sister        Social History     Socioeconomic History     Marital status:      Spouse name:  Not on file     Number of children: 3     Years of education: Not on file     Highest education level: Not on file   Occupational History     Employer: [employer]OMEM[employer]ER   Social Needs     Financial resource strain: Not on file     Food insecurity:     Worry: Not on file     Inability: Not on file     Transportation needs:     Medical: Not on file     Non-medical: Not on file   Tobacco Use     Smoking status: Never Smoker     Smokeless tobacco: Never Used   Substance and Sexual Activity     Alcohol use: Yes     Alcohol/week: 0.0 standard drinks     Comment: very rare     Drug use: No     Sexual activity: Yes     Partners: Male     Birth control/protection: Male Surgical     Comment: vasectomy   Lifestyle     Physical activity:     Days per week: Not on file     Minutes per session: Not on file     Stress: Not on file   Relationships     Social connections:     Talks on phone: Not on file     Gets together: Not on file     Attends Rastafari service: Not on file     Active member of club or organization: Not on file     Attends meetings of clubs or organizations: Not on file     Relationship status: Not on file     Intimate partner violence:     Fear of current or ex partner: Not on file     Emotionally abused: Not on file     Physically abused: Not on file     Forced sexual activity: Not on file   Other Topics Concern     Parent/sibling w/ CABG, MI or angioplasty before 65F 55M? Not Asked   Social History Narrative    Social Documentation:10/21/09        Balanced Diet: YES    Calcium intake: 2 per day    Caffeine: 1-2 cup per day    Exercise:  type of activity walking, lift wts ;  7 times per week    Sunscreen: Yes     Seatbelts:  Yes    Self Breast Exam:  Yes  sometimes    Self Testicular Exam: No - n/a    Physical/Emotional/Sexual Abuse: No    Do you feel safe in your environment? Yes        Cholesterol screen up to date:  No    Eye Exam up to date: Yes    Dental Exam up to date: Yes    Pap smear up to date: Yes     Mammogram up to date: Does Not Apply    Dexa Scan up to date: Does Not Apply    Colonoscopy up to date: Does Not Apply    Immunizations up to date: Yes td 2000    Glucose screen if over 40:   N/a        Eleanor Perdue [facility]            Outpatient Encounter Medications as of 11/7/2019   Medication Sig Dispense Refill     MULTIVITAMINS OR TABS ONE DAILY 100 1 YEAR     Omega-3 Fatty Acids (FISH OIL PO)        ranitidine (ZANTAC) 150 MG tablet Take 1 tablet (150 mg) by mouth 2 times daily 60 tablet 1     No facility-administered encounter medications on file as of 11/7/2019.        Review Of Systems:  Skin: As above  Eyes: negative  Ears/Nose/Throat: negative  Respiratory: No shortness of breath, dyspnea on exertion, cough, or hemoptysis  Cardiovascular: negative  Gastrointestinal: negative  Genitourinary: negative  Musculoskeletal: negative  Neurologic: negative  Psychiatric: negative  Hematologic/Lymphatic/Immunologic: negative  Endocrine: negative      Objective:     There were no vitals taken for this visit.  Eyes: Conjunctivae/lids: Normal   ENT: Lips:  Normal  MSK: Normal  Cardiovascular: Peripheral edema none  Pulm: Breathing Normal  Neuro/Psych: Orientation: Normal; Mood/Affect: Normal, NAD, WDWN  Pt accompanied by: self  Following areas examined: Scalp, face, eyelids, lips, neck, chest, abdomen, back, buttocks, and R&L upper and lower extremities. Pt defers exam of groin and genitals.   Malcolm skin type:ii   Findings:  Red smooth well-defined macules on trunk and extremities.  Brown, stuck-on scaly appearing papules on trunk and extremities.  Well circumscribed macules with symmetric color distribution on trunk and extremities.  Tan WD smooth macules on face, neck, trunk, and extremities.  Pink smooth macules on forehead and cutaneous upper lip  Blue tortuous plaques on left LE  Assessment and Plan:     1)telangiectasias, Benign nevi, Lentigines     I discussed the specifics of tumor, prognosis, and genetics  of benign lesions.  I explained that treatment of these lesions would be purely cosmetic and not medically neccessary.  I discussed with patient different removal options including excision, cryotherapy, cautery and /or laser.  Lesion may recur and/or may not completely resolve. May need additional treatment.     2) PIH, Actinic keratoses and solar elastosis  aks resolved  Watch and monitor    3) varicose veins  Disc following up with a vein clinic  [facility]  Address: [address], [place] [address]   Phone: [phone]    Signs and Symptoms of non-melanoma skin cancer and ABCDEs of melanoma reviewed with patient. Patient encouraged to perform monthly self skin exams and educated on how to perform them. UV precautions reviewed with patient. Patient was asked about new or changing moles/lesions on body.   Wear a sunscreen with at least SPF 30 on your face, ears, neck and V of the chest daily. Wear sunscreen on other areas of the body if those areas are exposed to the sun throughout the day. Sunscreens can contain physical and/or chemical blockers. Physical blockers are less likely to clog pores, these include zinc oxide and titanium dioxide. Reapply every two hour and after swimming. Sunscreen examples include Neutrogena, CeraVe, Blue Lizard, Elta MD and many others.    Proper skin care from [facility]:    -Eliminate harsh soaps as they strip the natural oils from the skin, often resulting in dry itchy skin ( i.e. Dial, Zest, Gwen Spring)  -Use mild soaps such as Cetaphil or Dove Sensitive Skin in the shower. You do not need to use soap on arms, legs, and trunk every time you shower unless visibly soiled.   -Avoid hot or cold showers.  -After showering, lightly dry off and apply moisturizing within 2-3 minutes. This will help trap moisture in the skin.   -Aggressive use of a moisturizer at least 1-2 times a day to the entire body (including -Vanicream, Cetaphil, Aquaphor or Cerave) and  moisturize hands after every washing.  -We recommend using moisturizers that come in a tub that needs to be scooped out, not a pump. This has more of an oil base. It will hold moisture in your skin much better than a water base moisturizer. The above recommended are non-pore clogging.               Follow up in yearly FBE

## 2019-12-26 ENCOUNTER — TRANSFERRED RECORDS (OUTPATIENT)
Dept: HEALTH INFORMATION MANAGEMENT | Facility: CLINIC | Age: 39
End: 2019-12-26

## 2020-02-07 ENCOUNTER — OFFICE VISIT (OUTPATIENT)
Dept: DERMATOLOGY | Facility: CLINIC | Age: 40
End: 2020-02-07
Payer: COMMERCIAL

## 2020-02-07 VITALS — DIASTOLIC BLOOD PRESSURE: 75 MMHG | OXYGEN SATURATION: 98 % | SYSTOLIC BLOOD PRESSURE: 127 MMHG | HEART RATE: 75 BPM

## 2020-02-07 DIAGNOSIS — L82.1 SEBORRHEIC KERATOSIS: ICD-10-CM

## 2020-02-07 DIAGNOSIS — D48.5 NEOPLASM OF UNCERTAIN BEHAVIOR OF SKIN: Primary | ICD-10-CM

## 2020-02-07 PROCEDURE — 11102 TANGNTL BX SKIN SINGLE LES: CPT | Performed by: PHYSICIAN ASSISTANT

## 2020-02-07 PROCEDURE — 99213 OFFICE O/P EST LOW 20 MIN: CPT | Mod: 25 | Performed by: PHYSICIAN ASSISTANT

## 2020-02-07 PROCEDURE — 88305 TISSUE EXAM BY PATHOLOGIST: CPT | Mod: TC | Performed by: PHYSICIAN ASSISTANT

## 2020-02-07 NOTE — PROGRESS NOTES
HPI:   Chief complaints: Janay Tam is a 39 year old female who presents for evaluation of spot on forehead. Noticed area is tender today. Had area treated with LN2 previously but spot came back.   Condition present for:  Several months .   Previous treatments include: LN2 x 1    Additional concern: spot on right ankle    Review Of Systems  Eyes: negative  Ears/Nose/Throat: negative  Respiratory: No shortness of breath, dyspnea on exertion, cough, or hemoptysis  Cardiovascular: negative  Gastrointestinal: negative  Genitourinary: negative  Musculoskeletal: negative  Neurologic: negative  Psychiatric: negative  Skin: positive for lesion    This document serves as a record of the services and decisions personally performed and made by Sylvie Hauser, MS, PA-C. It was created on her behalf by Gogo Richter, a trained medical scribe. The creation of this document is based on the provider's statements to the medical scribe.  Gogo Richter 2:11 PM February 7, 2020    PHYSICAL EXAM:    /75   Pulse 75   LMP 02/02/2020   SpO2 98%   Breastfeeding No   Skin exam performed as follows: Type 2 skin. Mood appropriate  Alert and Oriented X 3. Well developed, well nourished in no distress.  General appearance: Normal  Head including face: Normal  Eyes: conjunctiva and lids: Normal  Mouth: Lips, teeth, gums: Normal  Neck: Normal  Chest-breast/axillae: Normal  Back: Normal  Spleen and liver: Normal  Cardiovascular: Exam of peripheral vascular system by observation for swelling, varicosities, edema: Normal  Genitalia: groin, buttocks: Normal  Extremities: digits/nails (clubbing): Normal  Eccrine and Apocrine glands: Normal  Right upper extremity: Normal  Left upper extremity: Normal  Right lower extremity: Normal  Left lower extremity: Normal  Skin: Scalp and body hair: See below    1. Pink gritty papule on left medial forehead    ASSESSMENT/PLAN:     1. R/o AK vs SCC on the left medial  forehead. Photo taken and placed in chart. Shave biopsy performed.  Area cleaned and anesthetized with 1% lidocaine with epinephrine.  Dermablade used to remove the lesion and sent to pathology. Bleeding was cauterized. Pt tolerated procedure well with no complications.    2. Benign keratosis on the right medial ankle no treatment needed  3. Janay to follow up with Primary Care provider regarding elevated blood pressure.        Follow-up: pending path  CC:   Scribed By: Gogo Richter, Medical Scribe    The information in this document, created by the medical scribe for me, accurately reflects the services I personally performed and the decisions made by me. I have reviewed and approved this document for accuracy prior to leaving the patient care area.  February 7, 2020 2:16 PM    Sylvie Hauser MS, PA-C

## 2020-02-07 NOTE — PATIENT INSTRUCTIONS
Wound Care Instructions     FOR SUPERFICIAL WOUNDS     [facility] [phone]                 AFTER 24 HOURS YOU SHOULD REMOVE THE BANDAGE AND BEGIN DAILY DRESSING CHANGES AS FOLLOWS:     1) Remove Dressing.     2) Clean and dry the area with tap water using a Q-tip or sterile gauze pad.     3) Apply Vaseline, Aquaphor, Polysporin ointment or Bacitracin ointment over entire wound.  Do NOT use Neosporin ointment.     4) Cover the wound with a band-aid, or a sterile non-stick gauze pad and micropore paper tape    REPEAT THESE INSTRUCTIONS AT LEAST ONCE A DAY UNTIL THE WOUND HAS COMPLETELY HEALED.    It is an old wives tale that a wound heals better when it is exposed to air and allowed to dry out. The wound will heal faster with a better cosmetic result if it is kept moist with ointment and covered with a bandage.    **Do not let the wound dry out.**    Supplies Needed:      *Cotton tipped applicators (Q-tips)    *Vaseline, Aquaphor, Polysporin or Bacitracin Ointment (NOT NEOSPORIN)    *Band-aids or non-stick gauze pads and micropore paper tape.      PATIENT INFORMATION:    During the healing process you will notice a number of changes. All wounds develop a small halo of redness surrounding the wound.  This means healing is occurring. Severe itching with extensive redness usually indicates sensitivity to the ointment or bandage tape used to dress the wound.  You should call our office if this develops.      Swelling  and/or discoloration around your surgical site is common, particularly when performed around the eye.    All wounds normally drain.  The larger the wound the more drainage there will be.  After 7-10 days, you will notice the wound beginning to shrink and new skin will begin to grow.  The wound is healed when you can see skin has formed over the entire area.  A healed wound has a healthy, shiny look to the surface and is red to dark pink in color to normalize.  Wounds may take approximately  4-6 weeks to heal.  Larger wounds may take 6-8 weeks.  After the wound is healed you may discontinue dressing changes.    You may experience a sensation of tightness as your wound heals. This is normal and will gradually subside.    Your healed wound may be sensitive to temperature changes. This sensitivity improves with time, but if you re having a lot of discomfort, try to avoid temperature extremes.    Patients frequently experience itching after their wound appears to have healed because of the continue healing under the skin.  Plain Vaseline will help relieve the itching.      POSSIBLE COMPLICATIONS    BLEEDIN. Leave the bandage in place.  2. Use tightly rolled up gauze or a cloth to apply direct pressure over the bandage for 30  minutes.  3. Reapply pressure for an additional 30 minutes if necessary  4. Use additional gauze and tape to maintain pressure once the bleeding has stopped.

## 2020-02-07 NOTE — LETTER
2/7/2020         RE: Janay Tam  [address] [place] [address]        Dear Colleague,    Thank you for referring your patient, Janay Tam, to the [facility]. Please see a copy of my visit note below.    HPI:   Chief complaints: Janay Tam is a 39 year old female who presents for evaluation of spot on forehead. Noticed area is tender today. Had area treated with LN2 previously but spot came back.   Condition present for:  Several months .   Previous treatments include: LN2 x 1    Additional concern: spot on right ankle    Review Of Systems  Eyes: negative  Ears/Nose/Throat: negative  Respiratory: No shortness of breath, dyspnea on exertion, cough, or hemoptysis  Cardiovascular: negative  Gastrointestinal: negative  Genitourinary: negative  Musculoskeletal: negative  Neurologic: negative  Psychiatric: negative  Skin: positive for lesion    This document serves as a record of the services and decisions personally performed and made by Sylvie Hauser, MS, PA-C. It was created on her behalf by Gogo Richter, a trained medical scribe. The creation of this document is based on the provider's statements to the medical scribe.  Gogo Richter 2:11 PM February 7, 2020    PHYSICAL EXAM:    /75   Pulse 75   LMP 02/02/2020   SpO2 98%   Breastfeeding No   Skin exam performed as follows: Type 2 skin. Mood appropriate  Alert and Oriented X 3. Well developed, well nourished in no distress.  General appearance: Normal  Head including face: Normal  Eyes: conjunctiva and lids: Normal  Mouth: Lips, teeth, gums: Normal  Neck: Normal  Chest-breast/axillae: Normal  Back: Normal  Spleen and liver: Normal  Cardiovascular: Exam of peripheral vascular system by observation for swelling, varicosities, edema: Normal  Genitalia: groin, buttocks: Normal  Extremities: digits/nails (clubbing): Normal  Eccrine and Apocrine glands: Normal  Right upper  extremity: Normal  Left upper extremity: Normal  Right lower extremity: Normal  Left lower extremity: Normal  Skin: Scalp and body hair: See below    1. Pink gritty papule on left medial forehead    ASSESSMENT/PLAN:     1. R/o AK vs SCC on the left medial forehead. Photo taken and placed in chart. Shave biopsy performed.  Area cleaned and anesthetized with 1% lidocaine with epinephrine.  Dermablade used to remove the lesion and sent to pathology. Bleeding was cauterized. Pt tolerated procedure well with no complications.    2. Benign keratosis on the right medial ankle no treatment needed  3. Janay to follow up with Primary Care provider regarding elevated blood pressure.        Follow-up: pending path  CC:   Scribed By: Gogo Richter, Medical Scribe    The information in this document, created by the medical scribe for me, accurately reflects the services I personally performed and the decisions made by me. I have reviewed and approved this document for accuracy prior to leaving the patient care area.  February 7, 2020 2:16 PM    Sylvie Hauser MS, PA-C        Again, thank you for allowing me to participate in the care of your patient.        Sincerely,        Sylvie Hauser PA-C

## 2020-02-11 LAB — COPATH REPORT: NORMAL

## 2020-05-28 ENCOUNTER — VIRTUAL VISIT (OUTPATIENT)
Dept: OBGYN | Facility: CLINIC | Age: 40
End: 2020-05-28
Payer: COMMERCIAL

## 2020-05-28 VITALS — BODY MASS INDEX: 24.24 KG/M2 | HEIGHT: 64 IN | WEIGHT: 142 LBS

## 2020-05-28 DIAGNOSIS — N39.3 STRESS INCONTINENCE IN FEMALE: ICD-10-CM

## 2020-05-28 DIAGNOSIS — F41.9 ANXIETY: Primary | ICD-10-CM

## 2020-05-28 PROCEDURE — 99213 OFFICE O/P EST LOW 20 MIN: CPT | Mod: 95 | Performed by: OBSTETRICS & GYNECOLOGY

## 2020-05-28 ASSESSMENT — MIFFLIN-ST. JEOR: SCORE: 1291.17

## 2020-05-28 NOTE — PROGRESS NOTES
"Janay Tam is a 39 year old female who is being evaluated via a billable video visit.      The patient has been notified of following:     \"This video visit will be conducted via a call between you and your physician/provider. We have found that certain health care needs can be provided without the need for an in-person physical exam.  This service lets us provide the care you need with a video conversation.  If a prescription is necessary we can send it directly to your pharmacy.  If lab work is needed we can place an order for that and you can then stop by our lab to have the test done at a later time.    Video visits are billed at different rates depending on your insurance coverage.  Please reach out to your insurance provider with any questions.    If during the course of the call the physician/provider feels a video visit is not appropriate, you will not be charged for this service.\"    Patient has given verbal consent for Video visit? Yes    How would you like to obtain your AVS? MyChart    Patient would like the video invitation sent by: Text to cell phone: [phone]    Will anyone else be joining your video visit? No        Video-Visit Details    Type of service:  Video Visit    Video Start Time: 1:30  Video End Time: 1:50    Originating Location (pt. Location): Home    Distant Location (provider location):  [facility]     Platform used for Video Visit: Aleksandra Pozo MD        SUBJECTIVE:                                                   Janay Tam is a 39 year old female who presents to clinic today for the following health issue(s):  Patient presents with:  Anxiety: Patient has been on anxiety med in the past, would like to discuss.   Incontinence: has more stress incontinence and would like to discuss     Additional information: would like to talk about thyroid testing due to mother's history    HPI:  Having issues with USI during running, sneezing " coughing. Isn't wearing a pad but should. Urine running down leg with running. Running is her way to deal with stress. Has added stressors with pandemic. No other urinary issues. No urgency.   Has some hot flashes at night too. Strong FH of thyroid disease.  Pt has hx of Effexor usage. She is functioning but just having more anxiety than usual.     Patient's last menstrual period was 2020..   Patient is sexually active, .  Using vasectomy for contraception.    reports that she has never smoked. She has never used smokeless tobacco.  Health maintenance updated:  Due for co-test pap    PHQ-9 SCORE 2019   PHQ-9 Total Score     PHQ-9 Total Score MyChart  4 (Minimal depression)   PHQ-9 Total Score 1 4   MILA-7 SCORE 2019   Total Score  10 (moderate anxiety)   Total Score 5 10       Problem list and histories reviewed & adjusted, as indicated.  Additional history: as documented.    Patient Active Problem List   Diagnosis     Allergic rhinitis     CARDIOVASCULAR SCREENING; LDL GOAL LESS THAN 160     Anxiety     Gastroesophageal reflux disease without esophagitis     Past Surgical History:   Procedure Laterality Date     C IUD,MIRENA  2010      Social History     Tobacco Use     Smoking status: Never Smoker     Smokeless tobacco: Never Used   Substance Use Topics     Alcohol use: Yes     Alcohol/week: 0.0 standard drinks     Comment: very rare      Problem (# of Occurrences) Relation (Name,Age of Onset)    Arthritis (1) Paternal Grandmother    C.A.D. (2) Father: heart problems, Maternal Grandfather: heart problems    Cerebrovascular Disease (2) Father, Paternal Grandmother    Depression (1) Mother    Diabetes (1) Maternal Grandfather    Hyperlipidemia (1) Mother    Hypertension (1) Mother    Neurologic Disorder (2) Sister: mental health, Brother: mental health    Osteoporosis (1) Paternal Grandmother    Psychotic Disorder (1) Mother    Skin Cancer (1) Sister    Thyroid Disease (2)  "Mother, Sister            Current Outpatient Medications   Medication Sig     MULTIVITAMINS OR TABS ONE DAILY     Omega-3 Fatty Acids (FISH OIL PO)      ranitidine (ZANTAC) 150 MG tablet Take 1 tablet (150 mg) by mouth 2 times daily     No current facility-administered medications for this visit.      Allergies   Allergen Reactions     Penicillins        ROS:  12 point review of systems negative other than symptoms noted below or in the HPI.  Genitourinary: Incontinence  Psychiatric: Anxiety        OBJECTIVE:     Ht 1.613 m (5' 3.5\")   Wt 64.4 kg (142 lb)   LMP 05/17/2020   BMI 24.76 kg/m    Body mass index is 24.76 kg/m .    Exam:  GENERAL: Healthy, alert and no distress  EYES: Eyes grossly normal to inspection.  No discharge or erythema, or obvious scleral/conjunctival abnormalities.  RESP: No audible wheeze, cough, or visible cyanosis.  No visible retractions or increased work of breathing.    SKIN: Visible skin clear. No significant rash, abnormal pigmentation or lesions.  NEURO: Cranial nerves grossly intact.  Mentation and speech appropriate for age.  PSYCH: Mentation appears normal, affect normal/bright, judgement and insight intact, normal speech and appearance well-groomed.      In-Clinic Test Results:  No results found for this or any previous visit (from the past 24 hour(s)).    ASSESSMENT/PLAN:                                                        ICD-10-CM    1. Anxiety  F41.9 TSH with free T4 reflex     TSH with free T4 reflex   2. Stress incontinence in female  N39.3        Long discussion about urinary incontinence and it's causes. Explained difference between stress incontinence and urge incontinence. Discussed etiology of both and their treatments.  Reviewed treatments for USI. Discussed classic Perez or MMK procedures and evolution of slings over the years. Discussed success rates of 85% over 5 years for these procedures and our anecdotal success since 2007 with MiniArc.  Discussed FDA " advisory concerning mesh and vaginal procedures and the response by the  AUA. Slings are the gold standard treatment for USI. Reviewed the updated advisory 5 years later placing slings in a low risk category and mesh for pelvic organ prolapse in a moderate to high risk.   Explained lawsuits surrounding mesh and the reasons for it.   Reviewed 1% chance of mesh exposure and possible need for second procedure to repair the exposure. Very low risk of pelvic pain.   Explained procedure in detail and use of local anesthetic.   Reviewed post op recovery and expected time to recovery. Avoid intercourse for 4 weeks.   Discussed Urodynamic testing and the information it gives me. Explained ISD and it's treatments.  Also discussed trying Poise insert with running to see if helps.   Can RTC for thyroid testing due to FH and symptoms.         Sanjeev Pozo MD  [facility] [place]

## 2020-06-02 DIAGNOSIS — F41.9 ANXIETY: ICD-10-CM

## 2020-06-02 PROCEDURE — 36415 COLL VENOUS BLD VENIPUNCTURE: CPT | Performed by: OBSTETRICS & GYNECOLOGY

## 2020-06-02 PROCEDURE — 84443 ASSAY THYROID STIM HORMONE: CPT | Performed by: OBSTETRICS & GYNECOLOGY

## 2020-06-03 LAB — TSH SERPL DL<=0.005 MIU/L-ACNC: 1.01 MU/L (ref 0.4–4)

## 2020-07-09 DIAGNOSIS — N39.3 STRESS INCONTINENCE IN FEMALE: Primary | ICD-10-CM

## 2020-07-14 DIAGNOSIS — N39.3 STRESS INCONTINENCE IN FEMALE: ICD-10-CM

## 2020-07-14 LAB
ALBUMIN UR-MCNC: NEGATIVE MG/DL
APPEARANCE UR: CLEAR
BILIRUB UR QL STRIP: NEGATIVE
COLOR UR AUTO: YELLOW
GLUCOSE UR STRIP-MCNC: NEGATIVE MG/DL
HGB UR QL STRIP: ABNORMAL
KETONES UR STRIP-MCNC: NEGATIVE MG/DL
LEUKOCYTE ESTERASE UR QL STRIP: NEGATIVE
NITRATE UR QL: NEGATIVE
PH UR STRIP: 6 PH (ref 5–7)
RBC #/AREA URNS AUTO: ABNORMAL /HPF
SOURCE: ABNORMAL
SP GR UR STRIP: 1.01 (ref 1–1.03)
UROBILINOGEN UR STRIP-ACNC: 0.2 EU/DL (ref 0.2–1)
WBC #/AREA URNS AUTO: ABNORMAL /HPF

## 2020-07-14 PROCEDURE — 81001 URINALYSIS AUTO W/SCOPE: CPT | Performed by: OBSTETRICS & GYNECOLOGY

## 2020-07-21 ENCOUNTER — ALLIED HEALTH/NURSE VISIT (OUTPATIENT)
Dept: OBGYN | Facility: CLINIC | Age: 40
End: 2020-07-21
Payer: COMMERCIAL

## 2020-07-21 ENCOUNTER — TELEPHONE (OUTPATIENT)
Dept: OBGYN | Facility: CLINIC | Age: 40
End: 2020-07-21

## 2020-07-21 ENCOUNTER — TRANSFERRED RECORDS (OUTPATIENT)
Dept: HEALTH INFORMATION MANAGEMENT | Facility: CLINIC | Age: 40
End: 2020-07-21

## 2020-07-21 DIAGNOSIS — Z11.59 ENCOUNTER FOR SCREENING FOR OTHER VIRAL DISEASES: Primary | ICD-10-CM

## 2020-07-21 DIAGNOSIS — N39.3 STRESS INCONTINENCE IN FEMALE: Primary | ICD-10-CM

## 2020-07-21 PROCEDURE — 51741 ELECTRO-UROFLOWMETRY FIRST: CPT | Performed by: OBSTETRICS & GYNECOLOGY

## 2020-07-21 PROCEDURE — 51729 CYSTOMETROGRAM W/VP&UP: CPT | Performed by: OBSTETRICS & GYNECOLOGY

## 2020-07-21 PROCEDURE — 51797 INTRAABDOMINAL PRESSURE TEST: CPT | Performed by: OBSTETRICS & GYNECOLOGY

## 2020-07-21 RX ORDER — HYDROCODONE BITARTRATE AND ACETAMINOPHEN 5; 325 MG/1; MG/1
TABLET ORAL
Qty: 3 TABLET | Refills: 0 | Status: SHIPPED | OUTPATIENT
Start: 2020-07-21 | End: 2020-08-11

## 2020-07-21 RX ORDER — CIPROFLOXACIN 750 MG/1
TABLET, FILM COATED ORAL
Qty: 1 TABLET | Refills: 0 | Status: SHIPPED | OUTPATIENT
Start: 2020-07-21 | End: 2020-08-11

## 2020-07-21 RX ORDER — ALPRAZOLAM 0.5 MG
TABLET ORAL
Qty: 1 TABLET | Refills: 0 | Status: SHIPPED | OUTPATIENT
Start: 2020-07-21 | End: 2020-11-19

## 2020-07-21 NOTE — TELEPHONE ENCOUNTER
Associated Diagnoses     Stress incontinence in female [N39.3]  - Primary        Source Order Set     Order Set Name  Order ID     [id number]    Case Request: Case Info     Panel 1 (Provider: Sanjeev Pozo MD)     Procedures  Laterality  Anesthesia  Region    MIDURETHRAL SLING WITH DIAGNOSTIC CYSTOSCOPY  N/A  Local        Requested date:     Location:  OR    Patient class: Same Day Surgery       Pre-op diagnoses:  Stress incontinence in female    Scheduling Instructions     Please schedule for surgery:     Patient Name:  Janay Tam ([id number]).   :  1980       Physician requests assist from:  None   Requested Dates:     Schedule based on:     Amount of time needed for the procedure:     Expected time off from work:     Surgeon:  Sanjeev Pozo MD   Surgery permit to read:  Midurethral sling with diagnostic cystoscopy   Preop Needed:  No   Location for surgery to performed:   Surgery Outpatient   Anesthesia:  Local     -- Penicillins   Nickel allergy:     EMB:     DIAGNOSIS:  USI     Special instructions:     Vendor Rep:     Meds Needed: Given

## 2020-07-21 NOTE — TELEPHONE ENCOUNTER
Type of surgery: MIDURETHRAL SLING WITH DIAGNOSTIC CYSTOSCOPY   Location of surgery: [facility]  Date and time of surgery: 8/5/20 9:50-10:50  Surgeon: Dr. Pzoo  Pre-Op Appt Date:   Post-Op Appt Date:    Packet sent out: handed at time of scheduling  Pre-cert/Authorization completed:    Date: 7/21/21 Nancy    Patient aware of covid testing

## 2020-07-21 NOTE — PROGRESS NOTES
Pt had urodynamics today  Has normal UPP  Can handle 300cc urine  No leaking with cough and catheter in place.   Pt wants to proceed with sling.   Preop Rxs written

## 2020-08-02 DIAGNOSIS — Z11.59 ENCOUNTER FOR SCREENING FOR OTHER VIRAL DISEASES: ICD-10-CM

## 2020-08-02 PROCEDURE — U0003 INFECTIOUS AGENT DETECTION BY NUCLEIC ACID (DNA OR RNA); SEVERE ACUTE RESPIRATORY SYNDROME CORONAVIRUS 2 (SARS-COV-2) (CORONAVIRUS DISEASE [COVID-19]), AMPLIFIED PROBE TECHNIQUE, MAKING USE OF HIGH THROUGHPUT TECHNOLOGIES AS DESCRIBED BY CMS-2020-01-R: HCPCS | Performed by: OBSTETRICS & GYNECOLOGY

## 2020-08-03 ENCOUNTER — MYC MEDICAL ADVICE (OUTPATIENT)
Dept: OBGYN | Facility: CLINIC | Age: 40
End: 2020-08-03

## 2020-08-03 LAB
LABORATORY COMMENT REPORT: NORMAL
SARS-COV-2 RNA SPEC QL NAA+PROBE: NEGATIVE
SARS-COV-2 RNA SPEC QL NAA+PROBE: NORMAL
SPECIMEN SOURCE: NORMAL
SPECIMEN SOURCE: NORMAL

## 2020-08-05 ENCOUNTER — SURGERY (OUTPATIENT)
Age: 40
End: 2020-08-05
Payer: COMMERCIAL

## 2020-08-05 ENCOUNTER — HOSPITAL ENCOUNTER (OUTPATIENT)
Facility: CLINIC | Age: 40
Discharge: HOME | End: 2020-08-05
Attending: OBSTETRICS & GYNECOLOGY | Admitting: OBSTETRICS & GYNECOLOGY
Payer: COMMERCIAL

## 2020-08-05 VITALS
BODY MASS INDEX: 24.77 KG/M2 | RESPIRATION RATE: 16 BRPM | DIASTOLIC BLOOD PRESSURE: 81 MMHG | WEIGHT: 145.1 LBS | SYSTOLIC BLOOD PRESSURE: 112 MMHG | HEART RATE: 58 BPM | OXYGEN SATURATION: 100 % | HEIGHT: 64 IN | TEMPERATURE: 97.3 F

## 2020-08-05 DIAGNOSIS — N39.3 STRESS INCONTINENCE IN FEMALE: ICD-10-CM

## 2020-08-05 LAB — B-HCG SERPL-ACNC: <1 IU/L (ref 0–5)

## 2020-08-05 PROCEDURE — 36415 COLL VENOUS BLD VENIPUNCTURE: CPT | Performed by: OBSTETRICS & GYNECOLOGY

## 2020-08-05 PROCEDURE — 40000170 ZZH STATISTIC PRE-PROCEDURE ASSESSMENT II: Performed by: OBSTETRICS & GYNECOLOGY

## 2020-08-05 PROCEDURE — 57288 REPAIR BLADDER DEFECT: CPT | Performed by: OBSTETRICS & GYNECOLOGY

## 2020-08-05 PROCEDURE — 84702 CHORIONIC GONADOTROPIN TEST: CPT | Performed by: OBSTETRICS & GYNECOLOGY

## 2020-08-05 PROCEDURE — C1771 REP DEV, URINARY, W/SLING: HCPCS | Performed by: OBSTETRICS & GYNECOLOGY

## 2020-08-05 PROCEDURE — 71000027 ZZH RECOVERY PHASE 2 EACH 15 MINS: Performed by: OBSTETRICS & GYNECOLOGY

## 2020-08-05 PROCEDURE — 36000058 ZZH SURGERY LEVEL 3 EA 15 ADDTL MIN: Performed by: OBSTETRICS & GYNECOLOGY

## 2020-08-05 PROCEDURE — 25000125 ZZHC RX 250: Performed by: OBSTETRICS & GYNECOLOGY

## 2020-08-05 PROCEDURE — 27210794 ZZH OR GENERAL SUPPLY STERILE: Performed by: OBSTETRICS & GYNECOLOGY

## 2020-08-05 PROCEDURE — 36000056 ZZH SURGERY LEVEL 3 1ST 30 MIN: Performed by: OBSTETRICS & GYNECOLOGY

## 2020-08-05 DEVICE — SIS SYSTEM
Type: IMPLANTABLE DEVICE | Site: VAGINA | Status: FUNCTIONAL
Brand: SOLYX™ SIS SYSTEM

## 2020-08-05 RX ORDER — LIDOCAINE HYDROCHLORIDE 20 MG/ML
JELLY TOPICAL PRN
Status: DISCONTINUED | OUTPATIENT
Start: 2020-08-05 | End: 2020-08-05 | Stop reason: HOSPADM

## 2020-08-05 RX ORDER — LIDOCAINE HYDROCHLORIDE 10 MG/ML
INJECTION, SOLUTION EPIDURAL; INFILTRATION; INTRACAUDAL; PERINEURAL
Status: DISCONTINUED
Start: 2020-08-05 | End: 2020-08-05 | Stop reason: HOSPADM

## 2020-08-05 RX ORDER — LIDOCAINE HYDROCHLORIDE 20 MG/ML
JELLY TOPICAL
Status: DISCONTINUED
Start: 2020-08-05 | End: 2020-08-05 | Stop reason: HOSPADM

## 2020-08-05 RX ORDER — LIDOCAINE HYDROCHLORIDE 10 MG/ML
INJECTION, SOLUTION EPIDURAL; INFILTRATION; INTRACAUDAL; PERINEURAL PRN
Status: DISCONTINUED | OUTPATIENT
Start: 2020-08-05 | End: 2020-08-05 | Stop reason: HOSPADM

## 2020-08-05 RX ADMIN — LIDOCAINE HYDROCHLORIDE 3 ML: 10 INJECTION, SOLUTION EPIDURAL; INFILTRATION; INTRACAUDAL; PERINEURAL at 10:40

## 2020-08-05 RX ADMIN — LIDOCAINE HYDROCHLORIDE 20 ML: 10 INJECTION, SOLUTION EPIDURAL; INFILTRATION; INTRACAUDAL; PERINEURAL at 10:29

## 2020-08-05 RX ADMIN — LIDOCAINE HYDROCHLORIDE 4 ML: 10 INJECTION, SOLUTION EPIDURAL; INFILTRATION; INTRACAUDAL; PERINEURAL at 10:24

## 2020-08-05 RX ADMIN — LIDOCAINE HYDROCHLORIDE 4 ML: 20 JELLY TOPICAL at 10:23

## 2020-08-05 ASSESSMENT — MIFFLIN-ST. JEOR: SCORE: 1313.17

## 2020-08-05 NOTE — OP NOTE
Procedure Date: 2020      PROCEDURES PERFORMED:  Mid urethral sling with Solyx, diagnostic cystoscopy.      PREOPERATIVE DIAGNOSIS:  Urinary stress incontinence.      POSTOPERATIVE DIAGNOSIS:  Urinary stress incontinence.      ANESTHESIA:  Local.      SURGEON:  Sanjeev Dolan MD      ESTIMATED BLOOD LOSS:  20 mL      FINDINGS:  Normal bladder on cystoscopy.      INDICATIONS FOR PROCEDURE:  The patient is a 39-year-old status post 3 vaginal deliveries, who has urinary stress incontinence.  Formal testing was done in the office showing normal sphincter tone.  The patient was counseled as to risks and benefits of the procedure and she wished to proceed.      DESCRIPTION OF PROCEDURE:  The patient was brought to the operating room.  Legs were placed in Cesar stirrups.  She was then prepped and draped in the usual fashion.  Urethra was injected with lidocaine gel.  The midurethral area was injected with local.  Incision was made with Merino scissors parallel to the urethra.  This was undermined distally and proximally in order to allow the mesh to lay flat.  Injection of local was then done, both right and left sides.  A suitable channel was created with the Merino scissors.  The sling was placed on the patient's right side first and then left side.  Cystoscopy was performed, with no mesh noted within the bladder.  Bladder was emptied and then filled with water.  The patient coughed vigorously with no leakage.  Therefore, the sling was left in place.      The incision was repaired with a running suture of 4-0 Vicryl with excellent hemostasis.  The patient was then brought to the recovery room, at which point she urinated without difficulty.  The patient was then discharged to home.         SANJEEV DOLAN MD             D: 2020   T: 2020   MT: PATRICK      Name:     YOVANI LINN   MRN:      [id number]        Account:        [id number]   :      1980           Procedure Date: 2020       Document: [id number]

## 2020-08-05 NOTE — DISCHARGE INSTRUCTIONS
Discharge instructions for Bladder Surgery    General Care:      Drink plenty of fluids.  It is important to stay hydrated      You will have some blood/discharge from your vagina.  You can expect to have discharge up to 3-4 days. No tampons, douches or intercourse until instructed by your surgeon.       Monitor your urine output.  If you have not urinated in 6 hours after leaving the hospital, please contact your surgeon.      You may have two incisions on your groin after bladder sling surgery. These incisions usually closed with glue called dermabond. Follow surgeon's orders for bathing and showering.    Reasons to Contact Your Surgeon:      Large amount of drainage or bleeding (saturating more than 1 pad per hour).      Fever.      Inability or difficulty with urination      Pain with urination    **If you have questions or concerns about your procedure,   call Dr. Pozo at  [phone]**

## 2020-08-05 NOTE — PROGRESS NOTES
[facility]  Gynecology Brief Operative Note    Pre-operative diagnosis: Stress incontinence in female [N39.3]   Post-operative diagnosis same   Procedure: Procedure(s):  MIDURETHRAL SLING WITH DIAGNOSTIC CYSTOSCOPY   Surgeon: Sanjeev Pozo MD   Assistants(s): none   Anesthesia: Local    Estimated blood loss: 20cc            Total urine output:    Drains: None   Specimens: None   Implants: Solyx sling   Findings: Normal bladder   Complications: None   Condition: Stable   Comments: See dictated operative report for full details

## 2020-08-05 NOTE — OR NURSING
Discharge Note: Janay Tam is alert, oriented x 4. Vital Signs are stable and is up independently .  Pain : none.  AVS Discharge Instruction discussed with patient.  Patient verbalized understanding instruction and was  discharged to home to be  accompanied by her  Tremayne

## 2020-08-11 ENCOUNTER — OFFICE VISIT (OUTPATIENT)
Dept: VASCULAR SURGERY | Facility: CLINIC | Age: 40
End: 2020-08-11
Payer: COMMERCIAL

## 2020-08-11 DIAGNOSIS — I83.813 VARICOSE VEINS OF LOWER EXTREMITY WITH PAIN, BILATERAL: Primary | ICD-10-CM

## 2020-08-11 DIAGNOSIS — I83.93 SPIDER VEINS OF BOTH LOWER EXTREMITIES: ICD-10-CM

## 2020-08-11 PROCEDURE — 99203 OFFICE O/P NEW LOW 30 MIN: CPT | Performed by: SURGERY

## 2020-08-11 RX ORDER — FAMOTIDINE 10 MG
10 TABLET ORAL PRN
COMMUNITY

## 2020-08-11 ASSESSMENT — ENCOUNTER SYMPTOMS
DEPRESSION: 1
NERVOUS/ANXIOUS: 1

## 2020-08-11 NOTE — PROGRESS NOTES
After Visit Follow Up  Per pt request, faxed her compression hose Rx to [facility]. Pt would like 1 pair of black, open-toe, thigh high, 20-30mmhg compression hose.    Pt aware they will be shipped to her home address.    Candie Farmer, FRANCIAN, RN  [facility]  [employer]

## 2020-08-11 NOTE — PROGRESS NOTES
[facility] VASCULAR [facility] INITIAL VASCULAR SURGERY CONSULT    Impression:   1.  Bilateral lower extremity varicose veins with pain.    2.  Asymptomatic bilateral lower extremity spider veins.    Plan:   She will begin conservative therapy utilizing either knee-high or thigh-high compression stockings 20 to 30 mmHg pressure.  I will arrange for bilateral lower extremity venous competency studies.  I will meet with her for reevaluation following that exam.  Ultimate treatment recommendations will be pending the outcome of her venous ultrasound.      HPI:   Janay Tam is a healthy 39-year-old female previously evaluated through this office in 2018 for bilateral lower extremity varicosities with pain and asymptomatic spider veins.  She never pursued treatment.  Her varicose veins have become more noticeable and a bit more symptomatic.  She notes aching and discomfort at the end of a long day.  She is now interested in pursuing work-up.  She has no prior history of venous intervention.  She has had prior pregnancies.      CURRENT MEDICATIONS  ALPRAZolam (XANAX) 0.5 MG tablet, Take 1 tab orally 1 hour before procedure  famotidine (PEPCID) 10 MG tablet, Take 10 mg by mouth as needed  MULTIVITAMINS OR TABS, ONE DAILY  Omega-3 Fatty Acids (FISH OIL PO),     No current facility-administered medications on file prior to visit.         PAST MEDICAL HISTORY  Past Medical History:   Diagnosis Date     Allergic rhinitis, cause unspecified     Allergic rhinitis     Cytomegaloviral disease (H) 2010    Second child with hearing loss. 6/14 IgM neg, IgG pos     Depressive disorder, not elsewhere classified     use effexor in the past - more seasonal     Generalized anxiety disorder      Papanicolaou smear of cervix with atypical squamous cells of undetermined significance (ASC-US) 2001    Unsure of outcome     Postpartum depression     with first delivery, not currently medicated         PAST SURGICAL  HISTORY:  Past Surgical History:   Procedure Laterality Date     C IUD,MIRENA  12/2010     CYSTOSCOPY, SLING TRANSOBTURATOR N/A 8/5/2020    Procedure: MIDURETHRAL SLING WITH DIAGNOSTIC CYSTOSCOPY;  Surgeon: Sanjeev Pozo MD;  Location: [facility] OR       ALLERGIES     Allergies   Allergen Reactions     Penicillins        FAMILY HISTORY  Family History   Problem Relation Age of Onset     C.A.D. Father         heart problems     Cerebrovascular Disease Father      C.A.D. Maternal Grandfather         heart problems     Diabetes Maternal Grandfather      Hypertension Mother      Depression Mother      Psychotic Disorder Mother      Hyperlipidemia Mother      Thyroid Disease Mother      Cerebrovascular Disease Paternal Grandmother      Osteoporosis Paternal Grandmother      Arthritis Paternal Grandmother      Neurologic Disorder Sister         mental health     Skin Cancer Sister      Neurologic Disorder Brother         mental health     Thyroid Disease Sister        SOCIAL HISTORY  Social History     Tobacco Use     Smoking status: Never Smoker     Smokeless tobacco: Never Used   Substance Use Topics     Alcohol use: Yes     Alcohol/week: 0.0 standard drinks     Comment: very rare     Drug use: No       ROS:   Review of Systems   Psychiatric/Behavioral: Positive for depression. The patient is nervous/anxious.    All other systems reviewed and are negative.        EXAM:  [facility] 08/03/2020   Physical Exam  Vitals signs and nursing note reviewed.   Constitutional:       Appearance: Normal appearance.   HENT:      Head: Normocephalic and atraumatic.   Eyes:      General: No scleral icterus.     Extraocular Movements: Extraocular movements intact.      Pupils: Pupils are equal, round, and reactive to light.   Neck:      Musculoskeletal: Normal range of motion.   Musculoskeletal: Normal range of motion.         General: No swelling.   Skin:     General: Skin is warm and dry.   Neurological:      General: No focal deficit  present.      Mental Status: She is alert and oriented to person, place, and time. Mental status is at baseline.   Psychiatric:         Mood and Affect: Mood normal.         Behavior: Behavior normal.         Thought Content: Thought content normal.         Judgment: Judgment normal.           Labs:  LIPID RESULTS:  Lab Results   Component Value Date    CHOL 148 08/05/2005       CBC RESULTS:  Lab Results   Component Value Date    WBC 9.5 07/25/2014    RBC 4.39 07/25/2014    HGB 14.5 02/01/2017    HCT 40.1 07/25/2014    MCV 91 07/25/2014    MCH 30.8 07/25/2014    MCHC 33.7 07/25/2014    RDW 13.5 07/25/2014     07/25/2014       BMP RESULTS:  No results found for: NA, POTASSIUM, CHLORIDE, CO2, ANIONGAP, GLC, BUN, CR, GFRESTIMATED, GFRESTBLACK, SHELLY     A1C RESULTS:  No results found for: A1C      Imaging:  No results found for this or any previous visit (from the past 24 hour(s)).      Total face-to-face time was 20 minutes, greater than 50% spent providing counseling and education.        Elan Hensley MD

## 2020-08-11 NOTE — LETTER
8/11/2020         RE: Janay Tam  [address] [place] [address]        Dear Colleague,    Thank you for referring your patient, Janay Tam, to the [facility]. Please see a copy of my visit note below.    [facility] INITIAL VASCULAR SURGERY CONSULT    Impression:   1.  Bilateral lower extremity varicose veins with pain.    2.  Asymptomatic bilateral lower extremity spider veins.    Plan:   She will begin conservative therapy utilizing either knee-high or thigh-high compression stockings 20 to 30 mmHg pressure.  I will arrange for bilateral lower extremity venous competency studies.  I will meet with her for reevaluation following that exam.  Ultimate treatment recommendations will be pending the outcome of her venous ultrasound.      HPI:   Janay Tam is a healthy 39-year-old female previously evaluated through this office in 2018 for bilateral lower extremity varicosities with pain and asymptomatic spider veins.  She never pursued treatment.  Her varicose veins have become more noticeable and a bit more symptomatic.  She notes aching and discomfort at the end of a long day.  She is now interested in pursuing work-up.  She has no prior history of venous intervention.  She has had prior pregnancies.      CURRENT MEDICATIONS  ALPRAZolam (XANAX) 0.5 MG tablet, Take 1 tab orally 1 hour before procedure  famotidine (PEPCID) 10 MG tablet, Take 10 mg by mouth as needed  MULTIVITAMINS OR TABS, ONE DAILY  Omega-3 Fatty Acids (FISH OIL PO),     No current facility-administered medications on file prior to visit.         PAST MEDICAL HISTORY  Past Medical History:   Diagnosis Date     Allergic rhinitis, cause unspecified     Allergic rhinitis     Cytomegaloviral disease (H) 2010    Second child with hearing loss. 6/14 IgM neg, IgG pos     Depressive disorder, not elsewhere classified     use effexor in the past - more seasonal      Generalized anxiety disorder      Papanicolaou smear of cervix with atypical squamous cells of undetermined significance (ASC-US) 2001    Unsure of outcome     Postpartum depression     with first delivery, not currently medicated         PAST SURGICAL HISTORY:  Past Surgical History:   Procedure Laterality Date     C IUD,MIRENA  12/2010     CYSTOSCOPY, SLING TRANSOBTURATOR N/A 8/5/2020    Procedure: MIDURETHRAL SLING WITH DIAGNOSTIC CYSTOSCOPY;  Surgeon: Sanjeev Pozo MD;  Location: [facility]       ALLERGIES     Allergies   Allergen Reactions     Penicillins        FAMILY HISTORY  Family History   Problem Relation Age of Onset     C.A.D. Father         heart problems     Cerebrovascular Disease Father      C.A.D. Maternal Grandfather         heart problems     Diabetes Maternal Grandfather      Hypertension Mother      Depression Mother      Psychotic Disorder Mother      Hyperlipidemia Mother      Thyroid Disease Mother      Cerebrovascular Disease Paternal Grandmother      Osteoporosis Paternal Grandmother      Arthritis Paternal Grandmother      Neurologic Disorder Sister         mental health     Skin Cancer Sister      Neurologic Disorder Brother         mental health     Thyroid Disease Sister        SOCIAL HISTORY  Social History     Tobacco Use     Smoking status: Never Smoker     Smokeless tobacco: Never Used   Substance Use Topics     Alcohol use: Yes     Alcohol/week: 0.0 standard drinks     Comment: very rare     Drug use: No       ROS:   Review of Systems   Psychiatric/Behavioral: Positive for depression. The patient is nervous/anxious.    All other systems reviewed and are negative.        EXAM:  LMP 08/03/2020   Physical Exam  Vitals signs and nursing note reviewed.   Constitutional:       Appearance: Normal appearance.   HENT:      Head: Normocephalic and atraumatic.   Eyes:      General: No scleral icterus.     Extraocular Movements: Extraocular movements intact.      Pupils: Pupils are  equal, round, and reactive to light.   Neck:      Musculoskeletal: Normal range of motion.   Musculoskeletal: Normal range of motion.         General: No swelling.   Skin:     General: Skin is warm and dry.   Neurological:      General: No focal deficit present.      Mental Status: She is alert and oriented to person, place, and time. Mental status is at baseline.   Psychiatric:         Mood and Affect: Mood normal.         Behavior: Behavior normal.         Thought Content: Thought content normal.         Judgment: Judgment normal.           Labs:  LIPID RESULTS:  Lab Results   Component Value Date    CHOL 148 08/05/2005       CBC RESULTS:  Lab Results   Component Value Date    WBC 9.5 07/25/2014    RBC 4.39 07/25/2014    HGB 14.5 02/01/2017    HCT 40.1 07/25/2014    MCV 91 07/25/2014    MCH 30.8 07/25/2014    MCHC 33.7 07/25/2014    RDW 13.5 07/25/2014     07/25/2014       BMP RESULTS:  No results found for: NA, POTASSIUM, CHLORIDE, CO2, ANIONGAP, GLC, BUN, CR, GFRESTIMATED, GFRESTBLACK, SHELLY     A1C RESULTS:  No results found for: A1C      Imaging:  No results found for this or any previous visit (from the past 24 hour(s)).      Total face-to-face time was 20 minutes, greater than 50% spent providing counseling and education.        Elan Hensley MD            [facility]                Again, thank you for allowing me to participate in the care of your patient.        Sincerely,        Elan Hensley MD

## 2020-08-26 ENCOUNTER — OFFICE VISIT (OUTPATIENT)
Dept: VASCULAR SURGERY | Facility: CLINIC | Age: 40
End: 2020-08-26
Attending: SURGERY
Payer: COMMERCIAL

## 2020-08-26 ENCOUNTER — ANCILLARY PROCEDURE (OUTPATIENT)
Dept: ULTRASOUND IMAGING | Facility: CLINIC | Age: 40
End: 2020-08-26
Attending: SURGERY
Payer: COMMERCIAL

## 2020-08-26 DIAGNOSIS — I83.812 VARICOSE VEINS OF LEFT LOWER EXTREMITY WITH PAIN: Primary | ICD-10-CM

## 2020-08-26 DIAGNOSIS — I83.813 VARICOSE VEINS OF LOWER EXTREMITY WITH PAIN, BILATERAL: ICD-10-CM

## 2020-08-26 PROCEDURE — 99214 OFFICE O/P EST MOD 30 MIN: CPT | Performed by: SURGERY

## 2020-08-26 PROCEDURE — 93970 EXTREMITY STUDY: CPT | Performed by: SURGERY

## 2020-08-26 NOTE — LETTER
8/26/2020         RE: Janay Tam  [address] [place] [address]        Dear Colleague,    Thank you for referring your patient, Janay Tam, to the [facility]. Please see a copy of my visit note below.        [facility]    HPI:    Janay Tam is a 39 year old female who presents with complaints of bilateral bulging varicose veins and primarily left leg discomfort associated with the veins.     She feels warmth along the skin in the area of her varicosities. She gets occasional cramping in the left leg at night, often when she is sitting and watching TV. She feels aches in the back of her calf on the left during these times. She has not noticed swelling in the ankles. No itching of the skin or wounds or ulcers. She does not have specific heaviness of the legs, she may experience occasional fatigue of the legs after exertion. She is often on her feet throughout the day.    She wore compression stockings during her pregnancies, which is when she first noticed the varicosities. She has just reordered and received new stockings, and has started to wear them on a daily basis. She finds that her legs feel more supported and better overall when she wears the stockings.     She was previously seen in 2018 and then more recently by Dr. Hensley on 8/11/20, with findings of bilateral lower extremity varicosities that were a source of discomfort and pain, with aching in the legs. She additionally has asymptomatic telangiectasias.     I have reviewed and updated the history.   PAST MEDICAL HISTORY:   Past Medical History:   Diagnosis Date     Allergic rhinitis, cause unspecified     Allergic rhinitis     Cytomegaloviral disease (H) 2010    Second child with hearing loss. 6/14 IgM neg, IgG pos     Depressive disorder, not elsewhere classified     use effexor in the past - more seasonal     Generalized anxiety disorder      Papanicolaou smear of  cervix with atypical squamous cells of undetermined significance (ASC-US) 2001    Unsure of outcome     Postpartum depression     with first delivery, not currently medicated       Current Outpatient Medications   Medication Sig Dispense Refill     ALPRAZolam (XANAX) 0.5 MG tablet Take 1 tab orally 1 hour before procedure 1 tablet 0     famotidine (PEPCID) 10 MG tablet Take 10 mg by mouth as needed       MULTIVITAMINS OR TABS ONE DAILY 100 1 YEAR     Omega-3 Fatty Acids (FISH OIL PO)          PAST SURGICAL HISTORY:   Past Surgical History:   Procedure Laterality Date     C IUD,MIRENA  2010     CYSTOSCOPY, SLING TRANSOBTURATOR N/A 2020    Procedure: MIDURETHRAL SLING WITH DIAGNOSTIC CYSTOSCOPY;  Surgeon: Sanjeev Pozo MD;  Location:  OR       ALLERGIES:    Allergies   Allergen Reactions     Penicillins        FAMILY HISTORY:   Family History   Problem Relation Age of Onset     C.A.D. Father         heart problems     Cerebrovascular Disease Father      C.A.D. Maternal Grandfather         heart problems     Diabetes Maternal Grandfather      Hypertension Mother      Depression Mother      Psychotic Disorder Mother      Hyperlipidemia Mother      Thyroid Disease Mother      Cerebrovascular Disease Paternal Grandmother      Osteoporosis Paternal Grandmother      Arthritis Paternal Grandmother      Neurologic Disorder Sister         mental health     Skin Cancer Sister      Neurologic Disorder Brother         mental health     Thyroid Disease Sister    Parents and sisters with varicose veins. No known family history of thrombophilias or hemophilias.     SOCIAL HISTORY:   Social History     Tobacco Use     Smoking status: Never Smoker     Smokeless tobacco: Never Used   Substance Use Topics     Alcohol use: Yes     Alcohol/week: 0.0 standard drinks     Comment: very rare   Nonsmoker. No drinking. No illicit drug use. 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     REVIEW OF SYSTEMS:  10-pt  ROS negative except as noted in HPI.       Vital signs:  LMP 08/03/2020     PHYSICAL EXAM:  General: Sitting comfortably in chair, NAD.   HEENT: EOMI and conjugate, MMM   Respiratory: Normal respiratory effort.   Cardiovascular: Pulse is regular.   Musculoskeletal: Normal walking gait around exam room. Grossly normal and symmetric strength and ROM in BLE. No edema present.  Vascular: dorsalis pedis: palpable bilaterally; posterior tibial: palpable bilaterally.  Cap refill < 3 sec over feet/toes.  Veins: Bilateral medial thigh and calf varicosities present, most pronounced on the left posterior calf and medial thigh with reticular veins and minimal varicosities on the right leg. Scattered small telangiectasias present.  Neurologic: A&Ox4  Psychiatric: Pleasantly conversant     Venous Duplex Ultrasound:   I have reviewed the following images.  US Bilateral Venous Competency (8/26/20): final read pending. Right leg with femoral vein incompetence; competent GSV and SSV, with no evidence of DVT. Multiple incompetent varicose vein branches with competent perforating veins. Left leg with common femoral and femoral vein incompetence. The GSV is incompetent from the calf to the mid-thigh, with max reflux time of 4306 msec and size ranging from 2.7 mm to 7.0 mm. There are several incompetent varicose vein branches arising from this with competent perforators and a competent SSV.         ASSESSMENT / PLAN:  Janay Tam is a 39 year old female who presents with complaints of bilateral varicose veins, symptomatic with skin warmth and occasional leg cramping on the left leg.        I briefly discussed the pathophysiology of venous reflux and how that may be contributing to varicose veins     I reviewed the results of her ultrasound with her, and discussed that with components of deep venous incompetence, she will benefit from lifelong compression therapy use when standing or sitting for prolonged periods to prevent  recurrent symptoms of venous insufficiency    She started wearing her hose on Saturday, 8/22/20 and is wearing 20-30 mmHg knee-high stockings.     I explained that she may benefit from left GSV ablation with stab phlebectomy of the varicosities, but that we should assess her after a full 3 months of conservative therapy with compression hose.     I explained that in the absence of symptoms, her right leg varicose veins are primarily a cosmetic concern and are not predicted to cause significant problems for her. I discussed the potential risks of varicose veins, including SVT or bleeding.     I explained that her prominent pedal veins are a variant of normal anatomy and reflect the fact that she is young and physically active. I explained that her foot veins are not varicosities and would not be treated as such.     Will have her follow up in clinic to evaluate response to conservative therapy and discuss further interventions if needed    Cathy Frances MD      Again, thank you for allowing me to participate in the care of your patient.        Sincerely,        Cathy Frances MD

## 2020-08-26 NOTE — PROGRESS NOTES
[facility]    HPI:    Janay Tam is a 39 year old female who presents with complaints of bilateral bulging varicose veins and primarily left leg discomfort associated with the veins.     She feels warmth along the skin in the area of her varicosities. She gets occasional cramping in the left leg at night, often when she is sitting and watching TV. She feels aches in the back of her calf on the left during these times. She has not noticed swelling in the ankles. No itching of the skin or wounds or ulcers. She does not have specific heaviness of the legs, she may experience occasional fatigue of the legs after exertion. She is often on her feet throughout the day.    She wore compression stockings during her pregnancies, which is when she first noticed the varicosities. She has just reordered and received new stockings, and has started to wear them on a daily basis. She finds that her legs feel more supported and better overall when she wears the stockings.     She was previously seen in 2018 and then more recently by Dr. Hensley on 20, with findings of bilateral lower extremity varicosities that were a source of discomfort and pain, with aching in the legs. She additionally has asymptomatic telangiectasias.     I have reviewed and updated the history.   PAST MEDICAL HISTORY:   Past Medical History:   Diagnosis Date     Allergic rhinitis, cause unspecified     Allergic rhinitis     Cytomegaloviral disease (H)     Second child with hearing loss.  IgM neg, IgG pos     Depressive disorder, not elsewhere classified     use effexor in the past - more seasonal     Generalized anxiety disorder      Papanicolaou smear of cervix with atypical squamous cells of undetermined significance (ASC-US)     Unsure of outcome     Postpartum depression     with first delivery, not currently medicated       Current Outpatient Medications   Medication Sig Dispense Refill     ALPRAZolam  (XANAX) 0.5 MG tablet Take 1 tab orally 1 hour before procedure 1 tablet 0     famotidine (PEPCID) 10 MG tablet Take 10 mg by mouth as needed       MULTIVITAMINS OR TABS ONE DAILY 100 1 YEAR     Omega-3 Fatty Acids (FISH OIL PO)          PAST SURGICAL HISTORY:   Past Surgical History:   Procedure Laterality Date     C IUD,MIRENA  12/2010     CYSTOSCOPY, SLING TRANSOBTURATOR N/A 8/5/2020    Procedure: MIDURETHRAL SLING WITH DIAGNOSTIC CYSTOSCOPY;  Surgeon: Sanjeev Pozo MD;  Location:  OR       ALLERGIES:    Allergies   Allergen Reactions     Penicillins        FAMILY HISTORY:   Family History   Problem Relation Age of Onset     C.A.D. Father         heart problems     Cerebrovascular Disease Father      C.A.D. Maternal Grandfather         heart problems     Diabetes Maternal Grandfather      Hypertension Mother      Depression Mother      Psychotic Disorder Mother      Hyperlipidemia Mother      Thyroid Disease Mother      Cerebrovascular Disease Paternal Grandmother      Osteoporosis Paternal Grandmother      Arthritis Paternal Grandmother      Neurologic Disorder Sister         mental health     Skin Cancer Sister      Neurologic Disorder Brother         mental health     Thyroid Disease Sister    Parents and sisters with varicose veins. No known family history of thrombophilias or hemophilias.     SOCIAL HISTORY:   Social History     Tobacco Use     Smoking status: Never Smoker     Smokeless tobacco: Never Used   Substance Use Topics     Alcohol use: Yes     Alcohol/week: 0.0 standard drinks     Comment: very rare   Nonsmoker. No drinking. No illicit drug use. 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     REVIEW OF SYSTEMS:  10-pt ROS negative except as noted in HPI.       Vital signs:  LMP 08/03/2020     PHYSICAL EXAM:  General: Sitting comfortably in chair, NAD.   HEENT: EOMI and conjugate, MMM   Respiratory: Normal respiratory effort.   Cardiovascular: Pulse is regular.   Musculoskeletal:  Normal walking gait around exam room. Grossly normal and symmetric strength and ROM in BLE. No edema present.  Vascular: dorsalis pedis: palpable bilaterally; posterior tibial: palpable bilaterally.  Cap refill < 3 sec over feet/toes.  Veins: Bilateral medial thigh and calf varicosities present, most pronounced on the left posterior calf and medial thigh with reticular veins and minimal varicosities on the right leg. Scattered small telangiectasias present.  Neurologic: A&Ox4  Psychiatric: Pleasantly conversant     Venous Duplex Ultrasound:   I have reviewed the following images.  US Bilateral Venous Competency (8/26/20): final read pending. Right leg with femoral vein incompetence; competent GSV and SSV, with no evidence of DVT. Multiple incompetent varicose vein branches with competent perforating veins. Left leg with common femoral and femoral vein incompetence. The GSV is incompetent from the calf to the mid-thigh, with max reflux time of 4306 msec and size ranging from 2.7 mm to 7.0 mm. There are several incompetent varicose vein branches arising from this with competent perforators and a competent SSV.         ASSESSMENT / PLAN:  Janay Tam is a 39 year old female who presents with complaints of bilateral varicose veins, symptomatic with skin warmth and occasional leg cramping on the left leg.        I briefly discussed the pathophysiology of venous reflux and how that may be contributing to varicose veins     I reviewed the results of her ultrasound with her, and discussed that with components of deep venous incompetence, she will benefit from lifelong compression therapy use when standing or sitting for prolonged periods to prevent recurrent symptoms of venous insufficiency    She started wearing her hose on Saturday, 8/22/20 and is wearing 20-30 mmHg knee-high stockings.     I explained that she may benefit from left GSV ablation with stab phlebectomy of the varicosities, but that we should assess  her after a full 3 months of conservative therapy with compression hose.     I explained that in the absence of symptoms, her right leg varicose veins are primarily a cosmetic concern and are not predicted to cause significant problems for her. I discussed the potential risks of varicose veins, including SVT or bleeding.     I explained that her prominent pedal veins are a variant of normal anatomy and reflect the fact that she is young and physically active. I explained that her foot veins are not varicosities and would not be treated as such.     Will have her follow up in clinic to evaluate response to conservative therapy and discuss further interventions if needed    Cathy Frances MD

## 2020-09-22 ENCOUNTER — HOSPITAL ENCOUNTER (OUTPATIENT)
Dept: MAMMOGRAPHY | Facility: CLINIC | Age: 40
Discharge: HOME | End: 2020-09-22
Attending: OBSTETRICS & GYNECOLOGY | Admitting: OBSTETRICS & GYNECOLOGY
Payer: COMMERCIAL

## 2020-09-22 DIAGNOSIS — Z12.31 VISIT FOR SCREENING MAMMOGRAM: ICD-10-CM

## 2020-09-22 PROCEDURE — 77067 SCR MAMMO BI INCL CAD: CPT

## 2020-09-24 ENCOUNTER — VIRTUAL VISIT (OUTPATIENT)
Dept: FAMILY MEDICINE | Facility: OTHER | Age: 40
End: 2020-09-24
Payer: COMMERCIAL

## 2020-09-24 ENCOUNTER — TELEPHONE (OUTPATIENT)
Dept: FAMILY MEDICINE | Facility: CLINIC | Age: 40
End: 2020-09-24

## 2020-09-24 PROCEDURE — 99421 OL DIG E/M SVC 5-10 MIN: CPT | Performed by: PHYSICIAN ASSISTANT

## 2020-09-24 NOTE — PROGRESS NOTES
"Date: 2020 15:58:33  Clinician: Raymon Narayanan  Clinician NPI: [id number]  Patient: Janay Tam  Patient : 1980  Patient Address: [address] [place] [address]  Patient Phone: [phone]  Visit Protocol: URI  Patient Summary:  Janay is a 40 year old ( : 1980 ) female who initiated a [facility] Visit for COVID-19 (Coronavirus) evaluation and screening. When asked the question \"Please sign me up to receive news, health information and promotions from [facility].\", Janay responded \"No\".    Janay states her symptoms started suddenly 7-9 days ago. After her symptoms started, they improved and then got worse again.   Her symptoms consist of diarrhea.   Janay denies having cough, nasal congestion, headache, ear pain, anosmia, vomiting, rhinitis, nausea, wheezing, enlarged lymph nodes, facial pain or pressure, fever, myalgias, chills, malaise, sore throat, teeth pain, and ageusia. She also denies taking antibiotic medication in the past month and having recent facial or sinus surgery in the past 60 days. She is not experiencing dyspnea.    Pertinent COVID-19 (Coronavirus) information  In the past 14 days, Janay has not worked in a congregate living setting.   She does not work or volunteer as healthcare worker or a  and does not work or volunteer in a healthcare facility.   Janay also has not lived in a congregate living setting in the past 14 days. She does not live with a healthcare worker.   Janay has not had a close contact with a laboratory-confirmed COVID-19 patient within 14 days of symptom onset.   Since 2019, Janay and has had upper respiratory infection (URI) or influenza-like illness. Has not been diagnosed with lab-confirmed COVID-19 test      Date(s) of previous URI or influenza-like illness (free-text): 2020     Symptoms Janay experienced during previous URI or influenza-like illness as reported by the patient (free-text): Fever, chills, body aches        " Pertinent medical history  Janay typically gets a yeast infection when she takes antibiotics. She is not sure if she has used fluconazole (Diflucan) to treat previous yeast infections.   Janay does not need a return to work/school note.   Weight: 142 lbs   Janay does not smoke or use smokeless tobacco.   She denies pregnancy and denies breastfeeding. She is currently menstruating.   Weight: 142 lbs    MEDICATIONS: No current medications, ALLERGIES: Penicillins  Clinician Response:  Dear Janay,   Your symptoms show that you may have coronavirus (COVID-19). This illness can cause fever, cough and trouble breathing. Many people get a mild case and get better on their own. Some people can get very sick.  What should I do?  We would like to test you for this virus.   1. Please call [phone] to schedule your visit. Explain that you were referred by [facility] to have a COVID-19 test. Be ready to share your [facility] visit ID number.  The following will serve as your written order for this COVID Test, ordered by me, for the indication of suspected COVID [Z20.828]: The test will be ordered in [employer], our electronic health record, after you are scheduled. It will show as ordered and authorized by Lionel Gomes MD.  Order: COVID-19 (Coronavirus) PCR for SYMPTOMATIC testing from [facility].      2. When it's time for your COVID test:  Stay at least 6 feet away from others. (If someone will drive you to your test, stay in the backseat, as far away from the  as you can.)   Cover your mouth and nose with a mask, tissue or washcloth.  Go straight to the testing site. Don't make any stops on the way there or back.      3.Starting now: Stay home and away from others (self-isolate) until:   You've had no fever---and no medicine that reduces fever---for one full day (24 hours). And...   Your other symptoms have gotten better. For example, your cough or breathing has improved. And...   At least 10 days have passed since your symptoms started.    "    During this time, don't leave the house except for testing or medical care.   Stay in your own room, even for meals. Use your own bathroom if you can.   Stay away from others in your home. No hugging, kissing or shaking hands. No visitors.  Don't go to work, school or anywhere else.    Clean \"high touch\" surfaces often (doorknobs, counters, handles, etc.). Use a household cleaning spray or wipes. You'll find a full list of  on the EPA website: www.epa.gov/pesticide-registration/list-n-disinfectants-use-against-sars-cov-2.   Cover your mouth and nose with a mask, tissue or washcloth to avoid spreading germs.  Wash your hands and face often. Use soap and water.  Caregivers in these groups are at risk for severe illness due to COVID-19:  o People 65 years and older  o People who live in a nursing home or long-term care facility  o People with chronic disease (lung, heart, cancer, diabetes, kidney, liver, immunologic)  o People who have a weakened immune system, including those who:   Are in cancer treatment  Take medicine that weakens the immune system, such as corticosteroids  Had a bone marrow or organ transplant  Have an immune deficiency  Have poorly controlled HIV or AIDS  Are obese (body mass index of 40 or higher)  Smoke regularly   o Caregivers should wear gloves while washing dishes, handling laundry and cleaning bedrooms and bathrooms.  o Use caution when washing and drying laundry: Don't shake dirty laundry, and use the warmest water setting that you can.  o For more tips, go to www.cdc.gov/coronavirus/2019-ncov/downloads/10Things.pdf.    4.Sign up for [employer]. We know it's scary to hear that you might have COVID-19. We want to track your symptoms to make sure you're okay over the next 2 weeks. Please look for an email from [employer]---this is a free, online program that we'll use to keep in touch. To sign up, follow the link in the email. Learn more at [email or web address]  " How can I take care of myself?   Get lots of rest. Drink extra fluids (unless a doctor has told you not to).   Take Tylenol (acetaminophen) for fever or pain. If you have liver or kidney problems, ask your family doctor if it's okay to take Tylenol.   Adults can take either:    650 mg (two 325 mg pills) every 4 to 6 hours, or...   1,000 mg (two 500 mg pills) every 8 hours as needed.    Note: Don't take more than 3,000 mg in one day. Acetaminophen is found in many medicines (both prescribed and over-the-counter medicines). Read all labels to be sure you don't take too much.   For children, check the Tylenol bottle for the right dose. The dose is based on the child's age or weight.    If you have other health problems (like cancer, heart failure, an organ transplant or severe kidney disease): Call your specialty clinic if you don't feel better in the next 2 days.       Know when to call 911. Emergency warning signs include:    Trouble breathing or shortness of breath Pain or pressure in the chest that doesn't go away Feeling confused like you haven't felt before, or not being able to wake up Bluish-colored lips or face.  Where can I get more information?   [facility] -- About COVID-19: [email or web address]   CDC -- What to Do If You're Sick: www.cdc.gov/coronavirus/2019-ncov/about/steps-when-sick.html   CDC -- Ending Home Isolation: www.cdc.gov/coronavirus/2019-ncov/hcp/disposition-in-home-patients.html   CDC -- Caring for Someone: www.cdc.gov/coronavirus/2019-ncov/if-you-are-sick/care-for-someone.html   [facility] -- Interim Guidance for Hospital Discharge to Home: www.health.[facility].[place].us/diseases/coronavirus/hcp/hospdischarge.pdf   [facility] clinical trials (COVID-19 research studies): [email or web address].[facility]/umn-clinical-trials    Below are the COVID-19 hotlines at the [place] Department of Health ([facility]). Interpreters are available.    For health questions: Call 777-907-6802 or  [phone] (7 a.m. to 7 p.m.) For questions about schools and childcare: Call 904-351-1076 or 1-611.596.4611 (7 a.m. to 7 p.m.)    Diagnosis: Diarrhea, unspecified  Diagnosis ICD: R19.7

## 2020-09-24 NOTE — TELEPHONE ENCOUNTER
Reason for call:  Patient reporting a symptom    Symptom or request:  Diarrhea/cramping sx     Duration (how long have symptoms been present):   8 days    Have you been treated for this before? No    Additional comments:  Please triage pt rgarding sx.      Phone Number patient can be reached at:  Home number on file [phone] (home)    Best Time:  any    Can we leave a detailed message on this number:  YES    Call taken on 9/24/2020 at 2:31 PM by Kirill Hickman

## 2020-09-25 ENCOUNTER — AMBULATORY - HEALTHEAST (OUTPATIENT)
Dept: INTERNAL MEDICINE | Facility: CLINIC | Age: 40
End: 2020-09-25

## 2020-09-25 ENCOUNTER — AMBULATORY - HEALTHEAST (OUTPATIENT)
Dept: FAMILY MEDICINE | Facility: CLINIC | Age: 40
End: 2020-09-25

## 2020-09-25 ENCOUNTER — VIRTUAL VISIT (OUTPATIENT)
Dept: FAMILY MEDICINE | Facility: CLINIC | Age: 40
End: 2020-09-25
Payer: COMMERCIAL

## 2020-09-25 DIAGNOSIS — R19.7 DIARRHEA OF PRESUMED INFECTIOUS ORIGIN: Primary | ICD-10-CM

## 2020-09-25 DIAGNOSIS — Z20.822 SUSPECTED 2019 NOVEL CORONAVIRUS INFECTION: ICD-10-CM

## 2020-09-25 PROCEDURE — 99213 OFFICE O/P EST LOW 20 MIN: CPT | Mod: 95 | Performed by: PHYSICIAN ASSISTANT

## 2020-09-25 NOTE — PROGRESS NOTES
"Janay Tam is a 40 year old female who is being evaluated via a billable video visit.      The patient has been notified of following:     \"This video visit will be conducted via a call between you and your physician/provider. We have found that certain health care needs can be provided without the need for an in-person physical exam.  This service lets us provide the care you need with a video conversation.  If a prescription is necessary we can send it directly to your pharmacy.  If lab work is needed we can place an order for that and you can then stop by our lab to have the test done at a later time.    Video visits are billed at different rates depending on your insurance coverage.  Please reach out to your insurance provider with any questions.    If during the course of the call the physician/provider feels a video visit is not appropriate, you will not be charged for this service.\"    Patient has given verbal consent for Video visit? Yes  How would you like to obtain your AVS? MyChart  If you are dropped from the video visit, the video invite should be resent to: Text to cell phone: [phone]  Will anyone else be joining your video visit? No    Subjective     Janay Tam is a 40 year old female who presents today via video visit for the following health issues:    HPI    Diarrhea  Onset/Duration: 9 days   Description:       Consistency of stool: watery       Blood in stool: no       Number of loose stools past 24 hours: 7  Progression of Symptoms: constant  Accompanying signs and symptoms:       Fever: no       Nausea/Vomiting: no       Abdominal pain: YES       Weight loss: no       Episodes of constipation: no  History   Ill contacts: no  Recent use of antibiotics: no  Recent travels: no  Recent medication-new or changes(Rx or OTC): no  Precipitating or alleviating factors: doesn't feel that this is food related, but does try to eat a slice of bread occasionally which seems to help " somewhat   Therapies tried and outcome: PeptoBismol and TUMS    Had one dose of cipro prior to surgery over 1 month ago, nothing since.  Denies fevers, chills.  Appetite is ok, keeping fluids down.     Video Start Time: 8:57 AM        Review of Systems   Constitutional, HEENT, cardiovascular, pulmonary, gi and gu systems are negative, except as otherwise noted.      Objective           Vitals:  No vitals were obtained today due to virtual visit.    Physical Exam     GENERAL: Healthy, alert and no distress  EYES: Eyes grossly normal to inspection.  No discharge or erythema, or obvious scleral/conjunctival abnormalities.  RESP: No audible wheeze, cough, or visible cyanosis.  No visible retractions or increased work of breathing.    SKIN: Visible skin clear. No significant rash, abnormal pigmentation or lesions.  NEURO: Cranial nerves grossly intact.  Mentation and speech appropriate for age.  PSYCH: Mentation appears normal, affect normal/bright, judgement and insight intact, normal speech and appearance well-groomed.      No results found for this or any previous visit (from the past 24 hour(s)).        Assessment & Plan     Diarrhea of presumed infectious origin  Infection is possibility with duration, other possible colitis in ddx.    - Clostridium difficile Toxin B PCR; Future  - Enteric Bacteria and Virus Panel by HERMAN Stool; Future  - Ova and Parasite Exam Routine; Future           No follow-ups on file.    Hilario Bridges PA-C  [facility]      Video-Visit Details    Type of service:  Video Visit    Video End Time:910 AM    Originating Location (pt. Location): Home    Distant Location (provider location):  [facility]     Platform used for Video Visit: Viviane

## 2020-09-27 ENCOUNTER — COMMUNICATION - HEALTHEAST (OUTPATIENT)
Dept: SCHEDULING | Facility: CLINIC | Age: 40
End: 2020-09-27

## 2020-09-28 DIAGNOSIS — R19.7 DIARRHEA, UNSPECIFIED TYPE: Primary | ICD-10-CM

## 2020-09-28 DIAGNOSIS — R19.7 DIARRHEA OF PRESUMED INFECTIOUS ORIGIN: ICD-10-CM

## 2020-09-28 LAB
C COLI+JEJUNI+LARI FUSA STL QL NAA+PROBE: NOT DETECTED
C DIFF TOX B STL QL: NEGATIVE
EC STX1 GENE STL QL NAA+PROBE: NOT DETECTED
EC STX2 GENE STL QL NAA+PROBE: NOT DETECTED
ENTERIC PATHOGEN COMMENT: NORMAL
NOROV GI+II ORF1-ORF2 JNC STL QL NAA+PR: NOT DETECTED
RVA NSP5 STL QL NAA+PROBE: NOT DETECTED
SALMONELLA SP RPOD STL QL NAA+PROBE: NOT DETECTED
SHIGELLA SP+EIEC IPAH STL QL NAA+PROBE: NOT DETECTED
SPECIMEN SOURCE: NORMAL
V CHOL+PARA RFBL+TRKH+TNAA STL QL NAA+PR: NOT DETECTED
Y ENTERO RECN STL QL NAA+PROBE: NOT DETECTED

## 2020-09-28 PROCEDURE — 87493 C DIFF AMPLIFIED PROBE: CPT | Mod: 59 | Performed by: INTERNAL MEDICINE

## 2020-09-28 PROCEDURE — 87209 SMEAR COMPLEX STAIN: CPT | Performed by: INTERNAL MEDICINE

## 2020-09-28 PROCEDURE — 87506 IADNA-DNA/RNA PROBE TQ 6-11: CPT | Performed by: INTERNAL MEDICINE

## 2020-09-28 PROCEDURE — 87177 OVA AND PARASITES SMEARS: CPT | Performed by: INTERNAL MEDICINE

## 2020-09-29 ENCOUNTER — TELEPHONE (OUTPATIENT)
Dept: OBGYN | Facility: CLINIC | Age: 40
End: 2020-09-29

## 2020-09-29 LAB
O+P STL MICRO: ABNORMAL
O+P STL MICRO: ABNORMAL
SPECIMEN SOURCE: ABNORMAL

## 2020-09-29 RX ORDER — METRONIDAZOLE 500 MG/1
500 TABLET ORAL 3 TIMES DAILY
Qty: 21 TABLET | Refills: 0 | Status: SHIPPED | OUTPATIENT
Start: 2020-09-29 | End: 2020-10-06

## 2020-09-29 NOTE — TELEPHONE ENCOUNTER
Diarrhea for 2 wks straight and saw her PCP and waiting on results on parasites testing on stool sample;  Covid negative.  Asking if her sx's could be ovarian related.  She denies pain or lower pelvic pain.  Sx's are sour stomach. No pain. Diarrhea with intestinal cramping.    Encouraged pt to have her PCP guide her on tx. Wait for results to return and go forward from there. Would recommend GI before GYN. She is always welcome to make apt if sx's persist and her PCP agrees is appropriate.    Pt verbalized understanding, in agreement with plan, and voiced no further questions.    Candace Waters RN on 9/29/2020 at 1:53 PM

## 2020-09-29 NOTE — RESULT ENCOUNTER NOTE
Dear Janay    Your test results are attached, feel free to contact me via [employer]     We did get your final test result back, and it does appear that you have a parasite infection.  I have called in medication to treat this.  You will take 3 times a day for a week.  Please avoid alcohol while you take as it is an interaction.      Anatoliy Bridges PA-C

## 2020-09-29 NOTE — TELEPHONE ENCOUNTER
Patient asked to speak with a nurse regarding symptoms she has been having. Thinks it may have something to due with her ovaries. Please return call.

## 2020-10-07 ENCOUNTER — MYC MEDICAL ADVICE (OUTPATIENT)
Dept: FAMILY MEDICINE | Facility: CLINIC | Age: 40
End: 2020-10-07

## 2020-10-09 ENCOUNTER — OFFICE VISIT (OUTPATIENT)
Dept: OBGYN | Facility: CLINIC | Age: 40
End: 2020-10-09
Payer: COMMERCIAL

## 2020-10-09 VITALS
HEIGHT: 64 IN | HEART RATE: 72 BPM | DIASTOLIC BLOOD PRESSURE: 72 MMHG | WEIGHT: 144 LBS | SYSTOLIC BLOOD PRESSURE: 104 MMHG | BODY MASS INDEX: 24.59 KG/M2

## 2020-10-09 DIAGNOSIS — N39.3 STRESS INCONTINENCE IN FEMALE: Primary | ICD-10-CM

## 2020-10-09 PROCEDURE — 99024 POSTOP FOLLOW-UP VISIT: CPT | Performed by: OBSTETRICS & GYNECOLOGY

## 2020-10-09 ASSESSMENT — MIFFLIN-ST. JEOR: SCORE: 1303.18

## 2020-10-09 NOTE — PROGRESS NOTES
SUBJECTIVE:                                                   Janay Tam is a 40 year old female who presents to clinic today for the following health issue(s):  Patient presents with:  Follow Up: Patient states she is able to exercise but the ease of urinating is more difficult.       Additional information: Midurethral sling with diagnostic cystoscopy on 2020.      HPI:  No USI since sling. No bleeding. No dyspareunia.  Having to lean forward to urinate. Feels she is emptying bladder.    Patient's last menstrual period was 2020 (approximate)..     Patient is sexually active, .  Using vasectomy for contraception.    reports that she has never smoked. She has never used smokeless tobacco.    STD testing offered?  Declined    Health maintenance updated:  no, due for pap smear    Today's PHQ-2 Score:   PHQ-2 (  [employer]) 2016   Q1: Little interest or pleasure in doing things 0   Q2: Feeling down, depressed or hopeless 0   PHQ-2 Score 0     Today's PHQ-9 Score:   PHQ-9 SCORE 2020   PHQ-9 Total Score -   PHQ-9 Total Score MyChart 4 (Minimal depression)   PHQ-9 Total Score 4     Today's MILA-7 Score:   MILA-7 SCORE 2020   Total Score 10 (moderate anxiety)   Total Score 10       Problem list and histories reviewed & adjusted, as indicated.  Additional history: as documented.    Patient Active Problem List   Diagnosis     Allergic rhinitis     CARDIOVASCULAR SCREENING; LDL GOAL LESS THAN 160     Anxiety     Gastroesophageal reflux disease without esophagitis     Stress incontinence in female     Past Surgical History:   Procedure Laterality Date     C IUD,MIRENA  2010     CYSTOSCOPY, SLING TRANSOBTURATOR N/A 2020    Procedure: MIDURETHRAL SLING WITH DIAGNOSTIC CYSTOSCOPY;  Surgeon: Sanjeev Pozo MD;  Location:  OR      Social History     Tobacco Use     Smoking status: Never Smoker     Smokeless tobacco: Never Used   Substance Use Topics     Alcohol use: Yes      "Alcohol/week: 0.0 standard drinks     Comment: very rare      Problem (# of Occurrences) Relation (Name,Age of Onset)    Arthritis (1) Paternal Grandmother    C.A.D. (2) Father: heart problems, Maternal Grandfather: heart problems    Cerebrovascular Disease (2) Father, Paternal Grandmother    Depression (1) Mother    Diabetes (1) Maternal Grandfather    Hyperlipidemia (1) Mother    Hypertension (1) Mother    Neurologic Disorder (2) Sister: mental health, Brother: mental health    Osteoporosis (1) Paternal Grandmother    Psychotic Disorder (1) Mother    Skin Cancer (1) Sister    Thyroid Disease (2) Mother, Sister            Current Outpatient Medications   Medication Sig     ALPRAZolam (XANAX) 0.5 MG tablet Take 1 tab orally 1 hour before procedure     famotidine (PEPCID) 10 MG tablet Take 10 mg by mouth as needed     MULTIVITAMINS OR TABS ONE DAILY     Omega-3 Fatty Acids (FISH OIL PO)      No current facility-administered medications for this visit.      Allergies   Allergen Reactions     Penicillins        ROS:  12 point review of systems negative other than symptoms noted below or in the HPI.  Genitourinary: Incontinence        OBJECTIVE:     /72 (BP Location: Right arm, Patient Position: Sitting, Cuff Size: Adult Regular)   Pulse 72   Ht 1.626 m (5' 4\")   Wt 65.3 kg (144 lb)   LMP 09/25/2020 (Approximate)   Breastfeeding No   BMI 24.72 kg/m    Body mass index is 24.72 kg/m .    Exam:  Constitutional:  Appearance: Well nourished, well developed alert, in no acute distress  Neurologic:  Mental Status:  Oriented X3.  Normal strength and tone, sensory exam grossly normal, mentation intact and speech normal.    Psychiatric:  Mentation appears normal and affect normal/bright.   PELVIC: No Mesh exposure. Healed. Nontender    In-Clinic Test Results:  No results found for this or any previous visit (from the past 24 hour(s)).    ASSESSMENT/PLAN:                                                        ICD-10-CM "    1. Stress incontinence in female  N39.3        Normal postop check.  RTC prn    Sanjeev Pozo MD  [facility] [place]

## 2020-10-25 ENCOUNTER — MYC MEDICAL ADVICE (OUTPATIENT)
Dept: OBGYN | Facility: CLINIC | Age: 40
End: 2020-10-25

## 2020-11-01 ENCOUNTER — MYC MEDICAL ADVICE (OUTPATIENT)
Dept: FAMILY MEDICINE | Facility: CLINIC | Age: 40
End: 2020-11-01

## 2020-11-18 NOTE — PROGRESS NOTES
"Janay Tam is a 40 year old female who is being evaluated via a billable telephone visit.      The patient has been notified of following:     \"This telephone visit will be conducted via a call between you and your physician/provider. We have found that certain health care needs can be provided without the need for a physical exam.  This service lets us provide the care you need with a short phone conversation.  If a prescription is necessary we can send it directly to your pharmacy.  If lab work is needed we can place an order for that and you can then stop by our lab to have the test done at a later time.    Telephone visits are billed at different rates depending on your insurance coverage. During this emergency period, for some insurers they may be billed the same as an in-person visit.  Please reach out to your insurance provider with any questions.    If during the course of the call the physician/provider feels a telephone visit is not appropriate, you will not be charged for this service.\"    Patient has given verbal consent for Telephone visit?  Yes    What phone number would you like to be contacted at? [phone]    How would you like to obtain your AVS? [facility]    Phone call duration: 17 minutes      SUBJECTIVE:                                                   Janay Tam is a 40 year old female who presents to clinic today for the following health issue(s):  Patient presents with:  Consult: low libido        HPI:  Feels like she does not have the desire to have sex anymore, shut off abruptly. Not physically or mentally.  ?pandemic stress, being together all the time. Is worried about the change.   Noticed the change over the last couple years, perhaps 36yo. Last child delivered 5yrs ago.  Has 3 kids, is tiring, finding the time for intimacy is harder.    Doesn't think there are relationship issues there.     Had a parasite in September, received abx. Also felt like it depleated " everything else. Sought care through naturopath provider. Doing diet changes. 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 Feels has had more vaginal dryness since the tx with abx. Not using lubricant.     Review of PMH, SocHx, SurHx, FHx, medications completed. Epic updated.      Patient's last menstrual period was 11/15/2020 (exact date)..     Patient is sexually active, .  Using vasectomy for contraception.    reports that she has never smoked. She has never used smokeless tobacco.        Health maintenance updated:  no    Today's PHQ-2 Score:   PHQ-2 (  [employer]) 2016   Q1: Little interest or pleasure in doing things 0   Q2: Feeling down, depressed or hopeless 0   PHQ-2 Score 0     Today's PHQ-9 Score:   PHQ-9 SCORE 2020   PHQ-9 Total Score -   PHQ-9 Total Score MyChart 4 (Minimal depression)   PHQ-9 Total Score 4     Today's MILA-7 Score:   MILA-7 SCORE 2020   Total Score 10 (moderate anxiety)   Total Score 10       Problem list and histories reviewed & adjusted, as indicated.  Additional history: as documented.    Patient Active Problem List   Diagnosis     Allergic rhinitis     CARDIOVASCULAR SCREENING; LDL GOAL LESS THAN 160     Anxiety     Gastroesophageal reflux disease without esophagitis     Stress incontinence in female     Past Surgical History:   Procedure Laterality Date     C IUD,MIRENA  2010     CYSTOSCOPY, SLING TRANSOBTURATOR N/A 2020    Procedure: MIDURETHRAL SLING WITH DIAGNOSTIC CYSTOSCOPY;  Surgeon: Sanjeev Pozo MD;  Location:  OR      Social History     Tobacco Use     Smoking status: Never Smoker     Smokeless tobacco: Never Used   Substance Use Topics     Alcohol use: Yes     Alcohol/week: 0.0 standard drinks     Comment: very rare      Problem (# of Occurrences) Relation (Name,Age of Onset)    Arthritis (1) Paternal Grandmother    C.A.D. (2) Father: heart problems, Maternal Grandfather: heart problems    Cerebrovascular Disease (2) Father, Paternal Grandmother     Depression (1) Mother    Diabetes (1) Maternal Grandfather    Hyperlipidemia (1) Mother    Hypertension (1) Mother    Neurologic Disorder (2) Sister: mental health, Brother: mental health    Osteoporosis (1) Paternal Grandmother    Psychotic Disorder (1) Mother    Skin Cancer (1) Sister    Thyroid Disease (2) Mother, Sister            Current Outpatient Medications   Medication Sig     famotidine (PEPCID) 10 MG tablet Take 10 mg by mouth as needed     MULTIVITAMINS OR TABS ONE DAILY     Omega-3 Fatty Acids (FISH OIL PO)      No current facility-administered medications for this visit.      Allergies   Allergen Reactions     Penicillins        ROS:  12 point review of systems negative other than symptoms noted below or in the HPI.        OBJECTIVE:     LMP 11/15/2020 (Exact Date)   Breastfeeding No   There is no height or weight on file to calculate BMI.    Exam:  No exam phone visit    ASSESSMENT/PLAN:                                                        ICD-10-CM    1. Low libido  R68.82          -Acknowledged current covid changes also affecting routines at home, she and  being home. Discussed potential couples counseling. Rec communication with partner on what she is feeling.   Rec managing discomfort with lubricant.  Rec downloading the [employer] rich.  Given phone number for the sexual med clinic at the .  Questions answered.  Pt happy with these resources, f/u as needed.    Becky Corrigan Masters, DO  [facility] [place]

## 2020-11-19 ENCOUNTER — VIRTUAL VISIT (OUTPATIENT)
Dept: OBGYN | Facility: CLINIC | Age: 40
End: 2020-11-19
Payer: COMMERCIAL

## 2020-11-19 DIAGNOSIS — R68.82 LOW LIBIDO: Primary | ICD-10-CM

## 2020-11-19 PROCEDURE — 99213 OFFICE O/P EST LOW 20 MIN: CPT | Mod: 95 | Performed by: OBSTETRICS & GYNECOLOGY

## 2020-11-20 ENCOUNTER — MYC MEDICAL ADVICE (OUTPATIENT)
Dept: FAMILY MEDICINE | Facility: CLINIC | Age: 40
End: 2020-11-20

## 2020-11-20 ENCOUNTER — OFFICE VISIT (OUTPATIENT)
Dept: FAMILY MEDICINE | Facility: CLINIC | Age: 40
End: 2020-11-20
Payer: COMMERCIAL

## 2020-11-20 VITALS — SYSTOLIC BLOOD PRESSURE: 124 MMHG | DIASTOLIC BLOOD PRESSURE: 74 MMHG

## 2020-11-20 DIAGNOSIS — D48.5 NEOPLASM OF UNCERTAIN BEHAVIOR OF SKIN: Primary | ICD-10-CM

## 2020-11-20 PROCEDURE — 99213 OFFICE O/P EST LOW 20 MIN: CPT | Mod: 25 | Performed by: PHYSICIAN ASSISTANT

## 2020-11-20 PROCEDURE — 17110 DESTRUCTION B9 LES UP TO 14: CPT | Performed by: PHYSICIAN ASSISTANT

## 2020-11-20 NOTE — PROGRESS NOTES
HPI:  Janay Tam is a 40 year old female patient here today for growth on left leg .  Patient states this has been present for months.  Patient reports the following symptoms: bothersome, raised .  Patient reports the following previous treatments: otc compound w with some improvmwent.  Patient reports the following modifying factors: none.  Associated symptoms: none.  Patient has no other skin complaints today.  Remainder of the HPI, Meds, PMH, Allergies, FH, and SH was reviewed in chart.    Pertinent Hx:   Personal history of actinic keratoses.  No personal hx of skin cancer. Sister had a skin cancer.   Past Medical History:   Diagnosis Date     Allergic rhinitis, cause unspecified     Allergic rhinitis     Cytomegaloviral disease (H) 2010    Second child with hearing loss. 6/14 IgM neg, IgG pos     Depressive disorder, not elsewhere classified     use effexor in the past - more seasonal     Generalized anxiety disorder      GERD (gastroesophageal reflux disease)      Papanicolaou smear of cervix with atypical squamous cells of undetermined significance (ASC-US) 2001    Unsure of outcome     Postpartum depression     with first delivery, not currently medicated       Past Surgical History:   Procedure Laterality Date     C IUD,MIRENA  12/2010     CYSTOSCOPY, SLING TRANSOBTURATOR N/A 8/5/2020    Procedure: MIDURETHRAL SLING WITH DIAGNOSTIC CYSTOSCOPY;  Surgeon: Sanjeev Pozo MD;  Location:  OR        Family History   Problem Relation Age of Onset     C.A.D. Father         heart problems     Cerebrovascular Disease Father      C.A.D. Maternal Grandfather         heart problems     Diabetes Maternal Grandfather      Hypertension Mother      Depression Mother      Psychotic Disorder Mother      Hyperlipidemia Mother      Thyroid Disease Mother      Cerebrovascular Disease Paternal Grandmother      Osteoporosis Paternal Grandmother      Arthritis Paternal Grandmother      Neurologic Disorder Sister          mental health     Skin Cancer Sister      Neurologic Disorder Brother         mental health     Thyroid Disease Sister        Social History     Socioeconomic History     Marital status:      Spouse name: Not on file     Number of children: 3     Years of education: Not on file     Highest education level: Not on file   Occupational History     Employer: [employer]ER   Social Needs     Financial resource strain: Not on file     Food insecurity     Worry: Not on file     Inability: Not on file     Transportation needs     Medical: Not on file     Non-medical: Not on file   Tobacco Use     Smoking status: Never Smoker     Smokeless tobacco: Never Used   Substance and Sexual Activity     Alcohol use: Yes     Alcohol/week: 0.0 standard drinks     Comment: very rare     Drug use: No     Sexual activity: Yes     Partners: Male     Birth control/protection: Male Surgical     Comment: vasectomy   Lifestyle     Physical activity     Days per week: Not on file     Minutes per session: Not on file     Stress: Not on file   Relationships     Social connections     Talks on phone: Not on file     Gets together: Not on file     Attends Confucianist service: Not on file     Active member of club or organization: Not on file     Attends meetings of clubs or organizations: Not on file     Relationship status: Not on file     Intimate partner violence     Fear of current or ex partner: Not on file     Emotionally abused: Not on file     Physically abused: Not on file     Forced sexual activity: Not on file   Other Topics Concern     Parent/sibling w/ CABG, MI or angioplasty before 65F 55M? Not Asked   Social History Narrative    Social Documentation:10/21/09        Balanced Diet: YES    Calcium intake: 2 per day    Caffeine: 1-2 cup per day    Exercise:  type of activity walking, lift wts ;  7 times per week    Sunscreen: Yes     Seatbelts:  Yes    Self Breast Exam:  Yes  sometimes    Self Testicular Exam: No - n/a     Physical/Emotional/Sexual Abuse: No    Do you feel safe in your environment? Yes        Cholesterol screen up to date:  No    Eye Exam up to date: Yes    Dental Exam up to date: Yes    Pap smear up to date: Yes    Mammogram up to date: Does Not Apply    Dexa Scan up to date: Does Not Apply    Colonoscopy up to date: Does Not Apply    Immunizations up to date: Yes td 2000    Glucose screen if over 40:   N/a        Eleanor Perdue [facility]            Outpatient Encounter Medications as of 11/20/2020   Medication Sig Dispense Refill     famotidine (PEPCID) 10 MG tablet Take 10 mg by mouth as needed       MULTIVITAMINS OR TABS ONE DAILY 100 1 YEAR     Omega-3 Fatty Acids (FISH OIL PO)        No facility-administered encounter medications on file as of 11/20/2020.        Review Of Systems:  Skin: spot on left leg  Eyes: negative  Ears/Nose/Throat: negative  Respiratory: No shortness of breath, dyspnea on exertion, cough, or hemoptysis  Cardiovascular: negative  Gastrointestinal: negative  Genitourinary: negative  Musculoskeletal: negative  Neurologic: negative  Psychiatric: negative  Hematologic/Lymphatic/Immunologic: negative  Endocrine: negative      Objective:     /74   LMP 11/15/2020 (Exact Date)   Eyes: Conjunctivae/lids: Normal   ENT: Lips:  Normal  MSK: Normal  Cardiovascular: Peripheral edema none  Pulm: Breathing Normal  Neuro/Psych: Orientation: A/O x 3 Normal; Mood/Affect: Normal, NAD, WDWN  Pt accompanied by: self  Following areas examined: face ( wearing a mask), left ventral leg  Malcolm skin type:ii   Findings:  Pink scaly patch on left leg  Assessment and Plan:  1) NUB  Per pt was a wart.  At this point secondary lesion so difficult to know what it was before tx with compound w. Could be an ISK or a wart?    Disc ln2 pt agrees.    LN2: Treated with LN2 for 5s for 1-2 cycles. Warned risks of blistering, pain, pigment change, scarring, and incomplete resolution.  Advised patient to return if lesions  do not completely resolve within 2-3 months.  Wound care sheet given.    Follow up if lesion does not resolve in one month. Yearly FBE.

## 2020-11-20 NOTE — LETTER
11/20/2020         RE: Janay Tam  [address] [place] [address]        Dear Colleague,    Thank you for referring your patient, Janay Tam, to the [facility]. Please see a copy of my visit note below.    HPI:  Janay Tam is a 40 year old female patient here today for growth on left leg .  Patient states this has been present for months.  Patient reports the following symptoms: bothersome, raised .  Patient reports the following previous treatments: otc compound w with some improvmwent.  Patient reports the following modifying factors: none.  Associated symptoms: none.  Patient has no other skin complaints today.  Remainder of the HPI, Meds, PMH, Allergies, FH, and SH was reviewed in chart.    Pertinent Hx:   Personal history of actinic keratoses.  No personal hx of skin cancer. Sister had a skin cancer.   Past Medical History:   Diagnosis Date     Allergic rhinitis, cause unspecified     Allergic rhinitis     Cytomegaloviral disease (H) 2010    Second child with hearing loss. 6/14 IgM neg, IgG pos     Depressive disorder, not elsewhere classified     use effexor in the past - more seasonal     Generalized anxiety disorder      GERD (gastroesophageal reflux disease)      Papanicolaou smear of cervix with atypical squamous cells of undetermined significance (ASC-US) 2001    Unsure of outcome     Postpartum depression     with first delivery, not currently medicated       Past Surgical History:   Procedure Laterality Date     C IUD,MIRENA  12/2010     CYSTOSCOPY, SLING TRANSOBTURATOR N/A 8/5/2020    Procedure: MIDURETHRAL SLING WITH DIAGNOSTIC CYSTOSCOPY;  Surgeon: Sanjeev Pozo MD;  Location:  OR        Family History   Problem Relation Age of Onset     C.A.D. Father         heart problems     Cerebrovascular Disease Father      C.A.D. Maternal Grandfather         heart problems     Diabetes Maternal Grandfather      Hypertension Mother       Depression Mother      Psychotic Disorder Mother      Hyperlipidemia Mother      Thyroid Disease Mother      Cerebrovascular Disease Paternal Grandmother      Osteoporosis Paternal Grandmother      Arthritis Paternal Grandmother      Neurologic Disorder Sister         mental health     Skin Cancer Sister      Neurologic Disorder Brother         mental health     Thyroid Disease Sister        Social History     Socioeconomic History     Marital status:      Spouse name: Not on file     Number of children: 3     Years of education: Not on file     Highest education level: Not on file   Occupational History     Employer: HOMEM[employer]ER   Social Needs     Financial resource strain: Not on file     Food insecurity     Worry: Not on file     Inability: Not on file     Transportation needs     Medical: Not on file     Non-medical: Not on file   Tobacco Use     Smoking status: Never Smoker     Smokeless tobacco: Never Used   Substance and Sexual Activity     Alcohol use: Yes     Alcohol/week: 0.0 standard drinks     Comment: very rare     Drug use: No     Sexual activity: Yes     Partners: Male     Birth control/protection: Male Surgical     Comment: vasectomy   Lifestyle     Physical activity     Days per week: Not on file     Minutes per session: Not on file     Stress: Not on file   Relationships     Social connections     Talks on phone: Not on file     Gets together: Not on file     Attends Spiritism service: Not on file     Active member of club or organization: Not on file     Attends meetings of clubs or organizations: Not on file     Relationship status: Not on file     Intimate partner violence     Fear of current or ex partner: Not on file     Emotionally abused: Not on file     Physically abused: Not on file     Forced sexual activity: Not on file   Other Topics Concern     Parent/sibling w/ CABG, MI or angioplasty before 65F 55M? Not Asked   Social History Narrative    Social Documentation:10/21/09         Balanced Diet: YES    Calcium intake: 2 per day    Caffeine: 1-2 cup per day    Exercise:  type of activity walking, lift wts ;  7 times per week    Sunscreen: Yes     Seatbelts:  Yes    Self Breast Exam:  Yes  sometimes    Self Testicular Exam: No - n/a    Physical/Emotional/Sexual Abuse: No    Do you feel safe in your environment? Yes        Cholesterol screen up to date:  No    Eye Exam up to date: Yes    Dental Exam up to date: Yes    Pap smear up to date: Yes    Mammogram up to date: Does Not Apply    Dexa Scan up to date: Does Not Apply    Colonoscopy up to date: Does Not Apply    Immunizations up to date: Yes td 2000    Glucose screen if over 40:   N/a        Eleanor Perdue [facility]            Outpatient Encounter Medications as of 11/20/2020   Medication Sig Dispense Refill     famotidine (PEPCID) 10 MG tablet Take 10 mg by mouth as needed       MULTIVITAMINS OR TABS ONE DAILY 100 1 YEAR     Omega-3 Fatty Acids (FISH OIL PO)        No facility-administered encounter medications on file as of 11/20/2020.        Review Of Systems:  Skin: spot on left leg  Eyes: negative  Ears/Nose/Throat: negative  Respiratory: No shortness of breath, dyspnea on exertion, cough, or hemoptysis  Cardiovascular: negative  Gastrointestinal: negative  Genitourinary: negative  Musculoskeletal: negative  Neurologic: negative  Psychiatric: negative  Hematologic/Lymphatic/Immunologic: negative  Endocrine: negative      Objective:     /74   LMP 11/15/2020 (Exact Date)   Eyes: Conjunctivae/lids: Normal   ENT: Lips:  Normal  MSK: Normal  Cardiovascular: Peripheral edema none  Pulm: Breathing Normal  Neuro/Psych: Orientation: A/O x 3 Normal; Mood/Affect: Normal, NAD, WDWN  Pt accompanied by: self  Following areas examined: face ( wearing a mask), left ventral leg  Malcolm skin type:ii   Findings:  Pink scaly patch on left leg  Assessment and Plan:  1) NUB  Per pt was a wart.  At this point secondary lesion so difficult to know what  it was before tx with compound w. Could be an ISK or a wart?    Disc ln2 pt agrees.    LN2: Treated with LN2 for 5s for 1-2 cycles. Warned risks of blistering, pain, pigment change, scarring, and incomplete resolution.  Advised patient to return if lesions do not completely resolve within 2-3 months.  Wound care sheet given.    Follow up if lesion does not resolve in one month. Yearly FBE.        Again, thank you for allowing me to participate in the care of your patient.        Sincerely,        Tamiko Hobbs PA-C

## 2020-11-20 NOTE — PATIENT INSTRUCTIONS
WOUND CARE INSTRUCTIONS  FOR CRYOSURGERY        This area treated with liquid nitrogen will form a blister. You do not need to bandage the area until after the blister forms and breaks (which may be a few days).  When the blister breaks, begin daily dressing changes as follows:    1) Clean and dry the area with tap water using clean Q-tip or sterile gauze pad.    2) Apply Polysporin ointment or Bacitracin ointment over entire wound.  Do NOT use Neosporin ointment.    3) Cover the wound with a band-aid or sterile non-stick gauze pad and micropore paper tape.      REPEAT THESE INSTRUCTIONS AT LEAST ONCE A DAY UNTIL THE WOUND HAS COMPLETELY HEALED.        It is an old wives tale that a wound heals better when it is exposed to air and allowed to dry out. The wound will heal faster with a better cosmetic result if it is kept moist with ointment and covered with a bandage.  Do not let the wound dry out.      Supplies Needed:     *Cotton tipped applicators (Q-tips)   *Polysporin ointment or Bacitracin ointment (NOT NEOSPORIN)   *Band-aids, or non stick gauze pads and micropore paper tape    PATIENT INFORMATION    During the healing process you will notice a number of changes. All wounds develop a small halo of redness surrounding the wound.  This means healing is occurring. Severe itching with extensive redness usually indicates sensitivity to the ointment or bandage tape used to dress the wound.  You should call our office if this develops.      Swelling and/or discoloration around your surgical site is common, particularly when performed around the eye.    All wounds normally drain.  The larger the wound the more drainage there will be.  After 7-10 days, you will notice the wound beginning to shrink and new skin will begin to grow.  The wound is healed when you can see skin has formed over the entire area.  A healed wound has a healthy, shiny look to the surface and is red to dark pink in color to normalize.  Wounds may  take approximately 4-6 weeks to heal.  Larger wounds may take 6-8 weeks.  After the wound is healed you may discontinue dressing changes.    You may experience a sensation of tightness as your wound heals. This is normal and will gradually subside.    Your healed wound may be sensitive to temperature changes. This sensitivity improves with time, but if you re having a lot of discomfort, try to avoid temperature extremes.    Patients frequently experience itching after their wound appears to have healed because of the continue healing under the skin.  Plain Vaseline will help relieve the itching.         WARTS  Warts are caused by the Human Papilloma Virus.They are commonly spread by direct contact or autoinoculation. That mean if the wart is picked at it could spread to another area of skin.   In children without treatment 50% of warts will disappear spontaneous in 6 months, 90% are gone in 2 years. In adults they can last for a longer period of time, but they can resolve without treatment.   No method of treatment is better than the other. You just have to be consistent with your treatments and return every 4-6 weeks.   In between treatments you should use either salicylic acid or mediplast tape:    Mediplast tape: Cut to size of wart, leave tape on for 1 week, (Put duct tape over it to secure it). In 1 week, pare skin down with a pumice stone and repeat. You want to pare down until you see some pinpoint bleeding. Do this for 6-8 weeks, if no improvement, make follow up appt.     Wart stick can be purchased online. (Twice a day, warts turn white, then pare down with a pumice stone and repeat) Do this for 6-8 weeks, if no improvement, make a follow up appt.       WART TREATMENTS    Wart(s), or verruca vulgaris  are a very common, benign skin condition caused by a virus.  Since warts are caused by a viral infection, your own immune system will typically clear the lesion on average from several months to 2 years.  Although wart(s) do not easily spread to others, the virus may still pass through direct contact (picking or scratching) and/or indirect contact (locker rooms/public showers).     It is important to remember that there is no cure for the wart virus. This means that warts can return at the same site or appear in a new spot.    Sometimes, it seems that new warts appear as fast as old ones go away. This happens when the old warts shed virus cells into the skin before the warts are treated. This allows new warts to grow around the first warts. The best way to prevent this is to have your dermatologist treat new warts as soon as they appear.    There are several technique/methods to making the wart(s) smaller in size or to help stimulate your immune system to clear the wart(s). The mainstay of treatment of wart(s) is to destroy the infected skin cells from the virus and to prevent recurrence.  The most important thing to remember is that regular consistent treatment is the most effective way to get rid of the wart.    Salicylic acid & Debridement   The first line treatment of warts is application of salicylic acid (with or without duct tape occlusion). Application of salicylic acid allows the wart(s) to stay flat and not callused. This allows other topical treatments to work better.      We recommend Wart Stick or Mediplast Tape over-the-counter   Directions: Apply the Wart Stick to the site twice daily or apply a piece of the Mediplast tape to the wart and cover tightly with tape to occlude the lesion.   Keep the medication on for 24 hours before removing/changing. Do this foe one week continuously.      After one week, when the salicylic acid is removed from the affected area the wart(s) area should turn the skin white/softened. Use an emery board/paring tool to rub off the softened tissue before changing a new salicylic acid application. Make sure you dispose the emery board and do NOT reuse on another site.      Cryotherapy or Freezing   Freezing wart(s) with a very cold substance (liquid nitrogen) is an effective treatment for common warts. The wart(s) are frozen off to kill the viral activity in and around the affected area. The treated areas will become sore and/or red for the next few hours. Some adverse reactions of this treatment include: pain, blisters, blood blisters, infection, and/or lightening or darkening of the skin.   At times, when the wart is too thick and/or callused, the clinician will shave/pare down the thickened skin prior to spraying the wart(s) with liquid nitrogen.     Aldara  Cream   Aldara  Cream is a topical medication that activates your own immune system to help attack the cells infected with the virus. The cream works for resistant or recurrent warts that do not respond to freezing and/or salicylic acid.   Directions: Open the medication packet & apply to the affected area. Cover the lesion with a band-aid. The next morning, wash off the area with soap & water.  If the cream is going to work the wart(s) should get red and irritated.      Cantharidin (Canthacur/Cantharone)   Cantharidin is a chemical compound derived from a blister beetle. This liquid medication is applied to wart(s) in order to cause blistering, thus destroying the affected areas. At your  visit, application of cantharidin to the wart(s) is usually painless and the applied areas dry clear. Three to four hours after cantharidin is applied to the warts, you must wash off the area(s) with soap and water. Adverse reactions such as redness, tenderness, blistering, itching and burning sensations may occur within 2-3 days. It is expected to take 2-4 weeks for the treated areas to heal. Please follow up your provider in 6 weeks to reassess the treated areas.     Electrosurgery and curettage  Electrosurgery (burning) and Curettage (scraping off) of the wart(s) are often are used together. The dermatologist may remove the wart by  "scraping it off before or after electrosurgery. Some adverse reactions of this treatment include: pain, scarring, infection, and/or lightening or darkening of the skin.    Excision  Cutting out the wart is another option for wart treatment.  Some adverse reactions of this treatment include: pain, scarring, infection, and/or lightening or darkening of the skin.    TREATMENT RESISTANT WARTS    If the warts are hard-to-treat, we may use one of the following treatments:     Laser treatment  Laser treatment is an option, mainly for warts that have not responded to other therapies. Some adverse reactions of this treatment include: pain, scarring, infection, and/or lightening or darkening of the skin.    Chemical peels  When flat warts appear, there are usually many warts. Because so many warts appear, dermatologists often prescribe \"peeling\" methods to treat these warts. This means, either the doctor or you will apply a peeling medicine every couple weeks or at home every day. Peeling medicines include salicylic acid (stronger than you can buy at the store), tretinoin, and glycolic acid.Some adverse reactions of this treatment include: pain, scarring, infection, and/or lightening or darkening of the skin.    Bleomycin  The wart may be injected with an anti-cancer medicine, bleomycin. The shots may hurt. Some adverse reactions of this treatment include: pain, scarring, infection, and/or lightening or darkening of the skin, or nail loss if given in the fingers.    Immunotherapy  This treatment uses the patient s own immune system to fight the warts. This treatment is used when the warts remain despite other treatments. There are two types:     i) one type of immunotherapy involves applying a chemical, such as diphencyprone (DCP), to the warts. A mild allergic reaction occurs around the treated warts. This reaction may cause the warts to go away.     ii) Another type of immunotherapy involves getting shots of Shannan antigen. "  The shots can boost     Proper skin care from [facility]:    -Eliminate harsh soaps as they strip the natural oils from the skin, often resulting in dry itchy skin ( i.e. Dial, Zest, Setswana Spring)  -Use mild soaps such as Cetaphil or Dove Sensitive Skin in the shower. You do not need to use soap on arms, legs, and trunk every time you shower unless visibly soiled.   -Avoid hot or cold showers.  -After showering, lightly dry off and apply moisturizing within 2-3 minutes. This will help trap moisture in the skin.   -Aggressive use of a moisturizer at least 1-2 times a day to the entire body (including -Vanicream, Cetaphil, Aquaphor or Cerave) and moisturize hands after every washing.  -We recommend using moisturizers that come in a tub that needs to be scooped out, not a pump. This has more of an oil base. It will hold moisture in your skin much better than a water base moisturizer. The above recommended are non-pore clogging.      Wear a sunscreen with at least SPF 30 on your face, ears, neck and V of the chest daily. Wear sunscreen on other areas of the body if those areas are exposed to the sun throughout the day. Sunscreens can contain physical and/or chemical blockers. Physical blockers are less likely to clog pores, these include zinc oxide and titanium dioxide. Reapply every two hour and after swimming. Sunscreen examples include Neutrogena, CeraVe, Blue Layo, Elta MD and many others.    UV radiation  UVA radiation remains constant throughout the day and throughout the year. It is a longer wavelength than UVB and therefore penetrates deeper into the skin leading to immediate and delayed tanning, photoaging, and skin cancer. 70-80% of UVA and UVB radiation occurs between the hours of 10am-2pm.  UVB radiation  UVB radiation causes the most harmful effects and is more significant during the summer months. However, snow and ice can reflect UVB radiation leading to skin damage during the winter months  as well. UVB radiation is responsible for tanning, burning, inflammation, delayed erythema (pinkness), pigmentation (brown spots), and skin cancer.     I recommend self monthly full body exams and yearly full body exams with a dermatology provider. If you develop a new or changing lesion please follow up for examination. Most skin cancers are pink and scaly or pink and pearly. However, we do see blue/brown/black skin cancers.  Consider the ABCDEs of melanoma when giving yourself your monthly full body exam ( don't forget the groin, buttocks, feet, toes, etc). A-asymmetry, B-borders, C-color, D-diameter, E-elevation or evolving. If you see any of these changes please follow up in clinic. If you cannot see your back I recommend purchasing a hand held mirror to use with a larger wall mirror.

## 2020-11-23 ENCOUNTER — VIRTUAL VISIT (OUTPATIENT)
Dept: FAMILY MEDICINE | Facility: OTHER | Age: 40
End: 2020-11-23

## 2020-11-23 DIAGNOSIS — Z20.822 SUSPECTED COVID-19 VIRUS INFECTION: Primary | ICD-10-CM

## 2020-11-23 NOTE — PROGRESS NOTES
"Date: 2020 14:25:35  Clinician: Raymon Narayanan  Clinician NPI: [id number]  Patient: Janay Tam  Patient : 1980  Patient Address: [address] [place] [address]  Patient Phone: [phone]  Visit Protocol: URI  Patient Summary:  Janay is a 40 year old ( : 1980 ) female who initiated a [facility] Visit for COVID-19 (Coronavirus) evaluation and screening. When asked the question \"Please sign me up to receive news, health information and promotions from [facility].\", Janay responded \"No\".    Janay states her symptoms started today.   Her symptoms consist of facial pain or pressure and a headache. Janay also feels feverish.   Symptom details     Temperature: Her current temperature is 99.3 degrees Fahrenheit.     Facial pain or pressure: The facial pain or pressure does not feel worse when bending or leaning forward.     Headache: She states the headache is mild (1-3 on a 10 point pain scale).      Janay denies having vomiting, rhinitis, myalgias, chills, malaise, sore throat, teeth pain, ageusia, diarrhea, ear pain, wheezing, cough, nasal congestion, nausea, and anosmia. She also denies taking antibiotic medication in the past month, having recent facial or sinus surgery in the past 60 days, and having a sinus infection within the past year. She is not experiencing dyspnea.    Pertinent COVID-19 (Coronavirus) information  Janay does not work or volunteer as healthcare worker or a . In the past 14 days, Janay has not worked or volunteered at a healthcare facility or group living setting.   In the past 14 days, she also has not lived in a congregate living setting.   Janay has not had a close contact with a laboratory-confirmed COVID-19 patient within 14 days of symptom onset.    Since 2019, Janay has been tested for COVID-19 and has had upper respiratory infection (URI) or influenza-like illness.      Result of COVID-19 test: Negative     Date of her COVID-19 test: 2020     " Date(s) of previous URI or influenza-like illness (free-text): 01/28/2020     Symptoms Janay experienced during previous URI or influenza-like illness as reported by the patient (free-text): Fever, chills, body aches for 12 hours        Pertinent medical history  Janay does not get yeast infections when she takes antibiotics.   Janay does not need a return to work/school note.   Weight: 139 lbs   Janay does not smoke or use smokeless tobacco.   She denies pregnancy and denies breastfeeding. She has menstruated in the past month.   Weight: 139 lbs    MEDICATIONS: No current medications, ALLERGIES: Penicillins  Clinician Response:  Dear Janay,   Your symptoms show that you may have coronavirus (COVID-19). This illness can cause fever, cough and trouble breathing. Many people get a mild case and get better on their own. Some people can get very sick.  What should I do?  We would like to test you for this virus.   1. Please call [phone] to schedule your visit. Explain that you were referred by [facility] to have a COVID-19 test. Be ready to share your [facility] visit ID number.  * If you need to schedule in [facility] please call [phone] or for [employer] employees please call [phone].  * If you need to schedule in the [place] area please call [phone]. [place] employees call [phone].  The following will serve as your written order for this COVID Test, ordered by me, for the indication of suspected COVID [Z20.828]: The test will be ordered in [employer], our electronic health record, after you are scheduled. It will show as ordered and authorized by Lionel Gomes MD.  Order: COVID-19 (Coronavirus) PCR for SYMPTOMATIC testing from [facility].   2. When it's time for your COVID test:  Stay at least 6 feet away from others. (If someone will drive you to your test, stay in the backseat, as far away from the  as you can.)   Cover your mouth and nose with a mask, tissue or washcloth.  Go straight to the testing site.  "Don't make any stops on the way there or back.      3.Starting now: Stay home and away from others (self-isolate) until:   You've had no fever---and no medicine that reduces fever---for one full day (24 hours). And...   Your other symptoms have gotten better. For example, your cough or breathing has improved. And...   At least 10 days have passed since your symptoms started.       During this time, don't leave the house except for testing or medical care.   Stay in your own room, even for meals. Use your own bathroom if you can.   Stay away from others in your home. No hugging, kissing or shaking hands. No visitors.  Don't go to work, school or anywhere else.    Clean \"high touch\" surfaces often (doorknobs, counters, handles, etc.). Use a household cleaning spray or wipes. You'll find a full list of  on the EPA website: www.epa.gov/pesticide-registration/list-n-disinfectants-use-against-sars-cov-2.   Cover your mouth and nose with a mask, tissue or washcloth to avoid spreading germs.  Wash your hands and face often. Use soap and water.  Caregivers in these groups are at risk for severe illness due to COVID-19:  o People 65 years and older  o People who live in a nursing home or long-term care facility  o People with chronic disease (lung, heart, cancer, diabetes, kidney, liver, immunologic)  o People who have a weakened immune system, including those who:   Are in cancer treatment  Take medicine that weakens the immune system, such as corticosteroids  Had a bone marrow or organ transplant  Have an immune deficiency  Have poorly controlled HIV or AIDS  Are obese (body mass index of 40 or higher)  Smoke regularly   o Caregivers should wear gloves while washing dishes, handling laundry and cleaning bedrooms and bathrooms.  o Use caution when washing and drying laundry: Don't shake dirty laundry, and use the warmest water setting that you can.  o For more tips, go to " www.cdc.gov/coronavirus/2019-ncov/downloads/10Things.pdf.       How can I take care of myself?   Get lots of rest. Drink extra fluids (unless a doctor has told you not to).   Take Tylenol (acetaminophen) for fever or pain. If you have liver or kidney problems, ask your family doctor if it's okay to take Tylenol.   Adults can take either:    650 mg (two 325 mg pills) every 4 to 6 hours, or...   1,000 mg (two 500 mg pills) every 8 hours as needed.    Note: Don't take more than 3,000 mg in one day. Acetaminophen is found in many medicines (both prescribed and over-the-counter medicines). Read all labels to be sure you don't take too much.   For children, check the Tylenol bottle for the right dose. The dose is based on the child's age or weight.    If you have other health problems (like cancer, heart failure, an organ transplant or severe kidney disease): Call your specialty clinic if you don't feel better in the next 2 days.       Know when to call 911. Emergency warning signs include:    Trouble breathing or shortness of breath Pain or pressure in the chest that doesn't go away Feeling confused like you haven't felt before, or not being able to wake up Bluish-colored lips or face.  Where can I get more information?   [facility] -- About COVID-19: [email or web address]   CDC -- What to Do If You're Sick: www.cdc.gov/coronavirus/2019-ncov/about/steps-when-sick.html   CDC -- Ending Home Isolation: www.cdc.gov/coronavirus/2019-ncov/hcp/disposition-in-home-patients.html   CDC -- Caring for Someone: www.cdc.gov/coronavirus/2019-ncov/if-you-are-sick/care-for-someone.html   [facility] -- Interim Guidance for Hospital Discharge to Home: www.health.[facility].[place]/diseases/coronavirus/hcp/hospdischarge.pdf   [facility] clinical trials (COVID-19 research studies): [email or web address]-clinical-trials    Below are the COVID-19 hotlines at the [place] Department of Health ([facility]). Interpreters are  available.    For health questions: Call 405-813-3384 or 1-572.422.9204 (7 a.m. to 7 p.m.) For questions about schools and childcare: Call 900-700-0209 or 1-839.684.6831 (7 a.m. to 7 p.m.)    Diagnosis: Contact with and (suspected) exposure to other viral communicable diseases  Diagnosis ICD: Z20.828

## 2020-11-29 ENCOUNTER — HEALTH MAINTENANCE LETTER (OUTPATIENT)
Age: 40
End: 2020-11-29

## 2020-12-02 ENCOUNTER — OFFICE VISIT (OUTPATIENT)
Dept: VASCULAR SURGERY | Facility: CLINIC | Age: 40
End: 2020-12-02
Payer: COMMERCIAL

## 2020-12-02 DIAGNOSIS — I83.93 ASYMPTOMATIC VARICOSE VEINS OF BOTH LOWER EXTREMITIES: Primary | ICD-10-CM

## 2020-12-02 PROCEDURE — 99213 OFFICE O/P EST LOW 20 MIN: CPT | Performed by: SURGERY

## 2020-12-02 NOTE — LETTER
"    12/2/2020         RE: Janay Tam  [address] [place] [address]        Dear Colleague,    Thank you for referring your patient, Janay Tam, to the [facility] [place]. Please see a copy of my visit note below.        [facility] Progress Note     Date: December 2, 2020     Reason for Visit:  Follow-up after conservative therapy with compression stockings     Subjective:  Janay Tam reports that her legs have been feeling better with the stockings. She has been wearing them in the evenings and at night, when she feels like it is more comfortable to wear the stockings. She is not having any cramping in her legs, and is not having itching or burning of the skin. She does not have trouble with walking or exercise for prolonged periods of time.      Vital signs:  [facility] 11/15/2020 (Exact Date)     PHYSICAL EXAM:  General: Sitting comfortably in chair, NAD.   Musculoskeletal: Normal walking gait around exam room. Grossly normal and symmetric strength and ROM in BLE. No edema present.  Veins: Bilateral medial thigh and calf varicosities present, most pronounced on the left posterior calf and medial thigh with reticular veins and minimal varicosities on the right leg.      Venous Duplex Ultrasound:   I have reviewed the following images.  Lower Extremity Duplex US (8/26/20):  \"RIGHT:  The deep veins demonstrate phasic flow, compress and respond to augmentations.  There is no DVT.  The femoral vein is incompetent and free of thrombus. The remaining deep veins are competent and free of thrombus. The GSV demonstrates phasic flow, compresses and responds to augmentations from the saphenofemoral junction to the ankle with no evidence of thrombus. The greater saphenous vein measures 5.3 mm at the saphenofemoral junction and 4.3 mm distally. The GSV is incompetent in the mid calf only, with the greatest reflux time of 528 milliseconds.The GSV gives rise to multiple incompetent " "varicose veins, the largest measures 5.2 mm off the distal thigh GSV with a reflux time of 7440 milliseconds. The AASV is competent. The Giacomini vein is competent. The SSV is competent. Perforators: there is no evidence of incompetent  veins at any level.  LEFT:  The deep veins demonstrate phasic flow, compress and respond to augmentations.  There is no DVT.  The common femoral and femoral veins are incompetent and free of thrombus. The GSV demonstrates phasic flow, compresses and responds to augmentations from the saphenofemoral junction to the ankle with no evidence of thrombus. The greater saphenous vein measures 5.6 mm at the saphenofemoral junction and 5.4 mm distally. The GSV is incompetent from mid thigh to distal calf, with the greatest reflux time of 4306 milliseconds. The GSV gives rise to multiple incompetent varicose veins, the largest measures 4.7 mm off the prox calf GSV  with a reflux time of 1864 milliseconds. The AASV is competent. The Giacomini vein is competent. The SSV is competent.  Perforators: there is no evidence of incompetent  veins at any level.     FINAL SUMMARY:  1.         No evidence of bilateral lower extremity deep vein thrombus.  2.         There is bilateral lower extremity deep vein reflux.  3.         Right greater saphenous vein incompetence.  3.         Left greater saphenous vein incompetence.  4.         The time of incompetence is greater than 500 milliseconds in length\"         Assessment & Plan   Janay Tam is a 40 year old female who presents with complaints of bilateral varicose veins, symptomatic with occasional skin warmth on the left leg.        Ms. Tam has been wearing compression stockings (knee-high, 20-30 mmHg) since  8/22/20    She does have a component of bilateral deep venous insufficiency, and I reiterated that compression stockings will be beneficial indefinitely in the future to prevent recurrent symptoms of venous " insufficiency. I also explained that they are generally most effective during the day, when she is up and walking around, as part of their effect is to help counteract gravity on venous pooling.     She is essentially asymptomatic at this time. I explained that we could proceed with varicose vein treatment, but this would be cosmetic in nature and not covered by insurance.     I reviewed with her that varicose veins would not be detrimental to her health, but could have complications of bleeding or SVT. I also explained that deep venous insufficiency does increase her potential for future varicose vein formation.     Reviewed symptoms of leg aching, heaviness, discomfort, swelling, and skin changes, and explained that she should return if these develop.     Follow-up as needed in future.     Cathy Frances          Again, thank you for allowing me to participate in the care of your patient.        Sincerely,        Cathy Frances MD

## 2020-12-02 NOTE — PROGRESS NOTES
"    [facility] Progress Note     Date: December 2, 2020     Reason for Visit:  Follow-up after conservative therapy with compression stockings     Subjective:  Janay Tam reports that her legs have been feeling better with the stockings. She has been wearing them in the evenings and at night, when she feels like it is more comfortable to wear the stockings. She is not having any cramping in her legs, and is not having itching or burning of the skin. She does not have trouble with walking or exercise for prolonged periods of time.      Vital signs:  [facility] 11/15/2020 (Exact Date)     PHYSICAL EXAM:  General: Sitting comfortably in chair, NAD.   Musculoskeletal: Normal walking gait around exam room. Grossly normal and symmetric strength and ROM in BLE. No edema present.  Veins: Bilateral medial thigh and calf varicosities present, most pronounced on the left posterior calf and medial thigh with reticular veins and minimal varicosities on the right leg.      Venous Duplex Ultrasound:   I have reviewed the following images.  Lower Extremity Duplex US (8/26/20):  \"RIGHT:  The deep veins demonstrate phasic flow, compress and respond to augmentations.  There is no DVT.  The femoral vein is incompetent and free of thrombus. The remaining deep veins are competent and free of thrombus. The GSV demonstrates phasic flow, compresses and responds to augmentations from the saphenofemoral junction to the ankle with no evidence of thrombus. The greater saphenous vein measures 5.3 mm at the saphenofemoral junction and 4.3 mm distally. The GSV is incompetent in the mid calf only, with the greatest reflux time of 528 milliseconds.The GSV gives rise to multiple incompetent varicose veins, the largest measures 5.2 mm off the distal thigh GSV with a reflux time of 7440 milliseconds. The AASV is competent. The Giacomini vein is competent. The SSV is competent. Perforators: there is no evidence of incompetent  veins at " "any level.  LEFT:  The deep veins demonstrate phasic flow, compress and respond to augmentations.  There is no DVT.  The common femoral and femoral veins are incompetent and free of thrombus. The GSV demonstrates phasic flow, compresses and responds to augmentations from the saphenofemoral junction to the ankle with no evidence of thrombus. The greater saphenous vein measures 5.6 mm at the saphenofemoral junction and 5.4 mm distally. The GSV is incompetent from mid thigh to distal calf, with the greatest reflux time of 4306 milliseconds. The GSV gives rise to multiple incompetent varicose veins, the largest measures 4.7 mm off the prox calf GSV  with a reflux time of 1864 milliseconds. The AASV is competent. The Giacomini vein is competent. The SSV is competent.  Perforators: there is no evidence of incompetent  veins at any level.     FINAL SUMMARY:  1.         No evidence of bilateral lower extremity deep vein thrombus.  2.         There is bilateral lower extremity deep vein reflux.  3.         Right greater saphenous vein incompetence.  3.         Left greater saphenous vein incompetence.  4.         The time of incompetence is greater than 500 milliseconds in length\"         Assessment & Plan   Janay Tam is a 40 year old female who presents with complaints of bilateral varicose veins, symptomatic with occasional skin warmth on the left leg.        Ms. Tam has been wearing compression stockings (knee-high, 20-30 mmHg) since  8/22/20    She does have a component of bilateral deep venous insufficiency, and I reiterated that compression stockings will be beneficial indefinitely in the future to prevent recurrent symptoms of venous insufficiency. I also explained that they are generally most effective during the day, when she is up and walking around, as part of their effect is to help counteract gravity on venous pooling.     She is essentially asymptomatic at this time. I explained that we " could proceed with varicose vein treatment, but this would be cosmetic in nature and not covered by insurance.     I reviewed with her that varicose veins would not be detrimental to her health, but could have complications of bleeding or SVT. I also explained that deep venous insufficiency does increase her potential for future varicose vein formation.     Reviewed symptoms of leg aching, heaviness, discomfort, swelling, and skin changes, and explained that she should return if these develop.     Follow-up as needed in future.     Cathy Frances

## 2021-02-10 NOTE — PROGRESS NOTES
SUBJECTIVE:                                                   Janay Tam is a 40 year old female who presents to clinic today for the following health issue(s):  Patient presents with:  Vaginal Problem          HPI:  Did mammogram in Sept.  Last pap 2017 pap only.   Remote hx abnormal 20yrs    Felt she had yeast infection end of , self treated. Period 21. Had had discharge as well. 0  Has noticed 1wk before period has vaginal irritation. Itchy, has to use aquaphor. After intercouse too.  Some night sweats too.     Has noticed vaginal dryness with intercourse. Tried lubricant but seems to make it worse.     Reviewed epic labs -vaginal infection tests and paps  Completed review of PMH, SocHx, SurHx, FHx, medications, and care everywhere reviewed. Epic updated.        Patient's last menstrual period was 2021 (exact date)..     Patient is sexually active, .  Using vasectomy for contraception.    reports that she has never smoked. She has never used smokeless tobacco.    STD testing offered?  Declined    Health maintenance updated:  no    Today's PHQ-2 Score:   PHQ-2 (  [employer]) 2016   Q1: Little interest or pleasure in doing things 0   Q2: Feeling down, depressed or hopeless 0   PHQ-2 Score 0     Today's PHQ-9 Score:   PHQ-9 SCORE 2020   PHQ-9 Total Score -   PHQ-9 Total Score MyChart 4 (Minimal depression)   PHQ-9 Total Score 4     Today's MILA-7 Score:   MILA-7 SCORE 2020   Total Score 10 (moderate anxiety)   Total Score 10       Problem list and histories reviewed & adjusted, as indicated.  Additional history: as documented.    Patient Active Problem List   Diagnosis     Allergic rhinitis     CARDIOVASCULAR SCREENING; LDL GOAL LESS THAN 160     Anxiety     Gastroesophageal reflux disease without esophagitis     Stress incontinence in female     Past Surgical History:   Procedure Laterality Date     C IUD,MIRENA  2010     CYSTOSCOPY, SLING TRANSOBTURATOR N/A 2020  "   Procedure: MIDURETHRAL SLING WITH DIAGNOSTIC CYSTOSCOPY;  Surgeon: Sanjeev Pozo MD;  Location:  OR      Social History     Tobacco Use     Smoking status: Never Smoker     Smokeless tobacco: Never Used   Substance Use Topics     Alcohol use: Yes     Alcohol/week: 0.0 standard drinks     Comment: very rare      Problem (# of Occurrences) Relation (Name,Age of Onset)    Arthritis (1) Paternal Grandmother    C.A.D. (2) Father: heart problems, Maternal Grandfather: heart problems    Cerebrovascular Disease (2) Father, Paternal Grandmother    Depression (1) Mother    Diabetes (1) Maternal Grandfather    Hyperlipidemia (1) Mother    Hypertension (1) Mother    Neurologic Disorder (2) Sister: mental health, Brother: mental health    Osteoporosis (1) Paternal Grandmother    Psychotic Disorder (1) Mother    Skin Cancer (1) Sister    Thyroid Disease (2) Mother, Sister            Current Outpatient Medications   Medication Sig     famotidine (PEPCID) 10 MG tablet Take 10 mg by mouth as needed     MULTIVITAMINS OR TABS ONE DAILY     Omega-3 Fatty Acids (FISH OIL PO)      No current facility-administered medications for this visit.      Allergies   Allergen Reactions     Penicillins        ROS:  12 point review of systems negative other than symptoms noted below or in the HPI.        OBJECTIVE:     /54   Ht 1.626 m (5' 4\")   Wt 67.3 kg (148 lb 6.4 oz)   LMP 02/02/2021 (Exact Date)   Breastfeeding No   BMI 25.47 kg/m    Body mass index is 25.47 kg/m .    Exam:  Constitutional:  Appearance: Well nourished, well developed alert, in no acute distress  Skin: General Inspection:  No rashes present, no lesions present, no areas of discoloration.  Neurologic:  Mental Status:  Oriented X3.  Normal strength and tone, sensory exam grossly normal, mentation intact and speech normal.    Psychiatric:  Mentation appears normal and affect normal/bright.  Pelvic Exam:  External Genitalia:     Normal appearance for age, no " discharge present, no tenderness present, no inflammatory lesions present, color normal  Vagina:     Normal vaginal vault without central or paravaginal defects, no discharge present, no inflammatory lesions present, no masses present  Bladder:     Nontender to palpation  Urethra:   Urethral Body:  Urethra palpation normal, urethra structural support normal   Urethral Meatus:  No erythema or lesions present  Cervix:     Appearance healthy, no lesions present, nontender to palpation, no bleeding present  Perineum:     Perineum within normal limits, no evidence of trauma, no rashes or skin lesions present  Anus:     Anus within normal limits, no hemorrhoids present  Inguinal Lymph Nodes:     No lymphadenopathy present  Pubic Hair:     Normal pubic hair distribution for age  Genitalia and Groin:     No rashes present, no lesions present, no areas of discoloration, no masses present         ASSESSMENT/PLAN:                                                        ICD-10-CM    1. Vaginal discharge  N89.8 Gram stain   2. Cervical cancer screening  Z12.4 Pap imaged thin layer screen with HPV - recommended age 30 - 65 years (select HPV order below)     HPV High Risk Types DNA Cervical       -Gram stain obtained. Will notify of results  -pap/hpv obtained, was due. Utd on other health maintenance  -Discussed vaginal moisturization, lubricants, skin protectants. May keep with aquaphor or try desitin.   Can consider vaginal estrogen.  F/u as needed    (25 minutes was spent on the date of the encounter doing chart review, review of outside records, review and interpretation of pertinent test results, history and exam, documentation, patient counseling.)    Becky Corrigan Masters, DO  [facility] [place]

## 2021-02-11 ENCOUNTER — OFFICE VISIT (OUTPATIENT)
Dept: OBGYN | Facility: CLINIC | Age: 41
End: 2021-02-11
Payer: COMMERCIAL

## 2021-02-11 VITALS
SYSTOLIC BLOOD PRESSURE: 106 MMHG | HEIGHT: 64 IN | BODY MASS INDEX: 25.33 KG/M2 | WEIGHT: 148.4 LBS | DIASTOLIC BLOOD PRESSURE: 54 MMHG

## 2021-02-11 DIAGNOSIS — Z12.4 CERVICAL CANCER SCREENING: ICD-10-CM

## 2021-02-11 DIAGNOSIS — N89.8 VAGINAL DISCHARGE: ICD-10-CM

## 2021-02-11 DIAGNOSIS — N89.8 VAGINAL IRRITATION: Primary | ICD-10-CM

## 2021-02-11 LAB
GRAM STN SPEC: NORMAL
Lab: NORMAL
SPECIMEN SOURCE: NORMAL

## 2021-02-11 PROCEDURE — G0145 SCR C/V CYTO,THINLAYER,RESCR: HCPCS | Performed by: OBSTETRICS & GYNECOLOGY

## 2021-02-11 PROCEDURE — 99213 OFFICE O/P EST LOW 20 MIN: CPT | Performed by: OBSTETRICS & GYNECOLOGY

## 2021-02-11 PROCEDURE — 87205 SMEAR GRAM STAIN: CPT | Performed by: OBSTETRICS & GYNECOLOGY

## 2021-02-11 PROCEDURE — 87624 HPV HI-RISK TYP POOLED RSLT: CPT | Performed by: OBSTETRICS & GYNECOLOGY

## 2021-02-11 ASSESSMENT — MIFFLIN-ST. JEOR: SCORE: 1323.14

## 2021-02-16 LAB
COPATH REPORT: NORMAL
PAP: NORMAL

## 2021-02-18 LAB
FINAL DIAGNOSIS: NORMAL
HPV HR 12 DNA CVX QL NAA+PROBE: NEGATIVE
HPV16 DNA SPEC QL NAA+PROBE: NEGATIVE
HPV18 DNA SPEC QL NAA+PROBE: NEGATIVE
SPECIMEN DESCRIPTION: NORMAL
SPECIMEN SOURCE CVX/VAG CYTO: NORMAL

## 2021-04-02 ENCOUNTER — OFFICE VISIT (OUTPATIENT)
Dept: FAMILY MEDICINE | Facility: CLINIC | Age: 41
End: 2021-04-02
Payer: COMMERCIAL

## 2021-04-02 VITALS — SYSTOLIC BLOOD PRESSURE: 122 MMHG | DIASTOLIC BLOOD PRESSURE: 68 MMHG

## 2021-04-02 DIAGNOSIS — L82.0 INFLAMED SEBORRHEIC KERATOSIS: ICD-10-CM

## 2021-04-02 DIAGNOSIS — B07.9 VIRAL WARTS, UNSPECIFIED TYPE: ICD-10-CM

## 2021-04-02 DIAGNOSIS — L81.4 LENTIGINES: Primary | ICD-10-CM

## 2021-04-02 PROCEDURE — 17110 DESTRUCTION B9 LES UP TO 14: CPT | Performed by: PHYSICIAN ASSISTANT

## 2021-04-02 PROCEDURE — 99213 OFFICE O/P EST LOW 20 MIN: CPT | Mod: 25 | Performed by: PHYSICIAN ASSISTANT

## 2021-04-02 NOTE — PROGRESS NOTES
HPI:  Janay Tam is a 40 year old female patient here today for warts on legs .  Patient states this has been present for a while.  Patient reports the following symptoms: bothersome .  Patient reports the following previous treatments: none.  Patient reports the following modifying factors: none.  Associated symptoms: none Also, has an irritated spot on left cheek for a while. No tx tried. .  Patient has no other skin complaints today.  Remainder of the HPI, Meds, PMH, Allergies, FH, and SH was reviewed in chart.    Pertinent Hx:   No personal history of skin cancer. Sister had a skin cancer.       Past Medical History:   Diagnosis Date     Allergic rhinitis, cause unspecified     Allergic rhinitis     Cytomegaloviral disease (H) 2010    Second child with hearing loss. 6/14 IgM neg, IgG pos     Depressive disorder, not elsewhere classified     use effexor in the past - more seasonal     Generalized anxiety disorder      GERD (gastroesophageal reflux disease)      Papanicolaou smear of cervix with atypical squamous cells of undetermined significance (ASC-US) 2001    Unsure of outcome     Postpartum depression     with first delivery, not currently medicated       Past Surgical History:   Procedure Laterality Date     C IUD,MIRENA  12/2010     CYSTOSCOPY, SLING TRANSOBTURATOR N/A 8/5/2020    Procedure: MIDURETHRAL SLING WITH DIAGNOSTIC CYSTOSCOPY;  Surgeon: Sanjeev Pozo MD;  Location:  OR        Family History   Problem Relation Age of Onset     C.A.D. Father         heart problems     Cerebrovascular Disease Father      C.A.D. Maternal Grandfather         heart problems     Diabetes Maternal Grandfather      Hypertension Mother      Depression Mother      Psychotic Disorder Mother      Hyperlipidemia Mother      Thyroid Disease Mother      Cerebrovascular Disease Paternal Grandmother      Osteoporosis Paternal Grandmother      Arthritis Paternal Grandmother      Neurologic Disorder Sister          mental health     Skin Cancer Sister      Neurologic Disorder Brother         mental health     Thyroid Disease Sister        Social History     Socioeconomic History     Marital status:      Spouse name: Not on file     Number of children: 3     Years of education: Not on file     Highest education level: Not on file   Occupational History     Employer: [employer]OMEM[employer]ER   Social Needs     Financial resource strain: Not on file     Food insecurity     Worry: Not on file     Inability: Not on file     Transportation needs     Medical: Not on file     Non-medical: Not on file   Tobacco Use     Smoking status: Never Smoker     Smokeless tobacco: Never Used   Substance and Sexual Activity     Alcohol use: Yes     Alcohol/week: 0.0 standard drinks     Comment: very rare     Drug use: No     Sexual activity: Yes     Partners: Male     Birth control/protection: Male Surgical     Comment: vasectomy   Lifestyle     Physical activity     Days per week: Not on file     Minutes per session: Not on file     Stress: Not on file   Relationships     Social connections     Talks on phone: Not on file     Gets together: Not on file     Attends Rastafari service: Not on file     Active member of club or organization: Not on file     Attends meetings of clubs or organizations: Not on file     Relationship status: Not on file     Intimate partner violence     Fear of current or ex partner: Not on file     Emotionally abused: Not on file     Physically abused: Not on file     Forced sexual activity: Not on file   Other Topics Concern     Parent/sibling w/ CABG, MI or angioplasty before 65F 55M? Not Asked   Social History Narrative    Social Documentation:10/21/09        Balanced Diet: YES    Calcium intake: 2 per day    Caffeine: 1-2 cup per day    Exercise:  type of activity walking, lift wts ;  7 times per week    Sunscreen: Yes     Seatbelts:  Yes    Self Breast Exam:  Yes  sometimes    Self Testicular Exam: No - n/a     Physical/Emotional/Sexual Abuse: No    Do you feel safe in your environment? Yes        Cholesterol screen up to date:  No    Eye Exam up to date: Yes    Dental Exam up to date: Yes    Pap smear up to date: Yes    Mammogram up to date: Does Not Apply    Dexa Scan up to date: Does Not Apply    Colonoscopy up to date: Does Not Apply    Immunizations up to date: Yes td 2000    Glucose screen if over 40:   N/a        Eleanor Perdue [facility]            Outpatient Encounter Medications as of 4/2/2021   Medication Sig Dispense Refill     famotidine (PEPCID) 10 MG tablet Take 10 mg by mouth as needed       MULTIVITAMINS OR TABS ONE DAILY 100 1 YEAR     Omega-3 Fatty Acids (FISH OIL PO)        No facility-administered encounter medications on file as of 4/2/2021.        Review Of Systems:  Skin: spots  Eyes: negative  Ears/Nose/Throat: negative  Respiratory: No shortness of breath, dyspnea on exertion, cough, or hemoptysis  Cardiovascular: negative  Gastrointestinal: negative  Genitourinary: negative  Musculoskeletal: negative  Neurologic: negative  Psychiatric: negative  Hematologic/Lymphatic/Immunologic: negative  Endocrine: negative      Objective:     /68   Eyes: Conjunctivae/lids: Normal   ENT: Lips:  Normal  MSK: Normal  Cardiovascular: Peripheral edema none  Pulm: Breathing Normal  Neuro/Psych: Orientation: A/O x 3. Normal; Mood/Affect: Normal, NAD, WDWN  Pt accompanied by: self  Following areas examined: face, left cheek ( pt wearing mask), neck, ventral legs  Malcolm skin type:ii   Findings:  Inflamed brown, stuck-on scaly appearing papules on left cheek  Flesh-colored verrucous appearing papule on right and left leg  Fontana WD smooth macules on face      Assessment and Plan:     1)Lentigines     I discussed the specifics of tumor, prognosis, and genetics of benign lesions.  I explained that treatment of these lesions would be purely cosmetic and not medically neccessary.  I discussed with patient different  removal options including excision, cryotherapy, cautery and /or laser.  Lesion may recur and/or may not completely resolve. May need additional treatment.     2) ISK x 1 on left cheek  Benign etiology and course of lesion.  LN2: Treated with LN2 for 5s for 1-2 cycles. Warned risks of blistering, pain, pigment change, scarring, and incomplete resolution.  Advised patient to return if lesions do not completely resolve within 2-3 months.  Wound care sheet given.  3) wart x 2  Disc etiology and course  LN2: Treated with LN2 for 5s for 1-2 cycles. Warned risks of blistering, pain, pigment change, scarring, and incomplete resolution.  Advised patient to return if lesions do not completely resolve within 2-3 months.  Wound care sheet given.    Signs and Symptoms of non-melanoma skin cancer and ABCDEs. Patient was asked about new or changing moles/lesions on body.   Wear a sunscreen with at least SPF 30 on your face, ears, neck and V of the chest daily. Wear sunscreen on other areas of the body if those areas are exposed to the sun throughout the day. Sunscreens can contain physical and/or chemical blockers. Physical blockers are less likely to clog pores, these include zinc oxide and titanium dioxide. Reapply every two hour and after swimming. Sunscreen examples include Neutrogena, CeraVe, Blue Lizard, Elta MD and many others.    Proper skin care from [facility]:    -Eliminate harsh soaps as they strip the natural oils from the skin, often resulting in dry itchy skin ( i.e. Dial, Zest, [place])  -Use mild soaps such as Cetaphil or Dove Sensitive Skin in the shower. You do not need to use soap on arms, legs, and trunk every time you shower unless visibly soiled.   -Avoid hot or cold showers.  -After showering, lightly dry off and apply moisturizing within 2-3 minutes. This will help trap moisture in the skin.   -Aggressive use of a moisturizer at least 1-2 times a day to the entire body (including  -Vanicream, Cetaphil, Aquaphor or Cerave) and moisturize hands after every washing.  -We recommend using moisturizers that come in a tub that needs to be scooped out, not a pump. This has more of an oil base. It will hold moisture in your skin much better than a water base moisturizer. The above recommended are non-pore clogging.         It was a pleasure speaking with Janay Tam today.       Follow up in prn

## 2021-04-02 NOTE — PATIENT INSTRUCTIONS
WOUND CARE INSTRUCTIONS  FOR CRYOSURGERY  Both Legs        This area treated with liquid nitrogen will form a blister. You do not need to bandage the area until after the blister forms and breaks (which may be a few days).  When the blister breaks, begin daily dressing changes as follows:    1) Clean and dry the area with tap water using clean Q-tip or sterile gauze pad.    2) Apply Polysporin ointment or Bacitracin ointment over entire wound.  Do NOT use Neosporin ointment.    3) Cover the wound with a band-aid or sterile non-stick gauze pad and micropore paper tape.      REPEAT THESE INSTRUCTIONS AT LEAST ONCE A DAY UNTIL THE WOUND HAS COMPLETELY HEALED.        It is an old wives tale that a wound heals better when it is exposed to air and allowed to dry out. The wound will heal faster with a better cosmetic result if it is kept moist with ointment and covered with a bandage.  Do not let the wound dry out.      Supplies Needed:     *Cotton tipped applicators (Q-tips)   *Polysporin ointment or Bacitracin ointment (NOT NEOSPORIN)   *Band-aids, or non stick gauze pads and micropore paper tape    PATIENT INFORMATION    During the healing process you will notice a number of changes. All wounds develop a small halo of redness surrounding the wound.  This means healing is occurring. Severe itching with extensive redness usually indicates sensitivity to the ointment or bandage tape used to dress the wound.  You should call our office if this develops.      Swelling and/or discoloration around your surgical site is common, particularly when performed around the eye.    All wounds normally drain.  The larger the wound the more drainage there will be.  After 7-10 days, you will notice the wound beginning to shrink and new skin will begin to grow.  The wound is healed when you can see skin has formed over the entire area.  A healed wound has a healthy, shiny look to the surface and is red to dark pink in color to normalize.   Wounds may take approximately 4-6 weeks to heal.  Larger wounds may take 6-8 weeks.  After the wound is healed you may discontinue dressing changes.    You may experience a sensation of tightness as your wound heals. This is normal and will gradually subside.    Your healed wound may be sensitive to temperature changes. This sensitivity improves with time, but if you re having a lot of discomfort, try to avoid temperature extremes.    Patients frequently experience itching after their wound appears to have healed because of the continue healing under the skin.  Plain Vaseline will help relieve the itching.         WARTS  Warts are caused by the Human Papilloma Virus.They are commonly spread by direct contact or autoinoculation. That mean if the wart is picked at it could spread to another area of skin.   In children without treatment 50% of warts will disappear spontaneous in 6 months, 90% are gone in 2 years. In adults they can last for a longer period of time, but they can resolve without treatment.   No method of treatment is better than the other. You just have to be consistent with your treatments and return every 4-6 weeks.   In between treatments you should use either salicylic acid or mediplast tape:    Mediplast tape: Cut to size of wart, leave tape on for 1 week, (Put duct tape over it to secure it). In 1 week, pare skin down with a pumice stone and repeat. You want to pare down until you see some pinpoint bleeding. Do this for 6-8 weeks, if no improvement, make follow up appt.     Wart stick can be purchased online. (Twice a day, warts turn white, then pare down with a pumice stone and repeat) Do this for 6-8 weeks, if no improvement, make a follow up appt.       WART TREATMENTS    Wart(s), or verruca vulgaris  are a very common, benign skin condition caused by a virus.  Since warts are caused by a viral infection, your own immune system will typically clear the lesion on average from several months to 2  years. Although wart(s) do not easily spread to others, the virus may still pass through direct contact (picking or scratching) and/or indirect contact (locker rooms/public showers).     It is important to remember that there is no cure for the wart virus. This means that warts can return at the same site or appear in a new spot.    Sometimes, it seems that new warts appear as fast as old ones go away. This happens when the old warts shed virus cells into the skin before the warts are treated. This allows new warts to grow around the first warts. The best way to prevent this is to have your dermatologist treat new warts as soon as they appear.    There are several technique/methods to making the wart(s) smaller in size or to help stimulate your immune system to clear the wart(s). The mainstay of treatment of wart(s) is to destroy the infected skin cells from the virus and to prevent recurrence.  The most important thing to remember is that regular consistent treatment is the most effective way to get rid of the wart.    Salicylic acid & Debridement   The first line treatment of warts is application of salicylic acid (with or without duct tape occlusion). Application of salicylic acid allows the wart(s) to stay flat and not callused. This allows other topical treatments to work better.      We recommend Wart Stick or Mediplast Tape over-the-counter   Directions: Apply the Wart Stick to the site twice daily or apply a piece of the Mediplast tape to the wart and cover tightly with tape to occlude the lesion.   Keep the medication on for 24 hours before removing/changing. Do this foe one week continuously.      After one week, when the salicylic acid is removed from the affected area the wart(s) area should turn the skin white/softened. Use an emery board/paring tool to rub off the softened tissue before changing a new salicylic acid application. Make sure you dispose the emery board and do NOT reuse on another site.      Cryotherapy or Freezing   Freezing wart(s) with a very cold substance (liquid nitrogen) is an effective treatment for common warts. The wart(s) are frozen off to kill the viral activity in and around the affected area. The treated areas will become sore and/or red for the next few hours. Some adverse reactions of this treatment include: pain, blisters, blood blisters, infection, and/or lightening or darkening of the skin.   At times, when the wart is too thick and/or callused, the clinician will shave/pare down the thickened skin prior to spraying the wart(s) with liquid nitrogen.     Aldara  Cream   Aldara  Cream is a topical medication that activates your own immune system to help attack the cells infected with the virus. The cream works for resistant or recurrent warts that do not respond to freezing and/or salicylic acid.   Directions: Open the medication packet & apply to the affected area. Cover the lesion with a band-aid. The next morning, wash off the area with soap & water.  If the cream is going to work the wart(s) should get red and irritated.      Cantharidin (Canthacur/Cantharone)   Cantharidin is a chemical compound derived from a blister beetle. This liquid medication is applied to wart(s) in order to cause blistering, thus destroying the affected areas. At your  visit, application of cantharidin to the wart(s) is usually painless and the applied areas dry clear. Three to four hours after cantharidin is applied to the warts, you must wash off the area(s) with soap and water. Adverse reactions such as redness, tenderness, blistering, itching and burning sensations may occur within 2-3 days. It is expected to take 2-4 weeks for the treated areas to heal. Please follow up your provider in 6 weeks to reassess the treated areas.     Electrosurgery and curettage  Electrosurgery (burning) and Curettage (scraping off) of the wart(s) are often are used together. The dermatologist may remove the wart by  "scraping it off before or after electrosurgery. Some adverse reactions of this treatment include: pain, scarring, infection, and/or lightening or darkening of the skin.    Excision  Cutting out the wart is another option for wart treatment.  Some adverse reactions of this treatment include: pain, scarring, infection, and/or lightening or darkening of the skin.    TREATMENT RESISTANT WARTS    If the warts are hard-to-treat, we may use one of the following treatments:     Laser treatment  Laser treatment is an option, mainly for warts that have not responded to other therapies. Some adverse reactions of this treatment include: pain, scarring, infection, and/or lightening or darkening of the skin.    Chemical peels  When flat warts appear, there are usually many warts. Because so many warts appear, dermatologists often prescribe \"peeling\" methods to treat these warts. This means, either the doctor or you will apply a peeling medicine every couple weeks or at home every day. Peeling medicines include salicylic acid (stronger than you can buy at the store), tretinoin, and glycolic acid.Some adverse reactions of this treatment include: pain, scarring, infection, and/or lightening or darkening of the skin.    Bleomycin  The wart may be injected with an anti-cancer medicine, bleomycin. The shots may hurt. Some adverse reactions of this treatment include: pain, scarring, infection, and/or lightening or darkening of the skin, or nail loss if given in the fingers.    Immunotherapy  This treatment uses the patient s own immune system to fight the warts. This treatment is used when the warts remain despite other treatments. There are two types:     i) one type of immunotherapy involves applying a chemical, such as diphencyprone (DCP), to the warts. A mild allergic reaction occurs around the treated warts. This reaction may cause the warts to go away.     ii) Another type of immunotherapy involves getting shots of Shannan antigen. "  The shots can boost   Proper skin care from [facility]:    -Eliminate harsh soaps as they strip the natural oils from the skin, often resulting in dry itchy skin ( i.e. Dial, Zest, [place] Spring)  -Use mild soaps such as Cetaphil or Dove Sensitive Skin in the shower. You do not need to use soap on arms, legs, and trunk every time you shower unless visibly soiled.   -Avoid hot or cold showers.  -After showering, lightly dry off and apply moisturizing within 2-3 minutes. This will help trap moisture in the skin.   -Aggressive use of a moisturizer at least 1-2 times a day to the entire body (including -Vanicream, Cetaphil, Aquaphor or Cerave) and moisturize hands after every washing.  -We recommend using moisturizers that come in a tub that needs to be scooped out, not a pump. This has more of an oil base. It will hold moisture in your skin much better than a water base moisturizer. The above recommended are non-pore clogging.      Wear a sunscreen with at least SPF 30 on your face, ears, neck and V of the chest daily. Wear sunscreen on other areas of the body if those areas are exposed to the sun throughout the day. Sunscreens can contain physical and/or chemical blockers. Physical blockers are less likely to clog pores, these include zinc oxide and titanium dioxide. Reapply every two hour and after swimming. Sunscreen examples include Neutrogena, CeraVe, Blue Layo, Elta MD and many others.    UV radiation  UVA radiation remains constant throughout the day and throughout the year. It is a longer wavelength than UVB and therefore penetrates deeper into the skin leading to immediate and delayed tanning, photoaging, and skin cancer. 70-80% of UVA and UVB radiation occurs between the hours of 10am-2pm.  UVB radiation  UVB radiation causes the most harmful effects and is more significant during the summer months. However, snow and ice can reflect UVB radiation leading to skin damage during the winter months as  well. UVB radiation is responsible for tanning, burning, inflammation, delayed erythema (pinkness), pigmentation (brown spots), and skin cancer.     I recommend self monthly full body exams and yearly full body exams with a dermatology provider. If you develop a new or changing lesion please follow up for examination. Most skin cancers are pink and scaly or pink and pearly. However, we do see blue/brown/black skin cancers.  Consider the ABCDEs of melanoma when giving yourself your monthly full body exam ( don't forget the groin, buttocks, feet, toes, etc). A-asymmetry, B-borders, C-color, D-diameter, E-elevation or evolving. If you see any of these changes please follow up in clinic. If you cannot see your back I recommend purchasing a hand held mirror to use with a larger wall mirror.          WOUND CARE INSTRUCTIONS  FOR CRYOSURGERY        This area treated with liquid nitrogen will form a blister. You do not need to bandage the area until after the blister forms and breaks (which may be a few days).  When the blister breaks, begin daily dressing changes as follows:    1) Clean and dry the area with tap water using clean Q-tip or sterile gauze pad.    2) Apply Polysporin ointment or Bacitracin ointment over entire wound.  Do NOT use Neosporin ointment.    3) Cover the wound with a band-aid or sterile non-stick gauze pad and micropore paper tape.      REPEAT THESE INSTRUCTIONS AT LEAST ONCE A DAY UNTIL THE WOUND HAS COMPLETELY HEALED.        It is an old wives tale that a wound heals better when it is exposed to air and allowed to dry out. The wound will heal faster with a better cosmetic result if it is kept moist with ointment and covered with a bandage.  Do not let the wound dry out.      Supplies Needed:     *Cotton tipped applicators (Q-tips)   *Polysporin ointment or Bacitracin ointment (NOT NEOSPORIN)   *Band-aids, or non stick gauze pads and micropore paper tape    PATIENT INFORMATION    During the healing  process you will notice a number of changes. All wounds develop a small halo of redness surrounding the wound.  This means healing is occurring. Severe itching with extensive redness usually indicates sensitivity to the ointment or bandage tape used to dress the wound.  You should call our office if this develops.      Swelling and/or discoloration around your surgical site is common, particularly when performed around the eye.    All wounds normally drain.  The larger the wound the more drainage there will be.  After 7-10 days, you will notice the wound beginning to shrink and new skin will begin to grow.  The wound is healed when you can see skin has formed over the entire area.  A healed wound has a healthy, shiny look to the surface and is red to dark pink in color to normalize.  Wounds may take approximately 4-6 weeks to heal.  Larger wounds may take 6-8 weeks.  After the wound is healed you may discontinue dressing changes.    You may experience a sensation of tightness as your wound heals. This is normal and will gradually subside.    Your healed wound may be sensitive to temperature changes. This sensitivity improves with time, but if you re having a lot of discomfort, try to avoid temperature extremes.    Patients frequently experience itching after their wound appears to have healed because of the continue healing under the skin.  Plain Vaseline will help relieve the itching.

## 2021-04-02 NOTE — LETTER
4/2/2021         RE: Janay Tam  [address] [place] [address]        Dear Colleague,    Thank you for referring your patient, Janay Tam, to the [facility]. Please see a copy of my visit note below.    HPI:  Janay Tam is a 40 year old female patient here today for warts on legs .  Patient states this has been present for a while.  Patient reports the following symptoms: bothersome .  Patient reports the following previous treatments: none.  Patient reports the following modifying factors: none.  Associated symptoms: none Also, has an irritated spot on left cheek for a while. No tx tried. .  Patient has no other skin complaints today.  Remainder of the HPI, Meds, PMH, Allergies, FH, and SH was reviewed in chart.    Pertinent Hx:   No personal history of skin cancer. Sister had a skin cancer.       Past Medical History:   Diagnosis Date     Allergic rhinitis, cause unspecified     Allergic rhinitis     Cytomegaloviral disease (H) 2010    Second child with hearing loss. 6/14 IgM neg, IgG pos     Depressive disorder, not elsewhere classified     use effexor in the past - more seasonal     Generalized anxiety disorder      GERD (gastroesophageal reflux disease)      Papanicolaou smear of cervix with atypical squamous cells of undetermined significance (ASC-US) 2001    Unsure of outcome     Postpartum depression     with first delivery, not currently medicated       Past Surgical History:   Procedure Laterality Date     C IUD,MIRENA  12/2010     CYSTOSCOPY, SLING TRANSOBTURATOR N/A 8/5/2020    Procedure: MIDURETHRAL SLING WITH DIAGNOSTIC CYSTOSCOPY;  Surgeon: Sanjeev Pzoo MD;  Location:  OR        Family History   Problem Relation Age of Onset     C.A.D. Father         heart problems     Cerebrovascular Disease Father      C.A.D. Maternal Grandfather         heart problems     Diabetes Maternal Grandfather      Hypertension Mother       Depression Mother      Psychotic Disorder Mother      Hyperlipidemia Mother      Thyroid Disease Mother      Cerebrovascular Disease Paternal Grandmother      Osteoporosis Paternal Grandmother      Arthritis Paternal Grandmother      Neurologic Disorder Sister         mental health     Skin Cancer Sister      Neurologic Disorder Brother         mental health     Thyroid Disease Sister        Social History     Socioeconomic History     Marital status:      Spouse name: Not on file     Number of children: 3     Years of education: Not on file     Highest education level: Not on file   Occupational History     Employer: [employer]   Social Needs     Financial resource strain: Not on file     Food insecurity     Worry: Not on file     Inability: Not on file     Transportation needs     Medical: Not on file     Non-medical: Not on file   Tobacco Use     Smoking status: Never Smoker     Smokeless tobacco: Never Used   Substance and Sexual Activity     Alcohol use: Yes     Alcohol/week: 0.0 standard drinks     Comment: very rare     Drug use: No     Sexual activity: Yes     Partners: Male     Birth control/protection: Male Surgical     Comment: vasectomy   Lifestyle     Physical activity     Days per week: Not on file     Minutes per session: Not on file     Stress: Not on file   Relationships     Social connections     Talks on phone: Not on file     Gets together: Not on file     Attends Orthodoxy service: Not on file     Active member of club or organization: Not on file     Attends meetings of clubs or organizations: Not on file     Relationship status: Not on file     Intimate partner violence     Fear of current or ex partner: Not on file     Emotionally abused: Not on file     Physically abused: Not on file     Forced sexual activity: Not on file   Other Topics Concern     Parent/sibling w/ CABG, MI or angioplasty before 65F 55M? Not Asked   Social History Narrative    Social Documentation:10/21/09         Balanced Diet: YES    Calcium intake: 2 per day    Caffeine: 1-2 cup per day    Exercise:  type of activity walking, lift wts ;  7 times per week    Sunscreen: Yes     Seatbelts:  Yes    Self Breast Exam:  Yes  sometimes    Self Testicular Exam: No - n/a    Physical/Emotional/Sexual Abuse: No    Do you feel safe in your environment? Yes        Cholesterol screen up to date:  No    Eye Exam up to date: Yes    Dental Exam up to date: Yes    Pap smear up to date: Yes    Mammogram up to date: Does Not Apply    Dexa Scan up to date: Does Not Apply    Colonoscopy up to date: Does Not Apply    Immunizations up to date: Yes td 2000    Glucose screen if over 40:   N/a        [facility]            Outpatient Encounter Medications as of 4/2/2021   Medication Sig Dispense Refill     famotidine (PEPCID) 10 MG tablet Take 10 mg by mouth as needed       MULTIVITAMINS OR TABS ONE DAILY 100 1 YEAR     Omega-3 Fatty Acids (FISH OIL PO)        No facility-administered encounter medications on file as of 4/2/2021.        Review Of Systems:  Skin: spots  Eyes: negative  Ears/Nose/Throat: negative  Respiratory: No shortness of breath, dyspnea on exertion, cough, or hemoptysis  Cardiovascular: negative  Gastrointestinal: negative  Genitourinary: negative  Musculoskeletal: negative  Neurologic: negative  Psychiatric: negative  Hematologic/Lymphatic/Immunologic: negative  Endocrine: negative      Objective:     /68   Eyes: Conjunctivae/lids: Normal   ENT: Lips:  Normal  MSK: Normal  Cardiovascular: Peripheral edema none  Pulm: Breathing Normal  Neuro/Psych: Orientation: A/O x 3. Normal; Mood/Affect: Normal, NAD, WDWN  Pt accompanied by: self  Following areas examined: face, left cheek ( pt wearing mask), neck, ventral legs  Malcolm skin type:ii   Findings:  Inflamed brown, stuck-on scaly appearing papules on left cheek  Flesh-colored verrucous appearing papule on right and left leg  Fontana WD smooth macules on  face      Assessment and Plan:     1)Lentigines     I discussed the specifics of tumor, prognosis, and genetics of benign lesions.  I explained that treatment of these lesions would be purely cosmetic and not medically neccessary.  I discussed with patient different removal options including excision, cryotherapy, cautery and /or laser.  Lesion may recur and/or may not completely resolve. May need additional treatment.     2) ISK x 1 on left cheek  Benign etiology and course of lesion.  LN2: Treated with LN2 for 5s for 1-2 cycles. Warned risks of blistering, pain, pigment change, scarring, and incomplete resolution.  Advised patient to return if lesions do not completely resolve within 2-3 months.  Wound care sheet given.  3) wart x 2  Disc etiology and course  LN2: Treated with LN2 for 5s for 1-2 cycles. Warned risks of blistering, pain, pigment change, scarring, and incomplete resolution.  Advised patient to return if lesions do not completely resolve within 2-3 months.  Wound care sheet given.    Signs and Symptoms of non-melanoma skin cancer and ABCDEs. Patient was asked about new or changing moles/lesions on body.   Wear a sunscreen with at least SPF 30 on your face, ears, neck and V of the chest daily. Wear sunscreen on other areas of the body if those areas are exposed to the sun throughout the day. Sunscreens can contain physical and/or chemical blockers. Physical blockers are less likely to clog pores, these include zinc oxide and titanium dioxide. Reapply every two hour and after swimming. Sunscreen examples include Neutrogena, CeraVe, Blue Lizard, Elta MD and many others.    Proper skin care from [facility]:    -Eliminate harsh soaps as they strip the natural oils from the skin, often resulting in dry itchy skin ( i.e. Dial, Zest, [place] Spring)  -Use mild soaps such as Cetaphil or Dove Sensitive Skin in the shower. You do not need to use soap on arms, legs, and trunk every time you shower unless  visibly soiled.   -Avoid hot or cold showers.  -After showering, lightly dry off and apply moisturizing within 2-3 minutes. This will help trap moisture in the skin.   -Aggressive use of a moisturizer at least 1-2 times a day to the entire body (including -Vanicream, Cetaphil, Aquaphor or Cerave) and moisturize hands after every washing.  -We recommend using moisturizers that come in a tub that needs to be scooped out, not a pump. This has more of an oil base. It will hold moisture in your skin much better than a water base moisturizer. The above recommended are non-pore clogging.         It was a pleasure speaking with Janay Tam today.       Follow up in prn        Again, thank you for allowing me to participate in the care of your patient.        Sincerely,        Tamiko Hobbs PA-C

## 2021-06-01 NOTE — PROGRESS NOTES
SUBJECTIVE:                                                   Janay Tam is a 40 year old female who presents to clinic today for the following health issue(s):  Patient presents with:  Vaginal Problem: vaginal irritation, itching, redness-seems to be an ongoing issue. Has been using vagisil cream      HPI: Patient c/o vaginal irritation and itching the week before cycle always.  She states once cycle is here and done all the symptoms clear, but not this time. She sometimes will use aquafor and will help.  She has been using vagisil cream externally this past week.  She uses no soap, discussed trying all cotton underwear sensitive free detergent.        Patient's last menstrual period was 2021..     Patient is sexually active, .  Using vasectomy for contraception.    reports that she has never smoked. She has never used smokeless tobacco.    STD testing offered?  Declined    Health maintenance updated:  yes    Today's PHQ-2 Score:   PHQ-2 (  [employer]) 2021   Q1: Little interest or pleasure in doing things 0   Q2: Feeling down, depressed or hopeless 0   PHQ-2 Score 0     Today's PHQ-9 Score:   PHQ-9 SCORE 2020   PHQ-9 Total Score -   PHQ-9 Total Score MyChart 4 (Minimal depression)   PHQ-9 Total Score 4     Today's MILA-7 Score:   MILA-7 SCORE 2020   Total Score 10 (moderate anxiety)   Total Score 10       Problem list and histories reviewed & adjusted, as indicated.  Additional history: as documented.    Patient Active Problem List   Diagnosis     Allergic rhinitis     CARDIOVASCULAR SCREENING; LDL GOAL LESS THAN 160     Anxiety     Gastroesophageal reflux disease without esophagitis     Stress incontinence in female     Past Surgical History:   Procedure Laterality Date     C IUD,MIRENA  2010     CYSTOSCOPY, SLING TRANSOBTURATOR N/A 2020    Procedure: MIDURETHRAL SLING WITH DIAGNOSTIC CYSTOSCOPY;  Surgeon: Sanjeev Pozo MD;  Location:  OR      Social History  "    Tobacco Use     Smoking status: Never Smoker     Smokeless tobacco: Never Used   Substance Use Topics     Alcohol use: Yes     Alcohol/week: 0.0 standard drinks     Comment: very rare      Problem (# of Occurrences) Relation (Name,Age of Onset)    Arthritis (1) Paternal Grandmother    C.A.D. (2) Father: heart problems, Maternal Grandfather: heart problems    Cerebrovascular Disease (2) Father, Paternal Grandmother    Depression (1) Mother    Diabetes (1) Maternal Grandfather    Hyperlipidemia (1) Mother    Hypertension (1) Mother    Neurologic Disorder (2) Sister: mental health, Brother: mental health    Osteoporosis (1) Paternal Grandmother    Psychotic Disorder (1) Mother    Skin Cancer (1) Sister    Thyroid Disease (2) Mother, Sister            Current Outpatient Medications   Medication Sig     famotidine (PEPCID) 10 MG tablet Take 10 mg by mouth as needed     fluconazole (DIFLUCAN) 150 MG tablet Take 1 tablet (150 mg) by mouth every 3 days     metroNIDAZOLE (METROGEL) 0.75 % vaginal gel Place 1 applicator (5 g) vaginally At Bedtime for 5 days     MULTIVITAMINS OR TABS ONE DAILY     Omega-3 Fatty Acids (FISH OIL PO)      No current facility-administered medications for this visit.      Allergies   Allergen Reactions     Pcn [Penicillins]        ROS:  12 point review of systems negative other than symptoms noted below or in the HPI.  Genitourinary: Vaginal Discharge, Vaginal Itching and vaginal irritation  No urinary frequency or dysuria, bladder or kidney problems, Normal menstrual cycles      OBJECTIVE:     /76   Pulse 72   Ht 1.626 m (5' 4\")   Wt 64.4 kg (142 lb)   LMP 05/25/2021   BMI 24.37 kg/m    Body mass index is 24.37 kg/m .    Exam:  Constitutional:  Appearance: Well nourished, well developed alert, in no acute distress  Neurologic:  Mental Status:  Oriented X3.  Normal strength and tone, sensory exam grossly normal, mentation intact and speech normal.    Psychiatric:  Mentation appears " normal and affect normal/bright.  Pelvic Exam:  External Genitalia:     Normal appearance for age, no discharge present, no tenderness present, no inflammatory lesions present, color normal  Vagina:     Normal vaginal vault without central or paravaginal defects, chunky cream discharge present, no inflammatory lesions present, no masses present  Wet mount was done.  Bladder:     Nontender to palpation  Urethra:   Urethral Body:  Urethra palpation normal, urethra structural support normal   Urethral Meatus:  No erythema or lesions present  Cervix:     Appearance healthy, no lesions present, nontender to palpation, no bleeding present  Perineum:     Perineum within normal limits, no evidence of trauma, no rashes or skin lesions present  Anus:     Anus within normal limits, no hemorrhoids present  Inguinal Lymph Nodes:     No lymphadenopathy present  Pubic Hair:     Normal pubic hair distribution for age  Genitalia and Groin:     No rashes present, no lesions present, no areas of discoloration, no masses present       In-Clinic Test Results:  Results for orders placed or performed in visit on 06/02/21 (from the past 24 hour(s))   Wet prep    Specimen: Vagina   Result Value Ref Range    Specimen Description Vagina     Wet Prep No Trichomonas seen     Wet Prep Rare  Clue cells seen   (A)     Wet Prep Moderate  Yeast seen   (A)     Wet Prep Few  WBC'S seen          ASSESSMENT/PLAN:                                                        ICD-10-CM    1. Itching of vagina  N89.8 Wet prep     fluconazole (DIFLUCAN) 150 MG tablet   2. Bacterial vaginosis  N76.0 metroNIDAZOLE (METROGEL) 0.75 % vaginal gel    B96.89          Patient to start on metrogel and diflucan.  Discussed changing laundry detergent, also she has been using baby wipes in area, to stop that.  Return prn.    OLIVIA Rodriguez CNP  [facility] [place]

## 2021-06-02 ENCOUNTER — OFFICE VISIT (OUTPATIENT)
Dept: OBGYN | Facility: CLINIC | Age: 41
End: 2021-06-02
Payer: COMMERCIAL

## 2021-06-02 VITALS
HEIGHT: 64 IN | HEART RATE: 72 BPM | SYSTOLIC BLOOD PRESSURE: 112 MMHG | WEIGHT: 142 LBS | BODY MASS INDEX: 24.24 KG/M2 | DIASTOLIC BLOOD PRESSURE: 76 MMHG

## 2021-06-02 DIAGNOSIS — B96.89 BACTERIAL VAGINOSIS: ICD-10-CM

## 2021-06-02 DIAGNOSIS — N89.8 ITCHING OF VAGINA: Primary | ICD-10-CM

## 2021-06-02 DIAGNOSIS — N76.0 BACTERIAL VAGINOSIS: ICD-10-CM

## 2021-06-02 LAB
SPECIMEN SOURCE: ABNORMAL
WET PREP SPEC: ABNORMAL

## 2021-06-02 PROCEDURE — 99213 OFFICE O/P EST LOW 20 MIN: CPT | Performed by: NURSE PRACTITIONER

## 2021-06-02 PROCEDURE — 87210 SMEAR WET MOUNT SALINE/INK: CPT | Performed by: NURSE PRACTITIONER

## 2021-06-02 RX ORDER — FLUCONAZOLE 150 MG/1
150 TABLET ORAL
Qty: 2 TABLET | Refills: 0 | Status: SHIPPED | OUTPATIENT
Start: 2021-06-02 | End: 2021-08-06

## 2021-06-02 RX ORDER — METRONIDAZOLE 7.5 MG/G
1 GEL VAGINAL AT BEDTIME
Qty: 70 G | Refills: 0 | Status: SHIPPED | OUTPATIENT
Start: 2021-06-02 | End: 2021-06-07

## 2021-06-02 ASSESSMENT — MIFFLIN-ST. JEOR: SCORE: 1294.11

## 2021-08-04 NOTE — PROGRESS NOTES
SUBJECTIVE:                                                   Janay Tam is a 40 year old female who presents to clinic today for the following health issue(s):  Patient presents with:  Consult: Discuss removal of bladder sling      Additional information: Sling placed by Dr Pozo 2020, increase in UTI and vag infx since placed    HPI:  Has some exterior irritation of the skin. Itching burning pins and needles like a yeast infection. Usually will have this midcycle or before period.  Seems like it is happening a lot since had sling last year.   Earlier in the week had some uti symptoms-pins and needles in the vaginal area.  Will have some vaginal irritation a couple days after sex.   Had yeast infection end of July with OTC med and it cleared up.     Has tried to manage her environment, topicals, detergents, clothing. Showers after exercise.     No leaking urine. Previously had stress incont with jumping, running, sneezing.     LMP 21. Regular each month.     Patient is sexually active, .  Using vasectomy for contraception.    reports that she has never smoked. She has never used smokeless tobacco.    STD testing offered?  Declined    Health maintenance updated:  yes    Today's PHQ-2 Score:   PHQ-2 (  [employer]) 2021   Q1: Little interest or pleasure in doing things 0   Q2: Feeling down, depressed or hopeless 0   PHQ-2 Score 0   PHQ-2 Total Score (12-17 Years)- Positive if 3 or more points; Administer PHQ-A if positive 0     Today's PHQ-9 Score:   PHQ-9 SCORE 2021   PHQ-9 Total Score -   PHQ-9 Total Score MyChart 3 (Minimal depression)   PHQ-9 Total Score -   Some encounter information is confidential and restricted. Go to Review Flowsheets activity to see all data.     Today's MILA-7 Score:   MILA-7 SCORE 2021   Total Score 6 (mild anxiety)   Total Score -   Some encounter information is confidential and restricted. Go to Review Flowsheets activity to see all data.        Problem list and histories reviewed & adjusted, as indicated.  Additional history: as documented.    Patient Active Problem List   Diagnosis     Allergic rhinitis     CARDIOVASCULAR SCREENING; LDL GOAL LESS THAN 160     Anxiety     Gastroesophageal reflux disease without esophagitis     Stress incontinence in female     Past Surgical History:   Procedure Laterality Date     C IUD,MIRENA  12/2010     CYSTOSCOPY, SLING TRANSOBTURATOR N/A 8/5/2020    Procedure: MIDURETHRAL SLING WITH DIAGNOSTIC CYSTOSCOPY;  Surgeon: Sanjeev Pozo MD;  Location:  OR      Social History     Tobacco Use     Smoking status: Never Smoker     Smokeless tobacco: Never Used   Substance Use Topics     Alcohol use: Yes     Alcohol/week: 0.0 standard drinks     Comment: very rare      Problem (# of Occurrences) Relation (Name,Age of Onset)    Arthritis (1) Paternal Grandmother    C.A.D. (2) Father: heart problems, Maternal Grandfather: heart problems    Cerebrovascular Disease (2) Father, Paternal Grandmother    Depression (1) Mother    Diabetes (1) Maternal Grandfather    Hyperlipidemia (1) Mother    Hypertension (1) Mother    Neurologic Disorder (2) Sister: mental health, Brother: mental health    Osteoporosis (1) Paternal Grandmother    Psychotic Disorder (1) Mother    Skin Cancer (1) Sister    Thyroid Disease (2) Mother, Sister            Current Outpatient Medications   Medication Sig     estradiol (ESTRACE) 0.1 MG/GM vaginal cream Apply pea sized amount with fingers externally to fissures nightly for one week, then 2 times per week until resolved. Apply pea sized amount into vagina nightly for 1 week then 2 times per week.     famotidine (PEPCID) 10 MG tablet Take 10 mg by mouth as needed     MULTIVITAMINS OR TABS ONE DAILY     Omega-3 Fatty Acids (FISH OIL PO)      escitalopram (LEXAPRO) 10 MG tablet Take 1 tablet (10 mg) by mouth daily     No current facility-administered medications for this visit.     Allergies  "  Allergen Reactions     Chicken-Derived Products (Egg) Other (See Comments)     Lac Bovis Other (See Comments)     Pcn [Penicillins]      Wheat Bran Other (See Comments)       ROS:  12 point review of systems negative other than symptoms noted below or in the HPI.  Genitourinary: vaginal irritation        OBJECTIVE:     /50   Pulse 56   Ht 1.626 m (5' 4\")   Wt 64.9 kg (143 lb)   LMP 05/20/2021   Breastfeeding No   BMI 24.55 kg/m    Body mass index is 24.55 kg/m .    Exam:  Constitutional:  Appearance: Well nourished, well developed alert, in no acute distress  Neurologic:  Mental Status:  Oriented X3.  Normal strength and tone, sensory exam grossly normal, mentation intact and speech normal.    Psychiatric:  Mentation appears normal and affect normal/bright.  Pelvic Exam:  External Genitalia:     Normal appearance for age, no discharge present, no tenderness present, no inflammatory lesions present, color normal  Vagina:     Normal vaginal vault without central or paravaginal defects, thick white discharge present, no inflammatory lesions present, no masses present  Bladder:     Nontender to palpation  Urethra:   Urethral Body:  Urethra palpation normal, urethra structural support normal   Urethral Meatus:  No erythema or lesions present  Cervix:     Appearance healthy, no lesions present, nontender to palpation, no bleeding present  Perineum:     Perineum within normal limits, no evidence of trauma, small fissures at introitus.  Anus:     Anus within normal limits, no hemorrhoids present  Inguinal Lymph Nodes:     No lymphadenopathy present  Pubic Hair:     Normal pubic hair distribution for age  Genitalia and Groin:     No rashes present, no lesions present, no areas of discoloration, no masses present           ASSESSMENT/PLAN:                                                        ICD-10-CM    1. Vaginal irritation  N89.8 terconazole (TERAZOL 7) 0.4 % vaginal cream     estradiol (ESTRACE) 0.1 MG/GM " vaginal cream   2. Itching of vagina  N89.8 Bacterial Vaginosis Smear     terconazole (TERAZOL 7) 0.4 % vaginal cream   3. Vaginal pain  R10.2 estradiol (ESTRACE) 0.1 MG/GM vaginal cream       Acute vaginal irritation. Vaginal discharge noted. BV smear obtained. Will notify of results.   New vaginal pain. Trial vaginal estrogen at introitus for fissures. NIghtly for 1wk, then 2x/wk.   Questions answered.       Becky Corrigan Masters, DO  [facility] [place]

## 2021-08-06 ENCOUNTER — OFFICE VISIT (OUTPATIENT)
Dept: OBGYN | Facility: CLINIC | Age: 41
End: 2021-08-06
Payer: COMMERCIAL

## 2021-08-06 VITALS
SYSTOLIC BLOOD PRESSURE: 102 MMHG | WEIGHT: 143 LBS | HEART RATE: 56 BPM | DIASTOLIC BLOOD PRESSURE: 50 MMHG | BODY MASS INDEX: 24.41 KG/M2 | HEIGHT: 64 IN

## 2021-08-06 DIAGNOSIS — N89.8 ITCHING OF VAGINA: ICD-10-CM

## 2021-08-06 DIAGNOSIS — N89.8 VAGINAL IRRITATION: Primary | ICD-10-CM

## 2021-08-06 DIAGNOSIS — R10.2 VAGINAL PAIN: ICD-10-CM

## 2021-08-06 LAB
BACTERIAL VAGINOSIS SMEAR: NORMAL
NUGENT SCORE: 1
WHITE BLOOD CELLS: NORMAL

## 2021-08-06 PROCEDURE — 87205 SMEAR GRAM STAIN: CPT | Performed by: OBSTETRICS & GYNECOLOGY

## 2021-08-06 PROCEDURE — 99214 OFFICE O/P EST MOD 30 MIN: CPT | Performed by: OBSTETRICS & GYNECOLOGY

## 2021-08-06 RX ORDER — TERCONAZOLE 0.4 %
1 CREAM WITH APPLICATOR VAGINAL AT BEDTIME
Qty: 1 G | Refills: 0 | Status: SHIPPED | OUTPATIENT
Start: 2021-08-06 | End: 2021-08-13

## 2021-08-06 RX ORDER — ESTRADIOL 0.1 MG/G
CREAM VAGINAL
Qty: 42.5 G | Refills: 0 | Status: SHIPPED | OUTPATIENT
Start: 2021-08-06 | End: 2022-09-02

## 2021-08-06 ASSESSMENT — MIFFLIN-ST. JEOR: SCORE: 1298.64

## 2021-09-24 ENCOUNTER — ANCILLARY PROCEDURE (OUTPATIENT)
Dept: MAMMOGRAPHY | Facility: CLINIC | Age: 41
End: 2021-09-24
Attending: FAMILY MEDICINE
Payer: COMMERCIAL

## 2021-09-24 DIAGNOSIS — Z12.31 VISIT FOR SCREENING MAMMOGRAM: ICD-10-CM

## 2021-09-24 PROCEDURE — 77067 SCR MAMMO BI INCL CAD: CPT | Mod: TC | Performed by: RADIOLOGY

## 2021-09-24 PROCEDURE — 77063 BREAST TOMOSYNTHESIS BI: CPT | Mod: TC | Performed by: RADIOLOGY

## 2021-09-30 ENCOUNTER — TRANSFERRED RECORDS (OUTPATIENT)
Dept: HEALTH INFORMATION MANAGEMENT | Facility: CLINIC | Age: 41
End: 2021-09-30
Payer: COMMERCIAL

## 2021-11-22 ENCOUNTER — HOSPITAL ENCOUNTER (OUTPATIENT)
Dept: BEHAVIORAL HEALTH | Facility: CLINIC | Age: 41
End: 2021-11-22
Attending: PSYCHIATRY & NEUROLOGY
Payer: COMMERCIAL

## 2021-11-22 DIAGNOSIS — F41.1 GAD (GENERALIZED ANXIETY DISORDER): Primary | ICD-10-CM

## 2021-11-22 DIAGNOSIS — F41.9 ANXIETY: ICD-10-CM

## 2021-11-22 PROCEDURE — 999N000216 HC STATISTIC ADULT CD FACE TO FACE-NO CHRG: Mod: GT,95 | Performed by: PSYCHOLOGIST

## 2021-11-22 ASSESSMENT — ANXIETY QUESTIONNAIRES
5. BEING SO RESTLESS THAT IT IS HARD TO SIT STILL: SEVERAL DAYS
GAD7 TOTAL SCORE: 6
8. IF YOU CHECKED OFF ANY PROBLEMS, HOW DIFFICULT HAVE THESE MADE IT FOR YOU TO DO YOUR WORK, TAKE CARE OF THINGS AT HOME, OR GET ALONG WITH OTHER PEOPLE?: SOMEWHAT DIFFICULT
1. FEELING NERVOUS, ANXIOUS, OR ON EDGE: SEVERAL DAYS
4. TROUBLE RELAXING: SEVERAL DAYS
GAD7 TOTAL SCORE: 6
GAD7 TOTAL SCORE: 6
7. FEELING AFRAID AS IF SOMETHING AWFUL MIGHT HAPPEN: MORE THAN HALF THE DAYS
7. FEELING AFRAID AS IF SOMETHING AWFUL MIGHT HAPPEN: MORE THAN HALF THE DAYS
3. WORRYING TOO MUCH ABOUT DIFFERENT THINGS: NOT AT ALL
2. NOT BEING ABLE TO STOP OR CONTROL WORRYING: SEVERAL DAYS
6. BECOMING EASILY ANNOYED OR IRRITABLE: NOT AT ALL

## 2021-11-22 ASSESSMENT — PATIENT HEALTH QUESTIONNAIRE - PHQ9
SUM OF ALL RESPONSES TO PHQ QUESTIONS 1-9: 3
10. IF YOU CHECKED OFF ANY PROBLEMS, HOW DIFFICULT HAVE THESE PROBLEMS MADE IT FOR YOU TO DO YOUR WORK, TAKE CARE OF THINGS AT HOME, OR GET ALONG WITH OTHER PEOPLE: SOMEWHAT DIFFICULT
SUM OF ALL RESPONSES TO PHQ QUESTIONS 1-9: 3

## 2021-11-22 NOTE — PROGRESS NOTES
Reviewed chart.    Interviewed pt by phone given [facility] did not have audio.      Pt will need psychiatry appointment.    Also, sent list of places to obtain  ADD testing through [employer]          ADHD Testing     [facility]??     Telephone:[phone]???     Website:[email or web address]??     ??     [facility]???     Telephone:[phone]??     Website:[email or web address]??     ??     [facility]??     Telephone:[phone]?     Website:[email or web address]??     ??     [facility]??     [phone]?     Website:[email or web address]??     ??     [facility]?     Telephone: [phone]?     Website:[email or web address]??     ?     [facility])?[place], [place]??[phone]????????[email or web address]??          [facility]-Dr. Rosalio Mccann      Phone: [phone] ext [phone].  Email- [email or web address]

## 2021-11-22 NOTE — PROGRESS NOTES
"[facility]    ADULT Mental Health Assessment Appointment Note    PATIENT'S NAME:  Janay Tam    Preferred Pronoun: forgot to inquire  MRN: [id number]  YOB: 1980  Address:  [facility] [address] [place] [address]  PREFERRED PHONE: [phone]  May we leave a referral related message: Yes    DATE OF SERVICE: 21  START TIME: 1115  END TIME: 1200  SERVICE MODALITY:  Phone Visit:      Provider verified identity through the following two step process.  Patient provided:  Patient photo and Patient     The patient has been notified of the following:      \"We have found that certain health care needs can be provided without the need for a face to face visit.  This service lets us provide the care you need with a phone conversation.       I will have full access to your [facility] medical record during this entire phone call.   I will be taking notes for your medical record.      Since this is like an office visit, we will bill your insurance company for this service.       There are potential benefits and risks of telephone visits (e.g. limits to patient confidentiality) that differ from in-person visits.?Confidentiality still applies for telephone services, and nobody will record the visit.  It is important to be in a quiet, private space that is free of distractions (including cell phone or other devices) during the visit.??      If during the course of the call I believe a telephone visit is not appropriate, you will not be charged for this service\"     Consent has been obtained for this service by care team member: Yes     Identifying Information:  Patient is a 41 year old, .  Patient was referred for an assessment by self.  Patient attended the session alone. Patient identified their preferred language to be English. Patient reported they does not need the assistance of an  or other support involved in therapy. " "    Services Requested:   The reason for seeking services at this time is: \" I'm scheduled with a therapist.  The pandemic is catching up with us, my family all got covid. It feels like our anxiety in our home is really high and we are trying to come down from all of that.  Still functioning around the home but my anxiety is getting the best of me.  \" Pt reports that she may be perimenopausal, that she has had her thyroid checked and is addressing this.  Her other stressor is that her middle child (13, 11, and 6) has special needs.  Hx of MILA.  Pt is interested in getting tested for ADD and in having a medication evaluation for her anxiety.  Patient does not have legal concerns.    Mental Health:  Review of Symptoms per patient report:  Depression: Change in sleep, Change in energy level and Difficulties concentrating  [place]: No Symptoms  Psychosis: No Symptoms  Anxiety: Excessive worry, Sleep disturbance and Poor concentration  Panic: No symptoms  Post Traumatic Stress Disorder: No Symptoms  Eating Disorder: No Symptoms  ADD / ADHD: Distractibility, Impulsive and Hyperverbal  Conduct Disorder: No symptoms  Autism Spectrum Disorder: No symptoms  Obsessive Compulsive Disorder: No Symptoms    Substance Use:  Do you  have a history of alcohol or illicit drug use? none      Significant Losses / Trauma / Abuse / Neglect Issues:   Patient did not serve in the .  There are indications or report of significant loss, trauma, abuse or neglect issues related to: I think so, my 11 year old has special needs, as a parent of a child with special needs.  Concerns for possible neglect are not present.     Medical History:  Patient reports jose menopausal.  Patient denies any issues with pain..   There are not significant appetite / nutritional concerns / weight changes.   Patient does not report a history of head injury / trauma / cognitive impairment.      Current Mental Status Exam:   Appearance:  Appropriate    Eye " Contact:  via telehealth   Psychomotor:  via telehealth   Attitude / Demeanor: Cooperative   Speech Rate:  Normal/ Responsive  Volume:  Normal  volume  Language:  intact  Mood:   Anxious   Affect:   Appropriate    Thought Content: Clear   Thought Process: Goal Directed  Logical     Associations:  No loosening of associations  Insight:   Good   Judgment:  Intact   Orientation:  All  Attention:  Good    Rating Scales:  PHQ9:    PHQ-9 SCORE 5/28/2020 11/22/2021 11/22/2021   PHQ-9 Total Score - - -   PHQ-9 Total Score MyChart 4 (Minimal depression) - 3 (Minimal depression)   PHQ-9 Total Score 4 3 3   ;    GAD7:    MILA-7 SCORE 5/28/2020 11/22/2021 11/22/2021   Total Score 10 (moderate anxiety) - 6 (mild anxiety)   Total Score 10 6 6       Safety Assessment:   Current Safety Concerns:  [place] Suicide Severity Rating Scale (Short Version)  [place] Suicide Severity Rating (Short Version) 8/5/2020 11/22/2021   Over the past 2 weeks have you felt down, depressed, or hopeless? no no   Over the past 2 weeks have you had thoughts of killing yourself? no no   Have you ever attempted to kill yourself? no no     Patient denies current homicidal ideation and behaviors.  Patient denies current self-injurious ideation and behaviors.    Patient denied risk behaviors associated with substance use.  Patient denies any high risk behaviors associated with mental health symptoms.  Patient reports the following current concerns for their personal safety: None.  Patient reports there are not  firearms in the house. na.     Clinical Impressions:  Generalized Anxiety Disorder  A. Excessive anxiety and worry about a number of events or activities (such as work or school performance).   B. The person finds it difficult to control the worry.  C. Select 3 or more symptoms (required for diagnosis). Only one item is required in children.   - Restlessness or feeling keyed up or on edge.    - Being easily fatigued.    - Difficulty concentrating or  mind going blank.    - Irritability.    - Sleep disturbance (difficulty falling or staying asleep, or restless unsatisfying sleep).   D. The focus of the anxiety and worry is not confined to features of an Axis I disorder.  E. The anxiety, worry, or physical symptoms cause clinically significant distress or impairment in social, occupational, or other important areas of functioning.   F. The disturbance is not due to the direct physiological effects of a substance (e.g., a drug of abuse, a medication) or a general medical condition (e.g., hyperthyroidism) and does not occur exclusively during a Mood Disorder, a Psychotic Disorder, or a Pervasive Developmental Disorder.    Therapeutic Interventions Provided: supportive active listening, explored alternatives, provided Psychoeducational support and emotional validation    Recommendations/Plan: pt has follow up therapy scheduled but needs a medication appointment with psychiatry.  A list of ADD testing agencies was sent to pt.  Pt has a hx of MILA and is noticing her anxiety is worsening.  She would like to consider a medication assessment.     Referrals: placed an order 9035 to assist pt given this writer unable to find appointment     Sarah [facility],  November 22, 2021  [facility]                    Answers for HPI/ROS submitted by the patient on 11/22/2021  If you checked off any problems, how difficult have these problems made it for you to do your work, take care of things at home, or get along with other people?: Somewhat difficult  PHQ9 TOTAL SCORE: 3  MILA 7 TOTAL SCORE: 6

## 2021-11-23 ASSESSMENT — PATIENT HEALTH QUESTIONNAIRE - PHQ9: SUM OF ALL RESPONSES TO PHQ QUESTIONS 1-9: 3

## 2021-11-23 ASSESSMENT — ANXIETY QUESTIONNAIRES: GAD7 TOTAL SCORE: 6

## 2021-12-06 ENCOUNTER — OFFICE VISIT (OUTPATIENT)
Dept: OBGYN | Facility: CLINIC | Age: 41
End: 2021-12-06
Payer: COMMERCIAL

## 2021-12-06 VITALS
BODY MASS INDEX: 25.1 KG/M2 | SYSTOLIC BLOOD PRESSURE: 116 MMHG | WEIGHT: 147 LBS | DIASTOLIC BLOOD PRESSURE: 62 MMHG | HEIGHT: 64 IN

## 2021-12-06 DIAGNOSIS — F41.9 ANXIETY: ICD-10-CM

## 2021-12-06 DIAGNOSIS — N60.11 BREAST CHANGES, FIBROCYSTIC, RIGHT: Primary | ICD-10-CM

## 2021-12-06 PROCEDURE — 99214 OFFICE O/P EST MOD 30 MIN: CPT | Performed by: NURSE PRACTITIONER

## 2021-12-06 RX ORDER — ESCITALOPRAM OXALATE 10 MG/1
10 TABLET ORAL DAILY
Qty: 60 TABLET | Refills: 0 | Status: SHIPPED | OUTPATIENT
Start: 2021-12-06 | End: 2022-02-22

## 2021-12-06 ASSESSMENT — MIFFLIN-ST. JEOR: SCORE: 1311.79

## 2021-12-06 NOTE — PROGRESS NOTES
SUBJECTIVE:                                                   Janay Tam is a 41 year old female who presents to clinic today for the following health issue(s):  Patient presents with:  Breast Problem: C/o firmness and swelling in right breast, first noticed this a few weeks ago.     HPI:Patient here for a couple of concerns.  First is she feels her right breast tissue has changed on the outer quadrant, seems thicker, was tender with cycle but that went a way.  She states she has changed her diet to keto recently and felt this came on with that.    Also she is wanting to possibly go on medication for anxiety and some depression.  Her whole family had covid about 6 weeks ago and she feels since then she has been sort of down and a lot of anxiety about things.  In the past she had tried effexor, but went off the side effects were too much for her.        Patient's last menstrual period was 2021.    Patient is sexually active, .  Using vasectomy for contraception.    reports that she has never smoked. She has never used smokeless tobacco.    STD testing offered?  Declined    Health maintenance updated:  yes    Today's PHQ-2 Score:   PHQ-2 (  [employer]) 2021   Q1: Little interest or pleasure in doing things 0   Q2: Feeling down, depressed or hopeless 0   PHQ-2 Score 0   PHQ-2 Total Score (12-17 Years)- Positive if 3 or more points; Administer PHQ-A if positive 0     Today's PHQ-9 Score:   PHQ-9 SCORE 2021   PHQ-9 Total Score -   PHQ-9 Total Score MyChart 3 (Minimal depression)   PHQ-9 Total Score -   Some encounter information is confidential and restricted. Go to Review Flowsheets activity to see all data.     Today's MILA-7 Score:   MILA-7 SCORE 2021   Total Score 6 (mild anxiety)   Total Score -   Some encounter information is confidential and restricted. Go to Review Flowsheets activity to see all data.       Problem list and histories reviewed & adjusted, as  indicated.  Additional history: as documented.    Patient Active Problem List   Diagnosis     Allergic rhinitis     CARDIOVASCULAR SCREENING; LDL GOAL LESS THAN 160     Anxiety     Gastroesophageal reflux disease without esophagitis     Stress incontinence in female     Past Surgical History:   Procedure Laterality Date     C IUD,MIRENA  12/2010     CYSTOSCOPY, SLING TRANSOBTURATOR N/A 8/5/2020    Procedure: MIDURETHRAL SLING WITH DIAGNOSTIC CYSTOSCOPY;  Surgeon: Sanjeev Pozo MD;  Location:  OR      Social History     Tobacco Use     Smoking status: Never Smoker     Smokeless tobacco: Never Used   Substance Use Topics     Alcohol use: Yes     Alcohol/week: 0.0 standard drinks     Comment: very rare      Problem (# of Occurrences) Relation (Name,Age of Onset)    Arthritis (1) Paternal Grandmother    C.A.D. (2) Father: heart problems, Maternal Grandfather: heart problems    Cerebrovascular Disease (2) Father, Paternal Grandmother    Depression (1) Mother    Diabetes (1) Maternal Grandfather    Hyperlipidemia (1) Mother    Hypertension (1) Mother    Neurologic Disorder (2) Sister: mental health, Brother: mental health    Osteoporosis (1) Paternal Grandmother    Psychotic Disorder (1) Mother    Skin Cancer (1) Sister    Thyroid Disease (2) Mother, Sister            Current Outpatient Medications   Medication Sig     escitalopram (LEXAPRO) 10 MG tablet Take 1 tablet (10 mg) by mouth daily     estradiol (ESTRACE) 0.1 MG/GM vaginal cream Apply pea sized amount with fingers externally to fissures nightly for one week, then 2 times per week until resolved. Apply pea sized amount into vagina nightly for 1 week then 2 times per week.     famotidine (PEPCID) 10 MG tablet Take 10 mg by mouth as needed     MULTIVITAMINS OR TABS ONE DAILY     Omega-3 Fatty Acids (FISH OIL PO)      No current facility-administered medications for this visit.     Allergies   Allergen Reactions     Chicken-Derived Products (Egg) Other (See  "Comments)     Lac Bovis Other (See Comments)     Pcn [Penicillins]      Wheat Bran Other (See Comments)       ROS:  12 point review of systems negative other than symptoms noted below or in the HPI.  Breast: Tenderness  Normal menstrual cycles      OBJECTIVE:     /62   Ht 1.626 m (5' 4\")   Wt 66.7 kg (147 lb)   LMP 11/25/2021   BMI 25.23 kg/m    Body mass index is 25.23 kg/m .    Exam:  Constitutional:  Appearance: Well nourished, well developed alert, in no acute distress  Breast left  Inspection of Breasts:  Symmetric bilaterally.  No puckering.  No skin changes.  Palpation of Breasts and Axillae:  No masses present on palpation, no breast tenderness Axillary Lymph Nodes:  No lymphadenopathy present  Right breast appears more fibrocystic in upper out quadrant than left. Not tender upon exam.    Neurologic:  Mental Status:  Oriented X3.  Normal strength and tone, sensory exam grossly normal, mentation intact and speech normal.    Psychiatric:  Mentation appears normal and affect normal/bright.     In-Clinic Test Results:  No results found for this or any previous visit (from the past 24 hour(s)).    ASSESSMENT/PLAN:                                                        ICD-10-CM    1. Breast changes, fibrocystic, right  N60.11 US Breast Right Complete 4 Quadrants   2. Anxiety  F41.9 escitalopram (LEXAPRO) 10 MG tablet         Will send patient for right breast US at the  breast center  Discussed going on lexapro 10 mg and patient would like to try that.  She denies have any harmful thoughts.  Return in 4 weeks for amanda of medication.    Shayna Atkinson, OLIVIA CNP  [facility]  "

## 2021-12-15 ENCOUNTER — HOSPITAL ENCOUNTER (OUTPATIENT)
Dept: MAMMOGRAPHY | Facility: CLINIC | Age: 41
End: 2021-12-15
Attending: NURSE PRACTITIONER
Payer: COMMERCIAL

## 2021-12-15 DIAGNOSIS — N60.11 BREAST CHANGES, FIBROCYSTIC, RIGHT: ICD-10-CM

## 2021-12-15 PROCEDURE — 77061 BREAST TOMOSYNTHESIS UNI: CPT | Mod: RT

## 2021-12-15 PROCEDURE — 76642 ULTRASOUND BREAST LIMITED: CPT | Mod: RT

## 2022-01-09 ENCOUNTER — HEALTH MAINTENANCE LETTER (OUTPATIENT)
Age: 42
End: 2022-01-09

## 2022-01-20 ENCOUNTER — TRANSFERRED RECORDS (OUTPATIENT)
Dept: HEALTH INFORMATION MANAGEMENT | Facility: CLINIC | Age: 42
End: 2022-01-20

## 2022-02-22 ENCOUNTER — OFFICE VISIT (OUTPATIENT)
Dept: OBGYN | Facility: CLINIC | Age: 42
End: 2022-02-22
Payer: COMMERCIAL

## 2022-02-22 VITALS
HEIGHT: 64 IN | WEIGHT: 148.6 LBS | SYSTOLIC BLOOD PRESSURE: 94 MMHG | BODY MASS INDEX: 25.37 KG/M2 | DIASTOLIC BLOOD PRESSURE: 64 MMHG

## 2022-02-22 DIAGNOSIS — N63.20 LEFT BREAST LUMP: Primary | ICD-10-CM

## 2022-02-22 PROCEDURE — 99213 OFFICE O/P EST LOW 20 MIN: CPT | Performed by: OBSTETRICS & GYNECOLOGY

## 2022-02-22 NOTE — PROGRESS NOTES
"  SUBJECTIVE:                                                   Janay Tam is a 41 year old female who presents to clinic today for the following health issue(s):  Patient presents with:  Breast Problem: Pt states that she noticed a breast lump in her left breast this morning. It is about the size of a pebble that is not painful but is itchy.      HPI:  Here today for newly discovered left breast lump.  \"Pebble sized\".  Noticed with monthly breast exam this AM.  Has noticed area to be more itchy but no pain or discomfort.  No discoloration or rash.  Otherwise feeling well.  No recent change in activity or trauma to the area.  Had normal screening mammogram in September and had normal right breast diagnostic mammo and us in December for right breast changes.    Has sister in law recently diagnosed with breast cancer and 2 cousins with hx breast cancer so just wants to be sure  No other family hx  Uses estrogen cream on occ but no systemic hormones  Had her period last week  Started on zoloft with [place] last week    Patient's last menstrual period was 2022 (approximate)..     Patient is sexually active, .  Using vasectomy for contraception.    reports that she has never smoked. She has never used smokeless tobacco.    STD testing offered?  Declined    Health maintenance updated:  yes    Today's PHQ-2 Score:   PHQ-2 (  [employer]) 2021   Q1: Little interest or pleasure in doing things 0   Q2: Feeling down, depressed or hopeless 0   PHQ-2 Score 0   PHQ-2 Total Score (12-17 Years)- Positive if 3 or more points; Administer PHQ-A if positive 0     Today's PHQ-9 Score:   PHQ-9 SCORE 2021   PHQ-9 Total Score -   PHQ-9 Total Score MyChart 3 (Minimal depression)   PHQ-9 Total Score -   Some encounter information is confidential and restricted. Go to Review Flowsheets activity to see all data.     Today's MILA-7 Score:   MILA-7 SCORE 2021   Total Score 6 (mild anxiety)   Total Score - "   Some encounter information is confidential and restricted. Go to Review Flowsheets activity to see all data.       Problem list and histories reviewed & adjusted, as indicated.  Additional history: as documented.    Patient Active Problem List   Diagnosis     Allergic rhinitis     CARDIOVASCULAR SCREENING; LDL GOAL LESS THAN 160     Anxiety     Gastroesophageal reflux disease without esophagitis     Stress incontinence in female     Past Surgical History:   Procedure Laterality Date     C IUD,MIRENA  12/2010     CYSTOSCOPY, SLING TRANSOBTURATOR N/A 8/5/2020    Procedure: MIDURETHRAL SLING WITH DIAGNOSTIC CYSTOSCOPY;  Surgeon: Sanjeev Pozo MD;  Location:  OR      Social History     Tobacco Use     Smoking status: Never Smoker     Smokeless tobacco: Never Used   Substance Use Topics     Alcohol use: Not Currently     Alcohol/week: 0.0 standard drinks     Comment: very rare      Problem (# of Occurrences) Relation (Name,Age of Onset)    Arthritis (1) Paternal Grandmother    Breast Cancer (2) Maternal Cousin, Maternal Cousin    C.A.D. (2) Father: heart problems, Maternal Grandfather: heart problems    Cerebrovascular Disease (2) Father, Paternal Grandmother    Depression (1) Mother    Diabetes (1) Maternal Grandfather    Hyperlipidemia (1) Mother    Hypertension (1) Mother    Neurologic Disorder (2) Sister: mental health, Brother: mental health    Osteoporosis (1) Paternal Grandmother    Parkinsonism (1) Mother    Psychotic Disorder (1) Mother    Skin Cancer (1) Sister    Thyroid Disease (2) Mother, Sister            Current Outpatient Medications   Medication Sig     estradiol (ESTRACE) 0.1 MG/GM vaginal cream Apply pea sized amount with fingers externally to fissures nightly for one week, then 2 times per week until resolved. Apply pea sized amount into vagina nightly for 1 week then 2 times per week.     famotidine (PEPCID) 10 MG tablet Take 10 mg by mouth as needed     MULTIVITAMINS OR TABS ONE DAILY  "    Omega-3 Fatty Acids (FISH OIL PO)      sertraline (ZOLOFT) 50 MG tablet 25 mg      No current facility-administered medications for this visit.     Allergies   Allergen Reactions     Chicken-Derived Products (Egg) Other (See Comments)     Lac Bovis Other (See Comments)     Pcn [Penicillins]      Wheat Bran Other (See Comments)       ROS:  12 point review of systems negative other than symptoms noted below or in the HPI.  Breast: Lumps and itchiness  No urinary frequency or dysuria, bladder or kidney problems, Normal menstrual cycles      OBJECTIVE:     BP 94/64   Ht 1.626 m (5' 4\")   Wt 67.4 kg (148 lb 9.6 oz)   LMP 02/17/2022 (Approximate)   Breastfeeding No   BMI 25.51 kg/m    Body mass index is 25.51 kg/m .    Exam:  Constitutional:  Appearance: Well nourished, well developed alert, in no acute distress  Breasts:  Inspection of Breasts:  Symmetric bilaterally.  No puckering.  No skin changes.  Palpation of Breasts and Axillae:  No masses present on palpation, no breast tenderness Axillary Lymph Nodes:  No lymphadenopathy present; +FIBROCYSTIC BREASTS WITH SEVERAL SMALL NODULAR AREAS; NO DISCRETE LESIONS OR AREAS OF CONCERN; DEMONSTRATED WITH YOVANI THE AREA WHICH SHE DESCRIBED PEBBLE LIKE LUMP TO BE VERY SIMILAR IN SENSATION TO SEVERAL OTHER AREAS THROUGHOUT BOTH BREASTS  Skin: General Inspection:  No rashes present, no lesions present, no areas of discoloration.  Neurologic:  Mental Status:  Oriented X3.  Normal strength and tone, sensory exam grossly normal, mentation intact and speech normal.    Psychiatric:  Mentation appears normal and affect normal/bright.     In-Clinic Test Results:  No results found for this or any previous visit (from the past 24 hour(s)).    ASSESSMENT/PLAN:                                                        ICD-10-CM    1. Left breast lump  N63.20        Patient Instructions   Reassurance given with exam today.  Discussed Yovani's age and more fibrocystic breast with different " textures/architecture and fluctuations based on cycle.    Will continue with monthly exams and call as needed.      Juani Acuna MD  [facility] [place]

## 2022-02-22 NOTE — PATIENT INSTRUCTIONS
Reassurance given with exam today.  Discussed Janay's age and more fibrocystic breast with different textures/architecture and fluctuations based on cycle.    Will continue with monthly exams and call as needed.

## 2022-04-05 DIAGNOSIS — Z23 NEED FOR HPV VACCINE: Primary | ICD-10-CM

## 2022-04-11 ENCOUNTER — ALLIED HEALTH/NURSE VISIT (OUTPATIENT)
Dept: NURSING | Facility: CLINIC | Age: 42
End: 2022-04-11
Payer: COMMERCIAL

## 2022-04-11 DIAGNOSIS — Z23 NEED FOR HPV VACCINE: ICD-10-CM

## 2022-04-11 PROCEDURE — 90651 9VHPV VACCINE 2/3 DOSE IM: CPT

## 2022-04-11 PROCEDURE — 90471 IMMUNIZATION ADMIN: CPT

## 2022-04-11 PROCEDURE — 99207 PR NO CHARGE NURSE ONLY: CPT

## 2022-04-12 ENCOUNTER — OFFICE VISIT (OUTPATIENT)
Dept: FAMILY MEDICINE | Facility: CLINIC | Age: 42
End: 2022-04-12
Payer: COMMERCIAL

## 2022-04-12 VITALS — DIASTOLIC BLOOD PRESSURE: 64 MMHG | SYSTOLIC BLOOD PRESSURE: 104 MMHG

## 2022-04-12 DIAGNOSIS — D22.9 MULTIPLE BENIGN NEVI: Primary | ICD-10-CM

## 2022-04-12 DIAGNOSIS — Z12.83 SKIN CANCER SCREENING: ICD-10-CM

## 2022-04-12 DIAGNOSIS — L81.4 SOLAR LENTIGO: ICD-10-CM

## 2022-04-12 DIAGNOSIS — D18.01 CHERRY ANGIOMA: ICD-10-CM

## 2022-04-12 DIAGNOSIS — L82.1 SEBORRHEIC KERATOSIS: ICD-10-CM

## 2022-04-12 PROCEDURE — 99213 OFFICE O/P EST LOW 20 MIN: CPT | Performed by: PHYSICIAN ASSISTANT

## 2022-04-12 NOTE — PROGRESS NOTES
[facility] Dermatology Note  Encounter Date: Apr 12, 2022  Office Visit     Dermatology Problem List:  1. Warts x2, bilateral legs  - s/p LN2 4/2/2021  2. ISKs s/p cryo 4/2/2021    SH: Patient has three children, ages 13, 11, and 7.   ____________________________________________    Assessment & Plan:    # Benign lesions: Multiple benign nevi, solar lentigines, seborrheic keratoses, cherry angiomas.   Explained to patient benign nature of lesion. No treatment is necessary at this time unless the lesion changes or becomes symptomatic.   - ABCDs of melanoma were discussed and self skin checks were advised.  - Sun precaution was advised including the use of sun screens of SPF 30 or higher, sun protective clothing, and avoidance of tanning beds.     # Family history of skin cancer - sister  - Continue photoprotection - recommend SPF 30 or higher with frequent reapplication  - Continue yearly skin exams  - Advised to monitor for changing, non-healing, bleeding, painful, changing, or otherwise symptomatic lesions      Procedures Performed:   None      Follow-up: 1 year(s) in-person, or earlier for new or changing lesions    Staff and Scribe:     Scribe Disclosure:  I, Leila Wolff, am serving as a scribe to document services personally performed by Christie Hanson PA-C based on data collection and the provider's statements to me.     Provider Disclosure:   The documentation recorded by the scribe accurately reflects the services I personally performed and the decisions made by me.    All risks, benefits and alternatives were discussed with patient.  Patient is in agreement and understands the assessment and plan.  All questions were answered.  Sun Screen Education was given.   Return to Clinic annually or sooner as needed.   Christie Hanson PA-C   [facility]   ____________________________________________    CC: Derm Problem (Dry spot on forehead since winter  and has been flakey. Would like to do a FBSCS.)    HPI:  Ms. Janay Tam is a(n) 41 year old female who presents today as a return patient for FBSE, noting a spot on her forehead. The patient was last seen in dermatology on 4/2/2021 by Ericka Hobbs PA-C, at which time 1 ISK and 2 warts were treated with LN2.     Today, the patient makes note of a spot on her forehead that is dry and flaky despite thorough moisturizing. It has been present for about 3 months now and she is unsure if it popped up suddenly or gradually. Patient reports that her sister had skin cancer, type unknown, and she is still alive. Also notes that her sister used to use a tanning bed quite frequently.    Patient is otherwise feeling well, without additional skin concerns.    Labs Reviewed:  N/A    Physical Exam:  Vitals: /64   LMP 02/17/2022 (Approximate)   SKIN: Total skin excluding the undergarment areas was performed. The exam included the head/face, neck, both arms, chest, back, abdomen, both legs, digits and/or nails.   - There are dome shaped bright red papules on the trunk and extremities.   - Multiple regular brown pigmented macules and papules are identified on the trunk and extremities.   - Scattered brown macules on sun exposed areas.  - There are waxy stuck on tan to brown papules on the trunk and extremities, including one on the forehead.   - No other lesions of concern on areas examined.     Medications:  Current Outpatient Medications   Medication     estradiol (ESTRACE) 0.1 MG/GM vaginal cream     famotidine (PEPCID) 10 MG tablet     MULTIVITAMINS OR TABS     Omega-3 Fatty Acids (FISH OIL PO)     sertraline (ZOLOFT) 50 MG tablet     No current facility-administered medications for this visit.      Past Medical History:   Patient Active Problem List   Diagnosis     Allergic rhinitis     CARDIOVASCULAR SCREENING; LDL GOAL LESS THAN 160     Anxiety     Gastroesophageal reflux disease without esophagitis     Stress  incontinence in female     Past Medical History:   Diagnosis Date     Allergic rhinitis, cause unspecified     Allergic rhinitis     Cytomegaloviral disease (H) 2010    Second child with hearing loss. 6/14 IgM neg, IgG pos     Depressive disorder, not elsewhere classified     use effexor in the past - more seasonal     Generalized anxiety disorder      GERD (gastroesophageal reflux disease)      Papanicolaou smear of cervix with atypical squamous cells of undetermined significance (ASC-US) 2001    Unsure of outcome     Postpartum depression     with first delivery, not currently medicated

## 2022-04-12 NOTE — LETTER
4/12/2022         RE: Janay Tam  [address] [place] [address]        Dear Colleague,    Thank you for referring your patient, Janay Tam, to the [facility] [facility]. Please see a copy of my visit note below.    [facility] Dermatology Note  Encounter Date: Apr 12, 2022  Office Visit     Dermatology Problem List:  1. Warts x2, bilateral legs  - s/p LN2 4/2/2021  2. ISKs s/p cryo 4/2/2021    SH: Patient has three children, ages 13, 11, and 7.   ____________________________________________    Assessment & Plan:    # Benign lesions: Multiple benign nevi, solar lentigines, seborrheic keratoses, cherry angiomas.   Explained to patient benign nature of lesion. No treatment is necessary at this time unless the lesion changes or becomes symptomatic.   - ABCDs of melanoma were discussed and self skin checks were advised.  - Sun precaution was advised including the use of sun screens of SPF 30 or higher, sun protective clothing, and avoidance of tanning beds.     # Family history of skin cancer - sister  - Continue photoprotection - recommend SPF 30 or higher with frequent reapplication  - Continue yearly skin exams  - Advised to monitor for changing, non-healing, bleeding, painful, changing, or otherwise symptomatic lesions      Procedures Performed:   None      Follow-up: 1 year(s) in-person, or earlier for new or changing lesions    Staff and Scribe:     Scribe Disclosure:  I, Leila Wolff, am serving as a scribe to document services personally performed by Christie Hanson PA-C based on data collection and the provider's statements to me.     Provider Disclosure:   The documentation recorded by the scribe accurately reflects the services I personally performed and the decisions made by me.    All risks, benefits and alternatives were discussed with patient.  Patient is in agreement and understands the assessment and plan.  All questions  were answered.  Sun Screen Education was given.   Return to Clinic annually or sooner as needed.   Christie Hanson PA-C   [facility]   ____________________________________________    CC: Derm Problem (Dry spot on forehead since winter and has been flakey. Would like to do a FBSCS.)    HPI:  Ms. Janay Tam is a(n) 41 year old female who presents today as a return patient for FBSE, noting a spot on her forehead. The patient was last seen in dermatology on 4/2/2021 by Ericka Hobbs PA-C, at which time 1 ISK and 2 warts were treated with LN2.     Today, the patient makes note of a spot on her forehead that is dry and flaky despite thorough moisturizing. It has been present for about 3 months now and she is unsure if it popped up suddenly or gradually. Patient reports that her sister had skin cancer, type unknown, and she is still alive. Also notes that her sister used to use a tanning bed quite frequently.    Patient is otherwise feeling well, without additional skin concerns.    Labs Reviewed:  N/A    Physical Exam:  Vitals: /64   LMP 02/17/2022 (Approximate)   SKIN: Total skin excluding the undergarment areas was performed. The exam included the head/face, neck, both arms, chest, back, abdomen, both legs, digits and/or nails.   - There are dome shaped bright red papules on the trunk and extremities.   - Multiple regular brown pigmented macules and papules are identified on the trunk and extremities.   - Scattered brown macules on sun exposed areas.  - There are waxy stuck on tan to brown papules on the trunk and extremities, including one on the forehead.   - No other lesions of concern on areas examined.     Medications:  Current Outpatient Medications   Medication     estradiol (ESTRACE) 0.1 MG/GM vaginal cream     famotidine (PEPCID) 10 MG tablet     MULTIVITAMINS OR TABS     Omega-3 Fatty Acids (FISH OIL PO)     sertraline (ZOLOFT) 50 MG tablet     No current  facility-administered medications for this visit.      Past Medical History:   Patient Active Problem List   Diagnosis     Allergic rhinitis     CARDIOVASCULAR SCREENING; LDL GOAL LESS THAN 160     Anxiety     Gastroesophageal reflux disease without esophagitis     Stress incontinence in female     Past Medical History:   Diagnosis Date     Allergic rhinitis, cause unspecified     Allergic rhinitis     Cytomegaloviral disease (H) 2010    Second child with hearing loss. 6/14 IgM neg, IgG pos     Depressive disorder, not elsewhere classified     use effexor in the past - more seasonal     Generalized anxiety disorder      GERD (gastroesophageal reflux disease)      Papanicolaou smear of cervix with atypical squamous cells of undetermined significance (ASC-US) 2001    Unsure of outcome     Postpartum depression     with first delivery, not currently medicated       Again, thank you for allowing me to participate in the care of your patient.        Sincerely,        Christie Hanson PA-C

## 2022-06-06 DIAGNOSIS — Z23 NEED FOR HPV VACCINE: Primary | ICD-10-CM

## 2022-06-08 ENCOUNTER — OFFICE VISIT (OUTPATIENT)
Dept: OBGYN | Facility: CLINIC | Age: 42
End: 2022-06-08
Payer: COMMERCIAL

## 2022-06-08 VITALS
BODY MASS INDEX: 25.74 KG/M2 | SYSTOLIC BLOOD PRESSURE: 108 MMHG | WEIGHT: 150.8 LBS | HEIGHT: 64 IN | DIASTOLIC BLOOD PRESSURE: 68 MMHG

## 2022-06-08 DIAGNOSIS — N89.8 VAGINAL DISCHARGE: ICD-10-CM

## 2022-06-08 DIAGNOSIS — R10.2 PELVIC PAIN IN FEMALE: ICD-10-CM

## 2022-06-08 DIAGNOSIS — R30.0 DYSURIA: Primary | ICD-10-CM

## 2022-06-08 LAB
ALBUMIN UR-MCNC: NEGATIVE MG/DL
APPEARANCE UR: CLEAR
BILIRUB UR QL STRIP: NEGATIVE
CLUE CELLS: PRESENT
COLOR UR AUTO: YELLOW
GLUCOSE UR STRIP-MCNC: NEGATIVE MG/DL
HGB UR QL STRIP: NEGATIVE
KETONES UR STRIP-MCNC: NEGATIVE MG/DL
LEUKOCYTE ESTERASE UR QL STRIP: NEGATIVE
NITRATE UR QL: NEGATIVE
PH UR STRIP: 7 [PH] (ref 5–7)
RBC #/AREA URNS AUTO: NORMAL /HPF
SP GR UR STRIP: 1.01 (ref 1–1.03)
TRICHOMONAS, WET PREP: ABNORMAL
UROBILINOGEN UR STRIP-ACNC: 0.2 E.U./DL
WBC #/AREA URNS AUTO: NORMAL /HPF
WBC'S/HIGH POWER FIELD, WET PREP: ABNORMAL
YEAST, WET PREP: ABNORMAL

## 2022-06-08 PROCEDURE — 81001 URINALYSIS AUTO W/SCOPE: CPT | Performed by: NURSE PRACTITIONER

## 2022-06-08 PROCEDURE — 87086 URINE CULTURE/COLONY COUNT: CPT | Performed by: NURSE PRACTITIONER

## 2022-06-08 PROCEDURE — 87210 SMEAR WET MOUNT SALINE/INK: CPT | Performed by: NURSE PRACTITIONER

## 2022-06-08 PROCEDURE — 99214 OFFICE O/P EST MOD 30 MIN: CPT | Performed by: NURSE PRACTITIONER

## 2022-06-08 RX ORDER — METRONIDAZOLE 500 MG/1
500 TABLET ORAL 2 TIMES DAILY
Qty: 14 TABLET | Refills: 0 | Status: SHIPPED | OUTPATIENT
Start: 2022-06-08 | End: 2023-03-02

## 2022-06-08 NOTE — PROGRESS NOTES
SUBJECTIVE:                                                   Janay Tam is a 41 year old female who presents to clinic today for the following health issue(s):  Patient presents with:  Urinary Problem: Uti, lower back pain, sensitivity by urethra, bloated, pressure pain when has to urinate          HPI: Patient is c/o lower back and pelvic pain.  Also had some sensitivity around urethra when voided this am.  Having some bloating, and little vaginal itching.      Patient's last menstrual period was 2022 (exact date)..     Patient is sexually active, .  Using vasectomy for contraception.    reports that she has never smoked. She has never used smokeless tobacco.  STD testing offered?  Declined    Health maintenance updated:  yes    Today's PHQ-2 Score:   PHQ-2 (  [employer]) 2021   Q1: Little interest or pleasure in doing things 0   Q2: Feeling down, depressed or hopeless 0   PHQ-2 Score 0   PHQ-2 Total Score (12-17 Years)- Positive if 3 or more points; Administer PHQ-A if positive 0     Today's PHQ-9 Score:   PHQ-9 SCORE 2021   PHQ-9 Total Score -   PHQ-9 Total Score MyChart 3 (Minimal depression)   PHQ-9 Total Score -   Some encounter information is confidential and restricted. Go to Review Flowsheets activity to see all data.     Today's MILA-7 Score:   MILA-7 SCORE 2021   Total Score 6 (mild anxiety)   Total Score -   Some encounter information is confidential and restricted. Go to Review Flowsheets activity to see all data.       Problem list and histories reviewed & adjusted, as indicated.  Additional history: as documented.    Patient Active Problem List   Diagnosis     Allergic rhinitis     CARDIOVASCULAR SCREENING; LDL GOAL LESS THAN 160     Anxiety     Gastroesophageal reflux disease without esophagitis     Stress incontinence in female     Past Surgical History:   Procedure Laterality Date     C IUD,MIRENA  2010     CYSTOSCOPY, SLING TRANSOBTURATOR N/A 2020     Procedure: MIDURETHRAL SLING WITH DIAGNOSTIC CYSTOSCOPY;  Surgeon: Sanjeev Pozo MD;  Location:  OR      Social History     Tobacco Use     Smoking status: Never Smoker     Smokeless tobacco: Never Used   Substance Use Topics     Alcohol use: Not Currently     Alcohol/week: 0.0 standard drinks     Comment: very rare      Problem (# of Occurrences) Relation (Name,Age of Onset)    Arthritis (1) Paternal Grandmother    Breast Cancer (2) Maternal Cousin, Maternal Cousin    C.A.D. (2) Father: heart problems, Maternal Grandfather: heart problems    Cerebrovascular Disease (2) Father, Paternal Grandmother    Depression (1) Mother    Diabetes (1) Maternal Grandfather    Hyperlipidemia (1) Mother    Hypertension (1) Mother    Neurologic Disorder (2) Sister: mental health, Brother: mental health    Osteoporosis (1) Paternal Grandmother    Parkinsonism (1) Mother    Psychotic Disorder (1) Mother    Skin Cancer (1) Sister    Thyroid Disease (2) Mother, Sister            Current Outpatient Medications   Medication Sig     estradiol (ESTRACE) 0.1 MG/GM vaginal cream Apply pea sized amount with fingers externally to fissures nightly for one week, then 2 times per week until resolved. Apply pea sized amount into vagina nightly for 1 week then 2 times per week.     famotidine (PEPCID) 10 MG tablet Take 10 mg by mouth as needed     metroNIDAZOLE (FLAGYL) 500 MG tablet Take 1 tablet (500 mg) by mouth 2 times daily     MULTIVITAMINS OR TABS ONE DAILY     Omega-3 Fatty Acids (FISH OIL PO)      sertraline (ZOLOFT) 50 MG tablet 25 mg      No current facility-administered medications for this visit.     Allergies   Allergen Reactions     Chicken-Derived Products (Egg) Other (See Comments)     Lac Bovis Other (See Comments)     Pcn [Penicillins]      Wheat Bran Other (See Comments)       ROS:  12 point review of systems negative other than symptoms noted below or in the HPI.  Genitourinary: Painful Urination and Urgency  Normal  "menstrual cycles      OBJECTIVE:     /68   Ht 1.626 m (5' 4\")   Wt 68.4 kg (150 lb 12.8 oz)   LMP 06/02/2022 (Exact Date)   Breastfeeding No   BMI 25.88 kg/m    Body mass index is 25.88 kg/m .    Exam:  Constitutional:  Appearance: Well nourished, well developed alert, in no acute distress  Neurologic:  Mental Status:  Oriented X3.  Normal strength and tone, sensory exam grossly normal, mentation intact and speech normal.    Psychiatric:  Mentation appears normal and affect normal/bright.  Pelvic Exam:  External Genitalia:     Normal appearance for age, no discharge present, no tenderness present, no inflammatory lesions present, color normal  Vagina:     Normal vaginal vault without central or paravaginal defects, small amount of cream discharge present, no inflammatory lesions present, no masses present  Wet mount was done  Bladder:     Nontender to palpation  Urethra:   Urethral Body:  Urethra palpation normal, urethra structural support normal   Urethral Meatus:  No erythema or lesions present  Cervix:     Appearance healthy, no lesions present, nontender to palpation, no bleeding present  Uterus:     Uterus: firm, normal sized and nontender, anteverted in position.   Adnexa:     Right  adnexal tenderness present, no adnexal masses present  Perineum:     Perineum within normal limits, no evidence of trauma, no rashes or skin lesions present  Anus:     Anus within normal limits, no hemorrhoids present  Inguinal Lymph Nodes:     No lymphadenopathy present  Pubic Hair:     Normal pubic hair distribution for age  Genitalia and Groin:     No rashes present, no lesions present, no areas of discoloration, no masses present       In-Clinic Test Results:  Results for orders placed or performed in visit on 06/08/22 (from the past 24 hour(s))   UA with Microscopic - lab collect   Result Value Ref Range    Color Urine Yellow Colorless, Straw, Light Yellow, Yellow    Appearance Urine Clear Clear    Glucose Urine " Negative Negative mg/dL    Bilirubin Urine Negative Negative    Ketones Urine Negative Negative mg/dL    Specific Gravity Urine 1.010 1.003 - 1.035    Blood Urine Negative Negative    pH Urine 7.0 5.0 - 7.0    Protein Albumin Urine Negative Negative mg/dL    Urobilinogen Urine 0.2 0.2, 1.0 E.U./dL    Nitrite Urine Negative Negative    Leukocyte Esterase Urine Negative Negative   Urine Microscopic Exam   Result Value Ref Range    RBC Urine 0-2 0-2 /HPF /HPF    WBC Urine 0-5 0-5 /HPF /HPF   Wet preparation    Specimen: Vagina; Swab   Result Value Ref Range    Trichomonas Absent Absent    Yeast Absent Absent    Clue Cells Present (A) Absent    WBCs/high power field 1+ (A) None       ASSESSMENT/PLAN:                                                        ICD-10-CM    1. Dysuria  R30.0 UA with Microscopic - lab collect     UA with Microscopic - lab collect     Urine Microscopic Exam     Urine Culture   2. Vaginal discharge  N89.8 Wet preparation     metroNIDAZOLE (FLAGYL) 500 MG tablet   3. Pelvic pain in female  R10.2 US Transvaginal Pelvic Non-OB         Will send a UC.  Patient to start on flagyl.  Return for pelvic US for pelvic pain and to see me after.    Shayna Atkinson, OLIVIA CNP  [facility] [place]

## 2022-06-09 ENCOUNTER — ANCILLARY PROCEDURE (OUTPATIENT)
Dept: ULTRASOUND IMAGING | Facility: CLINIC | Age: 42
End: 2022-06-09
Payer: COMMERCIAL

## 2022-06-09 ENCOUNTER — OFFICE VISIT (OUTPATIENT)
Dept: OBGYN | Facility: CLINIC | Age: 42
End: 2022-06-09

## 2022-06-09 VITALS
BODY MASS INDEX: 25.61 KG/M2 | SYSTOLIC BLOOD PRESSURE: 110 MMHG | DIASTOLIC BLOOD PRESSURE: 66 MMHG | HEIGHT: 64 IN | WEIGHT: 150 LBS

## 2022-06-09 DIAGNOSIS — R10.2 PELVIC PAIN IN FEMALE: ICD-10-CM

## 2022-06-09 DIAGNOSIS — R10.2 PELVIC PAIN IN FEMALE: Primary | ICD-10-CM

## 2022-06-09 PROCEDURE — 76830 TRANSVAGINAL US NON-OB: CPT | Performed by: OBSTETRICS & GYNECOLOGY

## 2022-06-09 PROCEDURE — 99212 OFFICE O/P EST SF 10 MIN: CPT | Performed by: NURSE PRACTITIONER

## 2022-06-09 NOTE — PROGRESS NOTES
SUBJECTIVE:                                                   Janay Tam is a 41 year old female who presents to clinic today for the following health issue(s):  Patient presents with:  Follow Up: US follow up         HPI:Patient is here for follow up from pelvic pain and pelvic US.  She is being treated for BV with oral flagyl and states that she is feeling better the pelvic pain has improved.      Patient's last menstrual period was 2022 (exact date)..     Patient is sexually active, .  Using vasectomy for contraception.    reports that she has never smoked. She has never used smokeless tobacco.  STD testing offered?  Declined    Health maintenance updated:  yes    Today's PHQ-2 Score:   PHQ-2 (  [employer]) 2021   Q1: Little interest or pleasure in doing things 0   Q2: Feeling down, depressed or hopeless 0   PHQ-2 Score 0   PHQ-2 Total Score (12-17 Years)- Positive if 3 or more points; Administer PHQ-A if positive 0     Today's PHQ-9 Score:   PHQ-9 SCORE 2021   PHQ-9 Total Score -   PHQ-9 Total Score MyChart 3 (Minimal depression)   PHQ-9 Total Score -   Some encounter information is confidential and restricted. Go to Review Flowsheets activity to see all data.     Today's MILA-7 Score:   MILA-7 SCORE 2021   Total Score 6 (mild anxiety)   Total Score -   Some encounter information is confidential and restricted. Go to Review Flowsheets activity to see all data.       Problem list and histories reviewed & adjusted, as indicated.  Additional history: as documented.    Patient Active Problem List   Diagnosis     Allergic rhinitis     CARDIOVASCULAR SCREENING; LDL GOAL LESS THAN 160     Anxiety     Gastroesophageal reflux disease without esophagitis     Stress incontinence in female     Past Surgical History:   Procedure Laterality Date     C IUD,MIRENA  2010     CYSTOSCOPY, SLING TRANSOBTURATOR N/A 2020    Procedure: MIDURETHRAL SLING WITH DIAGNOSTIC CYSTOSCOPY;  Surgeon:  "Sanjeev Pozo MD;  Location:  OR      Social History     Tobacco Use     Smoking status: Never Smoker     Smokeless tobacco: Never Used   Substance Use Topics     Alcohol use: Not Currently     Alcohol/week: 0.0 standard drinks     Comment: very rare      Problem (# of Occurrences) Relation (Name,Age of Onset)    Arthritis (1) Paternal Grandmother    Breast Cancer (2) Maternal Cousin, Maternal Cousin    C.A.D. (2) Father: heart problems, Maternal Grandfather: heart problems    Cerebrovascular Disease (2) Father, Paternal Grandmother    Depression (1) Mother    Diabetes (1) Maternal Grandfather    Hyperlipidemia (1) Mother    Hypertension (1) Mother    Neurologic Disorder (2) Sister: mental health, Brother: mental health    Osteoporosis (1) Paternal Grandmother    Parkinsonism (1) Mother    Psychotic Disorder (1) Mother    Skin Cancer (1) Sister    Thyroid Disease (2) Mother, Sister            Current Outpatient Medications   Medication Sig     estradiol (ESTRACE) 0.1 MG/GM vaginal cream Apply pea sized amount with fingers externally to fissures nightly for one week, then 2 times per week until resolved. Apply pea sized amount into vagina nightly for 1 week then 2 times per week.     famotidine (PEPCID) 10 MG tablet Take 10 mg by mouth as needed     metroNIDAZOLE (FLAGYL) 500 MG tablet Take 1 tablet (500 mg) by mouth 2 times daily     MULTIVITAMINS OR TABS ONE DAILY     Omega-3 Fatty Acids (FISH OIL PO)      sertraline (ZOLOFT) 50 MG tablet 25 mg      No current facility-administered medications for this visit.     Allergies   Allergen Reactions     Chicken-Derived Products (Egg) Other (See Comments)     Lac Bovis Other (See Comments)     Pcn [Penicillins]      Wheat Bran Other (See Comments)       ROS:  12 point review of systems negative other than symptoms noted below or in the HPI.  Normal menstrual cycles      OBJECTIVE:     /66   Ht 1.626 m (5' 4\")   Wt 68 kg (150 lb)   LMP 06/02/2022 (Exact " Date)   BMI 25.75 kg/m    Body mass index is 25.75 kg/m .    Exam:  Constitutional:  Appearance: Well nourished, well developed alert, in no acute distress  Neurologic:  Mental Status:  Oriented X3.  Normal strength and tone, sensory exam grossly normal, mentation intact and speech normal.    Psychiatric:  Mentation appears normal and affect normal/bright.   Referring Provider: Shayna Atkinson CNP   Sonographer:  Brittnee Spurling, RDMS  Indication: Pain- Pelvic pain Left  LMP: Patient's last menstrual period was 06/02/2022 (exact date).     Gynecological Ultrasonography:   Uterus: anteverted. Contour is smooth/regular.  Size: 8.6 x 5.0 x 3.6 cm  Endometrium: Thickness Total 6.4 mm  Right Ovary: 3.2 x 2.2 x 2.1 cm. Follicle 10.0 mm  Left Ovary: 2.2 x 1.1 x 1.2 cm. Wnl  Cul de Sac Free Fluid: No free fluid     Impression: Anteverted uterus with normal endometrium and myometrium. Normal bilateral ovaries. No explanation for pelvic pain seen on ultrasound.  Akila Olguin MD       In-Clinic Test Results:  No results found for this or any previous visit (from the past 24 hour(s)).    ASSESSMENT/PLAN:                                                        ICD-10-CM    1. Pelvic pain in female  R10.2        Discussed normal US results.  To continue Flagyl for BV.  Return prn.    OLIVIA Rodriguez [facility] [place]

## 2022-06-10 LAB — BACTERIA UR CULT: NO GROWTH

## 2022-06-12 ENCOUNTER — MYC MEDICAL ADVICE (OUTPATIENT)
Dept: OBGYN | Facility: CLINIC | Age: 42
End: 2022-06-12
Payer: COMMERCIAL

## 2022-06-12 DIAGNOSIS — N76.0 BACTERIAL VAGINOSIS: Primary | ICD-10-CM

## 2022-06-12 DIAGNOSIS — B96.89 BACTERIAL VAGINOSIS: Primary | ICD-10-CM

## 2022-06-13 RX ORDER — METRONIDAZOLE 7.5 MG/G
1 GEL VAGINAL DAILY
Qty: 35 G | Refills: 0 | Status: SHIPPED | OUTPATIENT
Start: 2022-06-13 | End: 2022-06-20

## 2022-08-22 ENCOUNTER — TELEPHONE (OUTPATIENT)
Dept: BEHAVIORAL HEALTH | Facility: CLINIC | Age: 42
End: 2022-08-22

## 2022-08-30 ENCOUNTER — TELEPHONE (OUTPATIENT)
Dept: BEHAVIORAL HEALTH | Facility: CLINIC | Age: 42
End: 2022-08-30

## 2022-09-01 ENCOUNTER — HOSPITAL ENCOUNTER (OUTPATIENT)
Dept: BEHAVIORAL HEALTH | Facility: CLINIC | Age: 42
Discharge: HOME | End: 2022-09-01
Attending: FAMILY MEDICINE | Admitting: FAMILY MEDICINE
Payer: COMMERCIAL

## 2022-09-01 DIAGNOSIS — F90.2 ADHD (ATTENTION DEFICIT HYPERACTIVITY DISORDER), COMBINED TYPE: Primary | ICD-10-CM

## 2022-09-01 DIAGNOSIS — N89.8 VAGINAL IRRITATION: ICD-10-CM

## 2022-09-01 DIAGNOSIS — R10.2 VAGINAL PAIN: ICD-10-CM

## 2022-09-01 PROCEDURE — 90791 PSYCH DIAGNOSTIC EVALUATION: CPT | Mod: GT,95 | Performed by: COUNSELOR

## 2022-09-01 ASSESSMENT — ANXIETY QUESTIONNAIRES
5. BEING SO RESTLESS THAT IT IS HARD TO SIT STILL: NOT AT ALL
4. TROUBLE RELAXING: NOT AT ALL
1. FEELING NERVOUS, ANXIOUS, OR ON EDGE: SEVERAL DAYS
2. NOT BEING ABLE TO STOP OR CONTROL WORRYING: SEVERAL DAYS
GAD7 TOTAL SCORE: 4
GAD7 TOTAL SCORE: 4
6. BECOMING EASILY ANNOYED OR IRRITABLE: NOT AT ALL
7. FEELING AFRAID AS IF SOMETHING AWFUL MIGHT HAPPEN: SEVERAL DAYS
3. WORRYING TOO MUCH ABOUT DIFFERENT THINGS: SEVERAL DAYS

## 2022-09-01 ASSESSMENT — COLUMBIA-SUICIDE SEVERITY RATING SCALE - C-SSRS
6. HAVE YOU EVER DONE ANYTHING, STARTED TO DO ANYTHING, OR PREPARED TO DO ANYTHING TO END YOUR LIFE?: NO
2. HAVE YOU ACTUALLY HAD ANY THOUGHTS OF KILLING YOURSELF IN THE PAST MONTH?: NO
5. HAVE YOU STARTED TO WORK OUT OR WORKED OUT THE DETAILS OF HOW TO KILL YOURSELF? DO YOU INTEND TO CARRY OUT THIS PLAN?: NO
3. HAVE YOU BEEN THINKING ABOUT HOW YOU MIGHT KILL YOURSELF?: NO
1. IN THE PAST MONTH, HAVE YOU WISHED YOU WERE DEAD OR WISHED YOU COULD GO TO SLEEP AND NOT WAKE UP?: NO
2. HAVE YOU ACTUALLY HAD ANY THOUGHTS OF KILLING YOURSELF LIFETIME?: NO
1. IN THE PAST MONTH, HAVE YOU WISHED YOU WERE DEAD OR WISHED YOU COULD GO TO SLEEP AND NOT WAKE UP?: NO
4. HAVE YOU HAD THESE THOUGHTS AND HAD SOME INTENTION OF ACTING ON THEM?: NO

## 2022-09-01 ASSESSMENT — PATIENT HEALTH QUESTIONNAIRE - PHQ9: SUM OF ALL RESPONSES TO PHQ QUESTIONS 1-9: 0

## 2022-09-01 ASSESSMENT — PAIN SCALES - GENERAL: PAINLEVEL: NO PAIN (0)

## 2022-09-01 NOTE — PROGRESS NOTES
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 Interrupts, Intrudes, Impulsive, Restlessness/fidgety, Hyperverbal and Hyperactive.  It is worth noted that patient reports significant functional impairments in the following: childcare or parenting; home life; organization; social interactions. Patient reports having a therapy appointment scheduled for this week and also plans to contact her psychiatrist to get back on her medications. Writer has also placed a 9035 order for ADD/ADHD testing for patient. Patient's acute suicide risk was determined to be low due to no history of suicide attempts or suicidal thoughts in the past month, thus, a safety plan was not developed. Patient is not currently under the influence of alcohol or illicit substances, denies experiencing command hallucinations or has direct access to firearms. Patient's acute risk could be higher if noncompliant with treatment plan or using illicit substances or alcohol. Patient was instructed to present to her nearest emergency room if mental health symptoms become worse or exacerbate. Protective factors include forward or future oriented thinking; dedication to family or friends; safe and stable environment; regular sleep; regular physical activity; sense of belonging; purpose; help seeking  behaviors when distressed; living with other people; daily obligations; structured day; commitment to well-being; sense of meaning; positive social skills; healthy fear of risky behaviors or pain; financial stability; strong sense of self-worth or esteem; sense of personal control or determination.    Provider Name/ Credentials:  FARHAD Wilder, [facility]   Phone: [phone]  Fax: [phone]    September 1, 2022

## 2022-09-01 NOTE — TELEPHONE ENCOUNTER
"Requested Prescriptions   Pending Prescriptions Disp Refills     estradiol (ESTRACE) 0.1 MG/GM vaginal cream 42.5 g 0     Sig: Apply pea sized amount with fingers externally to fissures nightly for one week, then 2 times per week until resolved. Apply pea sized amount into vagina nightly for 1 week then 2 times per week.       Hormone Replacement Therapy Passed - 9/1/2022  4:31 PM        Passed - Blood pressure under 140/90 in past 12 months     BP Readings from Last 3 Encounters:   06/09/22 110/66   06/08/22 108/68   04/12/22 104/64                 Passed - Recent (12 mo) or future (30 days) visit within the authorizing provider's specialty     Patient has had an office visit with the authorizing provider or a provider within the authorizing providers department within the previous 12 mos or has a future within next 30 days. See \"Patient Info\" tab in inbasket, or \"Choose Columns\" in Meds & Orders section of the refill encounter.              Passed - Medication is active on med list        Passed - Patient is 18 years of age or older        Passed - No active pregnancy on record        Passed - No positive pregnancy test on record in past 12 months           Last Written Prescription Date:  8/6/21  Last Fill Quantity: 42.5 gm,  # refills: 0   Last office visit: 6/9/2022 with provider:  Paula Atkinson   Future Office Visit:  none          "

## 2022-09-02 RX ORDER — ESTRADIOL 0.1 MG/G
CREAM VAGINAL
Qty: 42.5 G | Refills: 0 | Status: SHIPPED | OUTPATIENT
Start: 2022-09-02 | End: 2023-03-02

## 2022-09-02 NOTE — TELEPHONE ENCOUNTER
8/6/21  Acute vaginal irritation. Vaginal discharge noted. BV smear obtained. Will notify of results.   New vaginal pain. Trial vaginal estrogen at introitus for fissures. NIghtly for 1wk, then 2x/wk.   Questions answered.    Becky Corrigan Masters, [facility] [place]    Medication is being filled for 1 time refill only due to:  Patient needs to be seen because due for annual exam..  Leeann Cabrera RN on 9/2/2022 at 5:34 AM

## 2022-09-12 ENCOUNTER — VIRTUAL VISIT (OUTPATIENT)
Dept: PSYCHOLOGY | Facility: CLINIC | Age: 42
End: 2022-09-12
Payer: COMMERCIAL

## 2022-09-12 DIAGNOSIS — F41.1 GAD (GENERALIZED ANXIETY DISORDER): Primary | ICD-10-CM

## 2022-09-12 DIAGNOSIS — F90.2 ADHD (ATTENTION DEFICIT HYPERACTIVITY DISORDER), COMBINED TYPE: ICD-10-CM

## 2022-09-12 PROCEDURE — 90791 PSYCH DIAGNOSTIC EVALUATION: CPT | Mod: 95 | Performed by: PSYCHOLOGIST

## 2022-09-12 ASSESSMENT — COLUMBIA-SUICIDE SEVERITY RATING SCALE - C-SSRS
ATTEMPT LIFETIME: NO
TOTAL  NUMBER OF INTERRUPTED ATTEMPTS LIFETIME: NO
TOTAL  NUMBER OF ABORTED OR SELF INTERRUPTED ATTEMPTS LIFETIME: NO
6. HAVE YOU EVER DONE ANYTHING, STARTED TO DO ANYTHING, OR PREPARED TO DO ANYTHING TO END YOUR LIFE?: NO
2. HAVE YOU ACTUALLY HAD ANY THOUGHTS OF KILLING YOURSELF?: NO
1. HAVE YOU WISHED YOU WERE DEAD OR WISHED YOU COULD GO TO SLEEP AND NOT WAKE UP?: NO

## 2022-09-12 NOTE — PROGRESS NOTES
"    [facility]  Provider Name:  Brianna Meza     Credentials:  PsyD KIMBERLEY    PATIENT'S NAME: Janay Tam  PREFERRED NAME: Janay  PRONOUNS:   she/her    MRN: [id number]  : 1980  ADDRESS: [address]   [place] [address]  ACCT. NUMBER:  [id number]  DATE OF SERVICE: 22  START TIME: 10:00  END TIME: 10:40  PREFERRED PHONE: [phone]  May we leave a program related message: Yes  SERVICE MODALITY:  Video Visit:      Provider verified identity through the following two step process.  Patient provided:  Patient  and Patient address    Telemedicine Visit: The patient's condition can be safely assessed and treated via synchronous audio and visual telemedicine encounter.      Reason for Telemedicine Visit: Patient has requested telehealth visit    Originating Site (Patient Location): Patient's home    Distant Site (Provider Location): Provider Remote Setting- Home Office    Consent:  The patient/guardian has verbally consented to: the potential risks and benefits of telemedicine (video visit) versus in person care; bill my insurance or make self-payment for services provided; and responsibility for payment of non-covered services.     Patient would like the video invitation sent by:  My Chart    Mode of Communication:  Video Conference via [employer]    As the provider I attest to compliance with applicable laws and regulations related to telemedicine.    UNIVERSAL ADULT Mental Health DIAGNOSTIC ASSESSMENT    Identifying Information:  Patient is a 42 year old,   individual.    Patient was referred for an assessment by self.  Patient attended the session alone.    Chief Complaint:   The reason for seeking services at this time is: \"ADHD assessment\".  The problem(s) began 2022. She remarked, \" I think I've always had it when I think back to what things were like growing up.\" She stated she has a brother who is diagnosed with ADHD as well as one of her own children. " "    Patient has not attempted to resolve these concerns in the past.    Social/Family History:  Patient reported they grew up in  [place].  They were raised by biological parents  .  Parents were always together.  Patient reported that their childhood was positive and stable. She is the 6th born of 7 children in her family. She described her home as \"strict and Mormonism.\" She recalled spending a great deal of time on her own for much of her childhood. \" I was in my own world all the time.\"  Patient described their current relationships with family of origin as good.    The patient describes their cultural background as .  Cultural influences and impact on patient's life structure, values, norms, and healthcare: None.  Contextual influences on patient's health include: None. Patient identified their preferred language to be English. Patient reported they do not need the assistance of an  or other support involved in therapy.      As a child, patient reported that she failed to complete assigned chores in the home environment and forgot school work or other items between home and school. Patient reported no difficulty with childhood peer relationships.  She stated she was a quiet friendly child in school. She recalled struggling with math and stated she never did homework.  She recalled being more interested in social engagement rather than academics. She recalled talking a lot in school and having very poor handwriting. She had difficulty with organization and stated,\"I couldn't focus to study or read a book. I was always behind in reading and had to have reading and math help.\"  She went on to attend college where she studied interior design. She found that art was something she had a great interest in and had minimal difficulty focusing on.      Patient reported she had no significant delays in developmental tasks.   Patient's highest education level was college graduate  . Patient identified the " "following learning problems: attention, concentration and math.  Modifications will not be used to assist communication in therapy.   Patient reports they are able to understand written materials.    Patient did receive tutoring services during the school years. Patient did not receive special education services. Patient  reported failure to finish or complete homework. Patient did attend post secondary school.     Patient's current relationship status is  for 16 years.  Patient identified their sexual orientation as heterosexual.  Patient reported having 3 children aged 13, 11, and 7 years. Patient identified partner; adult child as part of their support system.  Patient identified the quality of these relationships as good,  .      At home she reportedly makes lists to stay on task. She stated, \"  I tend to have a push on things I want to get done and I can't put that off, but my kids interrupt me all the time. I tend to not finish a task like loading the dishes and then go do other things at same time.\" She can be forgetful and uses a calendar, which she checks often, to keep track of important events. She described herself as hyperverbal and feeling as if she is driven by a motor at times. She exercises daily to work out some of this energy, and finds she doesn't have the best day if she does not.     Patient's current living/housing situation involves staying in own home/apartment. The immediate members of family and household include Pascual, 42,  and 3 children. They report that housing is stable    Patient reports their finances are obtained through spouse. 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 She remarked, \"I have to be on my feet working. 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 I would get up a lot and go for walks. 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  " She returned to her current position in January after taking a break to raise her children. Prior to that she had worked there for 5 years. She has been terminated in the past from an office job.     Patient reported that theyhave not been involved with the legal system.  Patient does not being under probation/ parole/ jurisdiction. They are not under any current court jurisdiction. .    Patient has received a 's license.  Patient has not received any moving violations.  Patient reported the following driving habits: inattentive and speeds at times..  According to client, other people are comfortable riding as passengers when she is driving.     Patient's Strengths and Limitations:  Patient identified the following strengths or resources that will help them succeed in treatment: family support and insight. Things that may interfere with the patient's success in treatment include: none identified.     Assessments:  The following assessments were completed by patient for this visit:  PHQ9:   PHQ-9 SCORE 5/25/2017 6/1/2018 6/4/2019 5/28/2020 11/22/2021 11/22/2021 9/1/2022   PHQ-9 Total Score - - - - - - -   PHQ-9 Total Score MyChart - - - 4 (Minimal depression) - 3 (Minimal depression) -   PHQ-9 Total Score 2 0 1 4 3 3 0     GAD7:   MILA-7 SCORE 5/25/2017 6/1/2018 6/4/2019 5/28/2020 11/22/2021 11/22/2021 9/1/2022   Total Score - - - 10 (moderate anxiety) - 6 (mild anxiety) -   Total Score 7 5 5 10 6 6 4     CAGE-AID:   CAGE-AID Total Score 11/22/2021 11/22/2021 8/28/2022   Total Score 0 0 0   Total Score MyChart - 0 (A total score of 2 or greater is considered clinically significant) 0 (A total score of 2 or greater is considered clinically significant)     PROMIS 10-Global Health (only subscores and total score):   PROMIS-10 Scores Only 8/28/2022   Global Mental Health Score 11   Global Physical Health Score 18   PROMIS TOTAL - SUBSCORES 29     [place] Suicide Severity Rating Scale (Lifetime/Recent)  [place]  Suicide Severity Rating (Lifetime/Recent) 9/1/2022 9/12/2022   Wish to be Dead (Lifetime) No -   Non-Specific Active Suicidal Thoughts (Lifetime) No -   Q1 Wished to be Dead (Past Month) no -   Q2 Suicidal Thoughts (Past Month) no -   Q3 Suicidal Thought Method no -   Q4 Suicidal Intent without Specific Plan no -   Q5 Suicide Intent with Specific Plan no -   Q6 Suicide Behavior (Lifetime) no -   Level of Risk per Screen low risk -   1. Wish to be Dead (Lifetime) - 0   2. Non-Specific Active Suicidal Thoughts (Lifetime) - 0   Actual Attempt (Lifetime) - 0   Has subject engaged in non-suicidal self-injurious behavior? (Lifetime) - 0   Interrupted Attempts (Lifetime) - 0   Aborted or Self-Interrupted Attempt (Lifetime) - 0   Preparatory Acts or Behavior (Lifetime) - 0   Calculated C-SSRS Risk Score (Lifetime/Recent) - No Risk Indicated       Personal and Family Medical History:  Patient does report a family history of mental health concerns.  Patient reports family history includes Arthritis in her paternal grandmother; Breast Cancer in her maternal cousin and maternal cousin; C.A.D. in her father and maternal grandfather; Cerebrovascular Disease in her father and paternal grandmother; Depression in her mother; Diabetes in her maternal grandfather; Hyperlipidemia in her mother; Hypertension in her mother; Neurologic Disorder in her brother and sister; Osteoporosis in her paternal grandmother; Parkinsonism in her mother; Psychotic Disorder in her mother; Skin Cancer in her sister; Thyroid Disease in her mother and sister..     Patient does report Mental Health Diagnosis and/or Treatment.  Patient Patient reported the following previous diagnoses which include(s): an anxiety disorder .  Patient has received mental health services in the past:  therapy  .  Psychiatric Hospitalizations: none .  Patient denies a history of civil commitment.  She is currently engaged in psychotherapy for the past 5 years.       Patient has  had a physical exam to rule out medical causes for current symptoms.  Date of last physical exam was within the past year. Client was encouraged to follow up with PCP if symptoms were to develop. The patient has a [facility] Care Provider, who is named Eileen Barragan.  Patient reports no current medical concerns.  Patient denies any issues with pain..   There are not significant appetite / nutritional concerns / weight changes.   Patient does not report a history of head injury / trauma / cognitive impairment.        Current Outpatient Medications   Medication     estradiol (ESTRACE) 0.1 MG/GM vaginal cream     famotidine (PEPCID) 10 MG tablet     metroNIDAZOLE (FLAGYL) 500 MG tablet     MULTIVITAMINS OR TABS     Omega-3 Fatty Acids (FISH OIL PO)     sertraline (ZOLOFT) 50 MG tablet     No current facility-administered medications for this visit.     Week 2 on zoloft. She has been prescribed this in the past and found it helpful.   Medication Adherence:  taking prescribed medications as prescribed.    Patient Allergies:    Allergies   Allergen Reactions     Chicken-Derived Products (Egg) Other (See Comments)     Lac Bovis Other (See Comments)     Pcn [Penicillins]      Wheat Bran Other (See Comments)       Medical History:    Past Medical History:   Diagnosis Date     Allergic rhinitis, cause unspecified     Allergic rhinitis     Cytomegaloviral disease (H) 2010    Second child with hearing loss. 6/14 IgM neg, IgG pos     Depressive disorder, not elsewhere classified     use effexor in the past - more seasonal     Generalized anxiety disorder      GERD (gastroesophageal reflux disease)      Papanicolaou smear of cervix with atypical squamous cells of undetermined significance (ASC-US) 2001    Unsure of outcome     Postpartum depression     with first delivery, not currently medicated         Current Mental Status Exam:   Appearance:  Appropriate    Eye Contact:  Good   Psychomotor:  mildly restless       Gait  / station:  Not assessed  Attitude / Demeanor: Cooperative  Friendly  Speech      Rate / Production: Normal/ Responsive      Volume:  Normal  volume      Language:  intact  Mood:   Normal  Affect:   Appropriate    Thought Content: Clear   Thought Process: Goal Directed       Associations: No loosening of associations  Insight:   Good   Judgment:  Intact   Orientation:  All  Attention/concentration: Fair      Substance Use:  Patient did not report a family history of substance use concerns; see medical history section for details.  Patient has not received chemical dependency treatment in the past.  Patient has not ever been to detox.      Patient is not currently receiving any chemical dependency treatment.           Substance History of use Age of first use Date of last use     Pattern and duration of use (include amounts and frequency)   Alcohol used in the past   13 11/2/2019 Maybe 1 drink monthly   Cannabis   never used 19 11/18/2000 None   Amphetamines   never used        Cocaine/crack    never used          Hallucinogens never used            Inhalants never used            Heroin never used            Other Opiates never used        Benzodiazepine   never used        Barbiturates never used        Over the counter meds used in the past 16 8/28/2010    Caffeine currently use 17   3 cups black tea daily   Nicotine  never used        Other substances not listed above:  Identify:  never used          Patient reported the following problems as a result of their substance use: no problems, not applicable.    Based on the negative CAGE score and clinical interview there  are not indications of drug or alcohol abuse.      Significant Losses / Trauma / Abuse / Neglect Issues:   Patient did not  serve in the .  There are indications or report of significant loss, trauma, abuse or neglect issues related to: None.   Concerns for possible neglect are not present.     Safety Assessment:   Patient denies current  homicidal ideation and behaviors.  Patient denies current self-injurious ideation and behaviors.    Patient denied risk behaviors associated with substance use.  Patient denies any high risk behaviors associated with mental health symptoms.  Patient reports the following current concerns for their personal safety: None.  Patient reports there are not firearms in the house.       There are no firearms in the home..    History of Safety Concerns:  Patient denied a history of homicidal ideation.     Patient denied a history of personal safety concerns.    Patient denied a history of assaultive behaviors.    Patient denied a history of sexual assault behaviors.     Patient denied a history of risk behaviors associated with substance use.  Patient denies any history of high risk behaviors associated with mental health symptoms.  Patient reports the following protective factors: forward or future oriented thinking; dedication to family or friends; safe and stable environment; regular sleep; regular physical activity; sense of belonging; purpose; help seeking behaviors when distressed; living with other people; daily obligations; structured day; commitment to well being; sense of meaning; positive social skills; healthy fear of risky behaviors or pain; financial stability; strong sense of self worth or esteem; sense of personal control or determination    Risk Plan:  See Recommendations for Safety and Risk Management Plan    Review of Symptoms per patient report:  Depression: No symptoms  [place]:  No Symptoms  Psychosis: No Symptoms  Anxiety: Nervousness, Physical complaints, such as headaches, stomachaches, muscle tension and Ruminations  Panic:  No symptoms  Post Traumatic Stress Disorder:  No Symptoms   Eating Disorder: When I was a teenager adn again when I was 17 there was some bulima. No treatment it resolved.   ADD / ADHD:  Inattentive, Difficulties listening, Poor task completion, Poor organizational skills,  Distractibility, Restlessness/fidgety and Hyperverbal Feeling driven by a motor some days.   Conduct Disorder: No symptoms  Autism Spectrum Disorder: No symptoms  Obsessive Compulsive Disorder: No Symptoms    Patient reports the following compulsive behaviors and treatment history: None.   Debby always been hooible with money my  manages the bills. I wouldn't be able to get bills done on [email or web address]     Diagnostic Criteria:   Generalized Anxiety Disorder  A. Excessive anxiety and worry about a number of events or activities (such as work or school performance).    - Restlessness or feeling keyed up or on edge.    - Muscle tension.    - Sleep disturbance (difficulty falling or staying asleep, or restless unsatisfying sleep).   D. The focus of the anxiety and worry is not confined to features of an Axis I disorder.  E. The anxiety, worry, or physical symptoms cause clinically significant distress or impairment in social, occupational, or other important areas of functioning.   F. The disturbance is not due to the direct physiological effects of a substance (e.g., a drug of abuse, a medication) or a general medical condition (e.g., hyperthyroidism) and does not occur exclusively during a Mood Disorder, a Psychotic Disorder, or a Pervasive Developmental Disorder. Attention Deficit Hyperactivity Disorder  A) A persistent pattern of inattention and/or hyperactivity-impulsivity that interferes with functioning or development, as characterized by (1) Inattention and/or (2) Hyperactivity and Impulsivity  - Often fails to give close attention to details or makes careless mistakes in schoolwork, at work, or during other activities  - Often has difficulty sustaining attention in tasks or play activities  - Often does not seem to listen when spoken to directly  - Often does not follow through on instructions and fails to finish schoolwork, chores, or duties in the workplace  - Often has difficulty organizing tasks and  "activities  - Often avoids, dislikes, or is reluctant to engage in tasks that require sustained mental effort  - Is often easily distractedby extraneous stimuli  - Is often forgetful in daily activities  (2) Hyeractivity and Impulsivity: 6 or more of the following symptoms have persisted for at least 6 months to a degree that is inconsistent with developmental level and that negatively impacts directly on social and academic/occupational activities:  - Often fidgets with or taps hands or feet or squirms in seat  - Often leaves seat in situations when remaining seated is expected  - Is often \"on the go,\" acting as if \"driven by a motor\"  - Often talks excessively  B) Several inattentive or hyperactive-impulsive symptoms were present prior to age 11 years  D) There is clear evidence that the symptoms interfere with, or reduce the quality of, social academic, or occupational functioning  E) The Symptoms do not occur exclusively during the course of schizophrenia or another psychotic disorder and are not better explained by another mental disorder      Functional Status:  Patient reports the following functional impairments:  academic performance, management of the household and or completion of tasks, money management and social interactions.         Clinical Summary:  1. Reason for assessment: ADHD evaluation  .  2. Psychosocial, Cultural and Contextual Factors: None  .  3. Principal DSM5 Diagnoses  (Sustained by DSM5 Criteria Listed Above):   ADHD combined type.  4. Other Diagnoses that is relevant to services:   MILA.  5. Provisional Diagnosis:  ADHD, combined type   6. Prognosis: Expect Improvement.  7. Likely consequences of symptoms if not treated: exacerbation of anxiety.  8. Client strengths include:  educated, employed, has a previous history of therapy, insightful, intelligent, motivated and support of family, friends and providers .     Recommendations:     1. Plan for Safety and Risk Management:   Safety and " Risk: Recommended that patient call 911 or go to the local ED should there be a change in any of these risk factors..          Report to child / adult protection services was NA.     2. Patient's identified no cultural or contextual concerns.     3. Initial Treatment will focus on:    ADHD Testing:  Patient was given self and collaborative rating scales to be completed prior to the next appointment.  Depression and anxiety rating scales will be completed.  Copies of no outside reports were requested. .     4. Resources/Service Plan:    services are not indicated.   Modifications to assist communication are not indicated.   Additional disability accommodations are not indicated.      5. Collaboration:   Collaboration / coordination of treatment will be initiated with the following  support professionals: none at this time.      6.  Referrals:   The following referral(s) will be initiated: none at this time. Next Scheduled Appointment: tbd.     A Release of Information has been obtained for the following: none at this time.     Emergency Contact  Is on file in medical chart     7. TJ:    TJ: N/A    8. Records:   These were reviewed at time of assessment.   Information in this assessment was obtained from the medical record and provided by patient who is a good historian.    Patient will have open access to their mental health medical record.        Provider Name/ Credentials:  Brianna Meza PsyD LP  September 12, 2022

## 2022-10-10 ENCOUNTER — MYC MEDICAL ADVICE (OUTPATIENT)
Dept: OBGYN | Facility: CLINIC | Age: 42
End: 2022-10-10

## 2022-10-17 ENCOUNTER — FCC EXTENDED DOCUMENTATION (OUTPATIENT)
Dept: PSYCHOLOGY | Facility: CLINIC | Age: 42
End: 2022-10-17

## 2022-10-17 NOTE — PROGRESS NOTES
"[facility]  Patient: Janay Tam  YOB: 1980  MRN: [id number]  Date(s) of assessment: 9/12/22; 10/12/22    Information about appointment:  Client attended two  sessions to aid in determining client's mental health diagnosis or diagnoses and treatment recommendations that best address client concerns. Client records includingmedical were reviewed. A diagnostic assessment was conducted at the initial appointment. Client completed several rating scales to assist in assessing attention-related and other mental health symptoms that may be causing impairments in functioning. Rating scales were also completed by a collateral contact.    Assessment tools:      [place] Adult ADHD Rating Scale-IV: Self and Other Reports (BAARS-IV), [place] Functional Impairment Scale: Self and Other Reports (BFIS), [place] Deficits in Executive Functioning Scale: Self and Other Reports (BDEFS), Patient Health Questionnaire-9 (PHQ-9), Generalized Anxiety Disorder-7 (MILA-7) and CNS Neurocognitve Assessment    Assessment Results:    [place] Adult ADHD Rating Scale-IV: Self and Other Reports (BAARS-IV)  The BAARS-IV assesses for symptoms of ADHD that are experienced in one's daily life. This assessment measure includes self and collateral rating scales designed to provide information regarding current and childhood symptoms of ADHD including inattention, hyperactivity, and impulsivity. Self-report scores are reported as percentiles. Scores at the 76th-83rd percentile are considered marginal, scores at the 84th-92nd percentile are considered borderline, scores at the 93rd-95th percentile are considered mild, scores at the 96th-98th percentile are considered moderate, and those at the 99th percentile are considered severe. Collateral or \"other\" rating scales are reported as number of symptoms observed in comparison to those reported by the client. Norms and percentile scores are not available for collateral reports. "     Current Symptoms Scale--Self Report:   Client completed the self-report inventory of current symptoms. The results indicate that the client's Total ADHD Score was 51 which places her in the 98th percentile for overall ADHD symptoms. In addition, the client endorsed 9/9 (99th percentile) Inattention symptoms, 4/5 (91st percentile) Hyperactivity symptoms, 4/4 (99th percentile) Impulsivity symptoms, and 4/9 (76th percentile) Sluggish Cognitive Tempo symptoms. Client indicated that the reported symptoms have resulted in impaired functioning in school, work and social relationships. Overall, the results suggest the client is expeiencing Moderate ADHD symptoms. She stated her symptoms began around the age of 9 years old.     Current Symptoms Scale--Other Report:  Client's spouse completed the collateral report inventory of current symptoms. Based on the collateral contact's observation of symptoms, the client demonstrates 5/9 Inattention symptoms, 3/5 Hyperactivity symptoms, 2/4 Impulsivity symptoms, and 5/9 Sluggish Cognitive Tempo symptoms. The client's Total ADHD Score was 31. The collateral contact indicated the client demonstrates impaired functioning in home and social relationships} The collateral- and self-report scores are significantly different.     Childhood Symptoms Scale--Self-Report:  Client completed the self-report inventory of childhood symptoms. The results indicate that the client's Total ADHD Score was 30 which places her in the 75th percentile for overall ADHD symptoms in childhood. In addition, the client endorsed 7/9 (90th percentile) Inattention symptoms and  1/9 (50th percentile) Hyperactivity-Impulsivity symptoms. Client indicated that the reported symptoms resulted in impaired functioning in school Overall, the results suggest the client experienced  Borderline symptoms of ADHD as a child.     Childhood Symptoms Scale--Other Report:  Information not available.                         "  [place] Functional Impairment Scale: Self and Other Reports (BFIS)  The BFIS is used to assess an individuals' psychosocial impairment in major life/daily activities that may be due to a mental health disorder. This assessment measure includes self and collateral rating scales. Self-report scores are reported as percentiles. Scores at the 76th-83rd percentile are considered marginal, scores at the 84th-92nd percentile are considered borderline, scores at the 93rd-95th percentile are considered mild, scores at the 96th-98th percentile are considered moderate, and those at the 99th percentile are considered severe.Collateral or \"other\" rating scales are reported as number of symptoms observed in comparison to those reported by the client. Norms and percentile scores are not available for collateral reports.     Results indicate the client identified impairment (scores at or greater than 93rd percentile) in the following areas: money management The client's Mean Impairment Score was 3.1 (51st percentile) indicating the client is reporting Marginal impairment in functioning across domains. Client's spouse completed the collateral rating scale, which indicated similar results. The collateral contact's scores were generally lower than the client's report.     [place]han Deficits in Executive Functioning Scale (BDEFS)  The BDEFS is a measure used for evaluating dimensions of adult executive functioning in daily life.This assessment measure includes self and collateral rating scales. Self-report scores are reported as percentiles. Scores at the 76th-83rd percentile are considered marginal, scores at the 84th-92nd percentile are considered borderline, scores at the 93rd-95th percentile are considered mild, scores at the 96th-98th percentile are considered moderate, and those at the 99th percentile are considered severe.Collateral or \"other\" rating scales are reported as number of symptoms observed in comparison to those " reported by the client. Norms and percentile scores are not available for collateral reports.     Results indicate the client's Total Executive Functioning Score was 203  (93rd percentile). The ADHD-Executive Functioning Index score was 22 (89thpercentile). These scores suggest the client has Mild deficits in executive functioning. These deficits are likely to be due to ADHD. Results indicate the client identified significant deficits in the following areas: self-organization/problem-solving 98th percentile,  self-restraint 93rd percentile and self-regulation of emotions 93rd percentile. Client's spouse completed the collateral rating scale, which indicated similar results. The collateral contact's scores were generally lower than the client's report.      Generalized Anxiety Disorder Questionnaire (MILA-7)  This questionnaire is designed to assess for anxiety in adults.  Based on the score, she is not experiencing clinically significant symptoms of anxiety.     Patient Health Questionnaire- 9 (PHQ-9)   This questionnaire is designed to assess for depression in adults.  Based on the score, she is not experiencing clinically significant symptoms of depression.     CNS Vital Signs Neurocognitive Battery  The CNS Vital Signs Neurocognitive Battery is a remotely-administered assessment comprised of seven core subtests to individually measure the patient's verbal memory, visual memory, motor speed, psychomotor speed, reaction time, focus, ability to sustain attention and ability to adapt to changing rules and tasks.      Above average domain scores indicate a standard score (SS) greater than 109 or a Percentile Rank (NV) greater than 74, indicating a high functioning test subject. Average is a SS  or NV 25-74, indicating normal function. Low Average is a SS 80-89 or NV 9-24 indicating a slight deficit or impairment. Below Average is a SS 70-79 or NV 2-8, indicating a moderate level of deficit or impairment. Very Low  is a SS less than 70 or a NM less than 2, indicating a deficit and impairment.  Validity Indicator denotes a guideline for representing the possibility of an invalid test or domain score, and can be influenced by patient understanding, effort, or other conditions.      Neurocognitive Index (NCI): Measures an average score derived from the domain scores or a general assessment of the overall neurocognitive status of the patient. The patient's NCI score is 90, with a percentile of 25, and falls within the average range.    Composite Memory: Measures how well subject can recognize, remember, and retrieve words and geometric figures, and is comprised of the Visual and Verbal Memory domains. The patient's Composite Memory score is 102, with a percentile of 55, and falls within the average range.    Verbal Memory: Measures how well subject can recognize, remember, and retrieve words. The patient's Verbal Memory score is 99, with a percentile of 47, and falls within the average range.    Visual Memory: Measures how well subject can recognize, remember and retrieve geometric figures. The patient's Visual Memory score is 104, with a percentile of 61, and falls within the average range.    Psychomotor Speed: Measures how well a subject perceives, attends, responds to complex visual-perceptual information and performs simple fine motor coordination, and is comprised of the Motor Speed and Processing Speed indexes. The patient's Psychomotor Speed score is 81, with a percentile of 10, and falls within the low average range.    Reaction Time: Measures how quickly the subject can react, in milliseconds, to a simple and increasingly complex direction set. The patient's Reaction Time score is 91, with a percentile of 27, and falls within the average range.    Complex Attention: Measures the ability to track and respond to a variety of stimuli over lengthy periods of time and/or perform complex mental tasks requiring vigilance  quickly and accurately. The patient's Complex Attention score is 91, with a percentile of 27, and falls within the average range.    Cognitive Flexibility: Measures how well subject is able to adapt to rapidly changing and increasingly complex set of directions and/or to manipulate the information. The patient's Cognitive Flexibility score is 87, with a percentile of 19, and falls within the low average range.    Processing Speed: Measures how well a subject recognizes and processes information i.e., perceiving, attending/responding to incoming information, motor speed, fine motor coordination, and visual-perceptual ability. The patient's Processing Speed score is 90, with a percentile of 25, and falls within the average range.    Executive Function: Measures how well a subject recognizes rules, categories, and manages or navigates rapid decision making. The patient's Executive Function score is 91, with a percentile of 27, and falls within the average range.    Simple Attention: Measures the ability to track and respond to a single defined stimulus over lengthy periods of time while performing vigilance and response inhibition quickly and accurately to a simple task. The patient's Simple Attention score is 106, with a percentile of 66, and falls within the average range.    Motor Speed: Measure: Ability to perform simple movements to produce and satisfy an intention towards a manual action and goal. The patient's Motor Speed score is 81, with a percentile of 10, and falls within the low average range.        Summary : Overall, the results suggest the client is expeiencing Moderate ADHD symptoms. She stated her symptoms began around the age of 9 years old. The client identified significant impairment  in the following areas: money management The client's Mean Impairment Score indicates the client is reporting Marginal impairment in functioning in this area. The ADHD-Executive Functioning Index score  suggest the  client has Mild deficits in executive functioning. These deficits are likely to be due to ADHD. Results indicate the client identified significant deficits in the following areas: self-organization/problem-solving,  self-restraint and self-regulation of emotions. On measures of neurocognitive functioning the client's performance ranged from average to low average. It is likely that the client has found ways to compensate for deficits attributable to ADHD over her lifetime.       Diagnosis:     ADHD, inattentive      Recommendations:    Schedule an appointment with your physician to discuss a medication evaluation. and Consider working with an ADHD  or individual therapist to learn skills to  assist with symptom management, as well as ways to improve relationships,  etc that may have been impacted by your symptoms.     JOSETTE KHAN PsyD

## 2022-10-18 ENCOUNTER — VIRTUAL VISIT (OUTPATIENT)
Dept: PSYCHOLOGY | Facility: CLINIC | Age: 42
End: 2022-10-18
Payer: COMMERCIAL

## 2022-10-18 DIAGNOSIS — F90.2 ADHD (ATTENTION DEFICIT HYPERACTIVITY DISORDER), COMBINED TYPE: Primary | ICD-10-CM

## 2022-10-18 PROCEDURE — 96131 PSYCL TST EVAL PHYS/QHP EA: CPT | Mod: 95 | Performed by: PSYCHOLOGIST

## 2022-10-18 PROCEDURE — 96130 PSYCL TST EVAL PHYS/QHP 1ST: CPT | Mod: 95 | Performed by: PSYCHOLOGIST

## 2022-10-18 NOTE — PROGRESS NOTES
[facility]  Patient: Janay Tam  YOB: 1980  MRN: [id number]  Date(s) of assessment: 9/12/22; 10/12/22    START TIME: 9:00  END TIME: 9:10  PREFERRED PHONE: [phone]  May we leave a program related message: Yes  SERVICE MODALITY:  Video Visit:      Provider verified identity through the following two step process.  Patient provided:  Patient is known previously to provider    Telemedicine Visit: The patient's condition can be safely assessed and treated via synchronous audio and visual telemedicine encounter.      Reason for Telemedicine Visit: Patient has requested telehealth visit    Originating Site (Patient Location): Patient's home    Distant Site (Provider Location): [facility] [place]    Consent:  The patient/guardian has verbally consented to: the potential risks and benefits of telemedicine (video visit) versus in person care; bill my insurance or make self-payment for services provided; and responsibility for payment of non-covered services.     Patient would like the video invitation sent by:  My Chart    Mode of Communication:  Video Conference via Enish    As the provider I attest to compliance with applicable laws and regulations related to telemedicine.       Information about appointment:  Client attended two  sessions to aid in determining client's mental health diagnosis or diagnoses and treatment recommendations that best address client concerns. Client records includingmedical were reviewed. A diagnostic assessment was conducted at the initial appointment. Client completed several rating scales to assist in assessing attention-related and other mental health symptoms that may be causing impairments in functioning. Rating scales were also completed by a collateral contact.     Assessment tools:       [place] Adult ADHD Rating Scale-IV: Self and Other Reports (BAARS-IV), [place] Functional Impairment Scale: Self and  "Other Reports (BFIS), [place] Deficits in Executive Functioning Scale: Self and Other Reports (BDEFS), Patient Health Questionnaire-9 (PHQ-9), Generalized Anxiety Disorder-7 (MILA-7) and CNS Neurocognitve Assessment     Assessment Results:     [place] Adult ADHD Rating Scale-IV: Self and Other Reports (BAARS-IV)  The BAARS-IV assesses for symptoms of ADHD that are experienced in one's daily life. This assessment measure includes self and collateral rating scales designed to provide information regarding current and childhood symptoms of ADHD including inattention, hyperactivity, and impulsivity. Self-report scores are reported as percentiles. Scores at the 76th-83rd percentile are considered marginal, scores at the 84th-92nd percentile are considered borderline, scores at the 93rd-95th percentile are considered mild, scores at the 96th-98th percentile are considered moderate, and those at the 99th percentile are considered severe. Collateral or \"other\" rating scales are reported as number of symptoms observed in comparison to those reported by the client. Norms and percentile scores are not available for collateral reports.      Current Symptoms Scale--Self Report:   Client completed the self-report inventory of current symptoms. The results indicate that the client's Total ADHD Score was 51 which places her in the 98th percentile for overall ADHD symptoms. In addition, the client endorsed 9/9 (99th percentile) Inattention symptoms, 4/5 (91st percentile) Hyperactivity symptoms, 4/4 (99th percentile) Impulsivity symptoms, and 4/9 (76th percentile) Sluggish Cognitive Tempo symptoms. Client indicated that the reported symptoms have resulted in impaired functioning in school, work and social relationships. Overall, the results suggest the client is expeiencing Moderate ADHD symptoms. She stated her symptoms began around the age of 9 years old.      Current Symptoms Scale--Other Report:  Client's spouse completed the " "collateral report inventory of current symptoms. Based on the collateral contact's observation of symptoms, the client demonstrates 5/9 Inattention symptoms, 3/5 Hyperactivity symptoms, 2/4 Impulsivity symptoms, and 5/9 Sluggish Cognitive Tempo symptoms. The client's Total ADHD Score was 31. The collateral contact indicated the client demonstrates impaired functioning in home and social relationships} The collateral- and self-report scores are significantly different.      Childhood Symptoms Scale--Self-Report:  Client completed the self-report inventory of childhood symptoms. The results indicate that the client's Total ADHD Score was 30 which places her in the 75th percentile for overall ADHD symptoms in childhood. In addition, the client endorsed 7/9 (90th percentile) Inattention symptoms and  1/9 (50th percentile) Hyperactivity-Impulsivity symptoms. Client indicated that the reported symptoms resulted in impaired functioning in school Overall, the results suggest the client experienced  Borderline symptoms of ADHD as a child.      Childhood Symptoms Scale--Other Report:  Information not available.                          [place] Functional Impairment Scale: Self and Other Reports (BFIS)  The BFIS is used to assess an individuals' psychosocial impairment in major life/daily activities that may be due to a mental health disorder. This assessment measure includes self and collateral rating scales. Self-report scores are reported as percentiles. Scores at the 76th-83rd percentile are considered marginal, scores at the 84th-92nd percentile are considered borderline, scores at the 93rd-95th percentile are considered mild, scores at the 96th-98th percentile are considered moderate, and those at the 99th percentile are considered severe.Collateral or \"other\" rating scales are reported as number of symptoms observed in comparison to those reported by the client. Norms and percentile scores are not available for " "collateral reports.      Results indicate the client identified impairment (scores at or greater than 93rd percentile) in the following areas: money management The client's Mean Impairment Score was 3.1 (51st percentile) indicating the client is reporting Marginal impairment in functioning across domains. Client's spouse completed the collateral rating scale, which indicated similar results. The collateral contact's scores were generally lower than the client's report.     [place] Deficits in Executive Functioning Scale (BDEFS)  The BDEFS is a measure used for evaluating dimensions of adult executive functioning in daily life.This assessment measure includes self and collateral rating scales. Self-report scores are reported as percentiles. Scores at the 76th-83rd percentile are considered marginal, scores at the 84th-92nd percentile are considered borderline, scores at the 93rd-95th percentile are considered mild, scores at the 96th-98th percentile are considered moderate, and those at the 99th percentile are considered severe.Collateral or \"other\" rating scales are reported as number of symptoms observed in comparison to those reported by the client. Norms and percentile scores are not available for collateral reports.      Results indicate the client's Total Executive Functioning Score was 203  (93rd percentile). The ADHD-Executive Functioning Index score was 22 (89thpercentile). These scores suggest the client has Mild deficits in executive functioning. These deficits are likely to be due to ADHD. Results indicate the client identified significant deficits in the following areas: self-organization/problem-solving 98th percentile,  self-restraint 93rd percentile and self-regulation of emotions 93rd percentile. Client's spouse completed the collateral rating scale, which indicated similar results. The collateral contact's scores were generally lower than the client's report.        Generalized Anxiety Disorder " Questionnaire (MILA-7)  This questionnaire is designed to assess for anxiety in adults.  Based on the score, she is not experiencing clinically significant symptoms of anxiety.      Patient Health Questionnaire- 9 (PHQ-9)   This questionnaire is designed to assess for depression in adults.  Based on the score, she is not experiencing clinically significant symptoms of depression.      CNS Vital Signs Neurocognitive Battery  The CNS Vital Signs Neurocognitive Battery is a remotely-administered assessment comprised of seven core subtests to individually measure the patient's verbal memory, visual memory, motor speed, psychomotor speed, reaction time, focus, ability to sustain attention and ability to adapt to changing rules and tasks.       Above average domain scores indicate a standard score (SS) greater than 109 or a Percentile Rank (WV) greater than 74, indicating a high functioning test subject. Average is a SS  or WV 25-74, indicating normal function. Low Average is a SS 80-89 or WV 9-24 indicating a slight deficit or impairment. Below Average is a SS 70-79 or WV 2-8, indicating a moderate level of deficit or impairment. Very Low is a SS less than 70 or a WV less than 2, indicating a deficit and impairment.  Validity Indicator denotes a guideline for representing the possibility of an invalid test or domain score, and can be influenced by patient understanding, effort, or other conditions.       Neurocognitive Index (NCI): Measures an average score derived from the domain scores or a general assessment of the overall neurocognitive status of the patient. The patient's NCI score is 90, with a percentile of 25, and falls within the average range.     Composite Memory: Measures how well subject can recognize, remember, and retrieve words and geometric figures, and is comprised of the Visual and Verbal Memory domains. The patient's Composite Memory score is 102, with a percentile of 55, and falls within the  average range.     Verbal Memory: Measures how well subject can recognize, remember, and retrieve words. The patient's Verbal Memory score is 99, with a percentile of 47, and falls within the average range.     Visual Memory: Measures how well subject can recognize, remember and retrieve geometric figures. The patient's Visual Memory score is 104, with a percentile of 61, and falls within the average range.     Psychomotor Speed: Measures how well a subject perceives, attends, responds to complex visual-perceptual information and performs simple fine motor coordination, and is comprised of the Motor Speed and Processing Speed indexes. The patient's Psychomotor Speed score is 81, with a percentile of 10, and falls within the low average range.     Reaction Time: Measures how quickly the subject can react, in milliseconds, to a simple and increasingly complex direction set. The patient's Reaction Time score is 91, with a percentile of 27, and falls within the average range.     Complex Attention: Measures the ability to track and respond to a variety of stimuli over lengthy periods of time and/or perform complex mental tasks requiring vigilance quickly and accurately. The patient's Complex Attention score is 91, with a percentile of 27, and falls within the average range.     Cognitive Flexibility: Measures how well subject is able to adapt to rapidly changing and increasingly complex set of directions and/or to manipulate the information. The patient's Cognitive Flexibility score is 87, with a percentile of 19, and falls within the low average range.     Processing Speed: Measures how well a subject recognizes and processes information i.e., perceiving, attending/responding to incoming information, motor speed, fine motor coordination, and visual-perceptual ability. The patient's Processing Speed score is 90, with a percentile of 25, and falls within the average range.     Executive Function: Measures how well a  subject recognizes rules, categories, and manages or navigates rapid decision making. The patient's Executive Function score is 91, with a percentile of 27, and falls within the average range.     Simple Attention: Measures the ability to track and respond to a single defined stimulus over lengthy periods of time while performing vigilance and response inhibition quickly and accurately to a simple task. The patient's Simple Attention score is 106, with a percentile of 66, and falls within the average range.     Motor Speed: Measure: Ability to perform simple movements to produce and satisfy an intention towards a manual action and goal. The patient's Motor Speed score is 81, with a percentile of 10, and falls within the low average range.         Summary : Overall, the results suggest the client is expeiencing Moderate ADHD symptoms. She stated her symptoms began around the age of 9 years old. The client identified significant impairment  in the following areas: money management. The client's Mean Impairment Score indicates the client is reporting Marginal impairment in functioning in this area. The ADHD-Executive Functioning Index score  suggest the client has Mild deficits in executive functioning. These deficits are likely to be due to ADHD. Results indicate the client identified significant deficits in the following areas: self-organization/problem-solving,  self-restraint and self-regulation of emotions. On measures of neurocognitive functioning the client's performance ranged from average to low average. It is likely that the client has found ways to compensate for deficits attributable to ADHD over her lifetime.         Diagnosis:      ADHD, combined type        Recommendations:     Schedule an appointment with your physician to discuss a medication evaluation. and Consider working with an ADHD  or individual therapist to learn skills to  assist with symptom management, as well as ways to improve  relationships,  etc that may have been impacted by your symptoms.      JOSETTE KHAN PsyD    Psychological Testing   Billing/Services Summary       Testing Evaluation Services Base: 44088  (1st 60 mins) Add-on: 26931  (each addtl 60 mins)   Record Review and Clarify Referral Question   9/12/22; 8:00-8:10 10 minutes               Integration/Report Generation   10/13/22; 1:00-2:30 ([facility] and [facility]) 90 minutes   Interactive Feedback Session  10/18/2022; 9:00-9:10 10 minutes   Post-Service Work   10/18/2022; 9:10-9:20 10 minutes   Total Time: 120 minutes (2 hours, 0 minutes)   Total Units: 1 1                                 Diagnosis(es): ADHD Combined type (F90.2)

## 2022-10-31 ENCOUNTER — VIRTUAL VISIT (OUTPATIENT)
Dept: BEHAVIORAL HEALTH | Facility: CLINIC | Age: 42
End: 2022-10-31
Payer: COMMERCIAL

## 2022-10-31 DIAGNOSIS — F90.2 ADHD (ATTENTION DEFICIT HYPERACTIVITY DISORDER), COMBINED TYPE: Primary | ICD-10-CM

## 2022-10-31 PROCEDURE — 90834 PSYTX W PT 45 MINUTES: CPT | Mod: 95 | Performed by: SOCIAL WORKER

## 2022-10-31 ASSESSMENT — COLUMBIA-SUICIDE SEVERITY RATING SCALE - C-SSRS
2. HAVE YOU ACTUALLY HAD ANY THOUGHTS OF KILLING YOURSELF IN THE PAST MONTH?: NO
6. HAVE YOU EVER DONE ANYTHING, STARTED TO DO ANYTHING, OR PREPARED TO DO ANYTHING TO END YOUR LIFE?: NO
4. HAVE YOU HAD THESE THOUGHTS AND HAD SOME INTENTION OF ACTING ON THEM?: NO
1. IN THE PAST MONTH, HAVE YOU WISHED YOU WERE DEAD OR WISHED YOU COULD GO TO SLEEP AND NOT WAKE UP?: NO
5. HAVE YOU STARTED TO WORK OUT OR WORKED OUT THE DETAILS OF HOW TO KILL YOURSELF? DO YOU INTEND TO CARRY OUT THIS PLAN?: NO
3. HAVE YOU BEEN THINKING ABOUT HOW YOU MIGHT KILL YOURSELF?: NO

## 2022-10-31 NOTE — PROGRESS NOTES
[facility]   Mental Health & Addiction Services     Progress Note - Initial Visit    Patient  Name:  Janay Tam Date: 10/31/22           Service Type: Individual     Visit Start Time: 1300  Visit End Time: 1352    Visit #: 1    Attendees: Client    Service Modality:  Video Visit:      Provider verified identity through the following two step process.  Patient provided:  Patient     Telemedicine Visit: The patient's condition can be safely assessed and treated via synchronous audio and visual telemedicine encounter.      Reason for Telemedicine Visit: Patient has requested telehealth visit    Originating Site (Patient Location): Patient's home    Distant Site (Provider Location): Provider Remote Setting- Home Office    Consent:  The patient/guardian has verbally consented to: the potential risks and benefits of telemedicine (video visit) versus in person care; bill my insurance or make self-payment for services provided; and responsibility for payment of non-covered services.     Patient would like the video invitation sent by:  Send to e-mail at: [email or web address]    Mode of Communication:  Video Conference via Amwell    Distant Location (Provider):  Off-site    As the provider I attest to compliance with applicable laws and rylie   Patient expressed comprehension and acceptance of informed consent and the nature of limitations to confidentiality due to the mandated reporting requirement that was reviewed during session.        DATA:  Interactive Complexity: No  Crisis: No     Presenting Concerns/  Current Stressors:  Patient is a 42-year-old  female who was referred by Brianna Meza, PhD after completing ADHD testing.  Patient stated that having a 12-year-old son with ADHD prompted her to seek evaluation.  Patient is .  They have 3 boys ages 14, 12 and 7.  Patient is receiving psychotherapy through [facility] to address issues related to son's ADHD and ASD.  Patient  recently prescribed Effexor and Ritalin through [facility].  Patient is hoping to gain knowledge and skills for the management of her newly diagnosed ADHD.    ASSESSMENT:  Mental Status Assessment:  Appearance:   Appropriate   Eye Contact:   Good   Psychomotor Behavior: Agitated   Attitude:   Cooperative   Orientation:   All  Speech   Rate / Production: Normal/ Responsive   Volume:  Normal   Mood:    Normal  Affect:    Appropriate   Thought Content:  Clear   Thought Form:  Coherent   Insight:    Good   MILA-7 SCORE 11/22/2021 11/22/2021 9/1/2022   Total Score - 6 (mild anxiety) -   Total Score 6 6 4       PHQ 11/22/2021 11/22/2021 9/1/2022   PHQ-9 Total Score 3 3 0   Q9: Thoughts of better off dead/self-harm past 2 weeks Not at all Not at all Not at all         Safety Issues and Plan for Safety and Risk Management:     [place] Suicide Severity Rating Scale (Lifetime/Recent)  [place] Suicide Severity Rating (Lifetime/Recent) 9/1/2022 9/12/2022 10/31/2022   Wish to be Dead (Lifetime) No - -   Non-Specific Active Suicidal Thoughts (Lifetime) No - -   Q1 Wished to be Dead (Past Month) no - no   Q2 Suicidal Thoughts (Past Month) no - no   Q3 Suicidal Thought Method no - no   Q4 Suicidal Intent without Specific Plan no - no   Q5 Suicide Intent with Specific Plan no - no   Q6 Suicide Behavior (Lifetime) no - no   Level of Risk per Screen low risk - low risk   1. Wish to be Dead (Lifetime) - 0 -   2. Non-Specific Active Suicidal Thoughts (Lifetime) - 0 -   Actual Attempt (Lifetime) - 0 -   Has subject engaged in non-suicidal self-injurious behavior? (Lifetime) - 0 -   Interrupted Attempts (Lifetime) - 0 -   Aborted or Self-Interrupted Attempt (Lifetime) - 0 -   Preparatory Acts or Behavior (Lifetime) - 0 -   Calculated C-SSRS Risk Score (Lifetime/Recent) - No Risk Indicated -     Patient denies current fears or concerns for personal safety.  Patient denies current or recent suicidal ideation or behaviors.  Patient denies  "current or recent homicidal ideation or behaviors.  Patient denies current or recent self injurious behavior or ideation.  Patient denies other safety concerns.  Recommended that patient call 911 or go to the local ED should there be a change in any of these risk factors.  Patient reports there are no firearms in the house.     Diagnostic Criteria:  A) A persistent pattern of inattention and/or hyperactivity-impulsivity that interferes with functioning or development, as characterized by (1) Inattention and/or (2) Hyperactivity and Impulsivity  - Often fails to give close attention to details or makes careless mistakes in schoolwork, at work, or during other activities  - Often has difficulty sustaining attention in tasks or play activities  - Often does not seem to listen when spoken to directly  - Often does not follow through on instructions and fails to finish schoolwork, chores, or duties in the workplace  - Often has difficulty organizing tasks and activities  - Often avoids, dislikes, or is reluctant to engage in tasks that require sustained mental effort  - Is often easily distractedby extraneous stimuli  - Is often forgetful in daily activities  (2) Hyeractivity and Impulsivity: 6 or more of the following symptoms have persisted for at least 6 months to a degree that is inconsistent with developmental level and that negatively impacts directly on social and academic/occupational activities:  - Often fidgets with or taps hands or feet or squirms in seat  - Often leaves seat in situations when remaining seated is expected  - Is often \"on the go,\" acting as if \"driven by a motor\"  - Often talks excessively  B) Several inattentive or hyperactive-impulsive symptoms were present prior to age 11 years  D) There is clear evidence that the symptoms interfere with, or reduce the quality of, social academic, or occupational functioning  E) The Symptoms do not occur exclusively during the course of schizophrenia or " another psychotic disorder and are not better explained by another mental disorder      DSM5 Diagnoses: (Sustained by DSM5 Criteria Listed Above)  Diagnoses: ADHD combined type  Psychosocial & Contextual Factors:   WHODAS 2.0 (12 item):   WHODAS 2.0 Total Score 11/18/2021 8/28/2022   Total Score 14 16   Total Score MyChart 14 16     Intervention:   ADHD education and cognitive behavioral therapy.  Collateral Reports Completed:  Routed note to PCP      PLAN: (Homework, other):  1. Provider will continue Diagnostic Assessment.  Patient was given the following to do until next session: Anxiety worksheets    2. Provider recommended the following referrals: None at this time    3.  Suicide Risk and Safety Concerns were assessed for Janay Dozier [facility] October 31, 2022

## 2023-02-21 ENCOUNTER — TRANSFERRED RECORDS (OUTPATIENT)
Dept: HEALTH INFORMATION MANAGEMENT | Facility: CLINIC | Age: 43
End: 2023-02-21
Payer: COMMERCIAL

## 2023-02-27 ENCOUNTER — TELEPHONE (OUTPATIENT)
Dept: OBGYN | Facility: CLINIC | Age: 43
End: 2023-02-27
Payer: COMMERCIAL

## 2023-02-27 NOTE — TELEPHONE ENCOUNTER
Reason for call:  Pt was seen at urgent care 2/25 for left flank pain - wondering if she should be following up w/ Dr. Raya or a different speciality. Pt does have an annual scheduled for Thurs. 3/2    Phone number to reach patient:  [phone]    Best Time:  Anytime    Can we leave a detailed message on this number?  YES    Travel screening: Not Applicable

## 2023-02-27 NOTE — PROGRESS NOTES
Janay is a 42 year old  female who presents for annual exam.     Besides routine health maintenance, she has no other health concerns today .    HPI:  The patient's PCP is Eileen Barragan DO.      Had mammogram at [facility] in the fall, was normal.     Periods are regular q 25-29 days.     Would like TSH checked, maternal family hisotry of thyroid disease.    On effexor 25mg, prescribed by [facility].     Not using vaginal estrogen. Not having issues.  Uses triamcinolone on occ       GYNECOLOGIC HISTORY:    Patient's last menstrual period was 2023 (exact date).    Regular menses? yes  Menses every 28 days.  Length of menses: 4 days    Her current contraception method is: none.  She  reports that she has never smoked. She has never used smokeless tobacco.    Patient is sexually active.  STD testing offered?  Declined  Last PHQ-9 score on record =   PHQ-9 SCORE 3/2/2023   PHQ-9 Total Score -   PHQ-9 Total Score MyChart -   PHQ-9 Total Score 1   Some encounter information is confidential and restricted. Go to Review Flowsheets activity to see all data.     Last GAD7 score on record =   MILA-7 SCORE 3/2/2023   Total Score -   Total Score 2   Some encounter information is confidential and restricted. Go to Review Flowsheets activity to see all data.     Alcohol Score = 0    HEALTH MAINTENANCE:  Cholesterol:   Cholesterol   Date Value Ref Range Status   2005 148 0 - 200 mg/dL Final     Comment:     Cholesterol Reference Range:   <200  The NCEP recommends further         evaluation of:         1.  Patients with cholesterol             greater than 200 mg/dL             if additional risk factors             are present.         2.  All patients with a             cholesterol greater than             240 mg/dL.     Last Mammo: 12/15/21 diagnostic, US, Result: Normal, Next Mammo: Due at age due  Pap:  Lab Results   Component Value Date    PAP NIL, HPV NEG 2021    PAP NIL 2017    PAP NIL  2014     Colonoscopy:  NA, Result: Not applicable, Next Colonoscopy: 44 years.  Dexa:  NA    Health maintenance updated:  yes    HISTORY:  OB History    Para Term  AB Living   3 3 3 0 0 3   SAB IAB Ectopic Multiple Live Births   0 0 0 0 3      # Outcome Date GA Lbr John/2nd Weight Sex Delivery Anes PTL Lv   3 Term 02/10/15 38w5d 01:15 / 03:05 3.912 kg (8 lb 10 oz) M Vag-Spont EPI  EDU      Apgar1: 8  Apgar5: 9   2 Term 10/09/10 39w0d  3.572 kg (7 lb 14 oz) M  EPI  EDU      Birth Comments: CMV+,  son with hearing impairment   1 Term 10/31/08 38w0d  3.289 kg (7 lb 4 oz) M    DEU       Patient Active Problem List   Diagnosis     Allergic rhinitis     CARDIOVASCULAR SCREENING; LDL GOAL LESS THAN 160     Anxiety     Gastroesophageal reflux disease without esophagitis     Stress incontinence in female     Irritable bowel syndrome     Past Surgical History:   Procedure Laterality Date     C IUD,MIRENA  2010     CYSTOSCOPY, SLING TRANSOBTURATOR N/A 2020    Procedure: MIDURETHRAL SLING WITH DIAGNOSTIC CYSTOSCOPY;  Surgeon: Sanjeev Pozo MD;  Location:  OR      Social History     Tobacco Use     Smoking status: Never     Smokeless tobacco: Never   Substance Use Topics     Alcohol use: Not Currently     Alcohol/week: 0.0 standard drinks     Comment: very rare      Problem (# of Occurrences) Relation (Name,Age of Onset)    Parkinsonism (1) Mother    Arthritis (1) Paternal Grandmother    Depression (1) Mother    Diabetes (1) Maternal Grandfather    Hypertension (1) Mother    Neurologic Disorder (2) Brother: mental health, Sister: mental health    Osteoporosis (1) Paternal Grandmother    Psychotic Disorder (1) Mother    Cerebrovascular Disease (2) Father, Paternal Grandmother    Thyroid Disease (2) Mother, Sister    Breast Cancer (2) Maternal Cousin, Maternal Cousin    C.A.D. (2) Father: heart problems, Maternal Grandfather: heart problems    Hyperlipidemia (1) Mother    Skin Cancer (1)  "Sister            Current Outpatient Medications   Medication Sig     famotidine (PEPCID) 10 MG tablet Take 10 mg by mouth as needed     metroNIDAZOLE (METROGEL) 0.75 % vaginal gel Place 1 applicator vaginally daily     MULTIVITAMINS OR TABS ONE DAILY     nystatin-triamcinolone (MYCOLOG II) 728604-7.1 UNIT/GM-% external cream APPLY TO THE AFFECTED AREA(S) BY TOPICAL ROUTE 2 TIMES PER DAY IN THE MORNING AND EVENING     Omega-3 Fatty Acids (FISH OIL PO)      triamcinolone (KENALOG) 0.1 % external cream APPLY TO THE AFFECTED AREA(S) BY TOPICAL ROUTE 2 TIMES PER DAY IN THE MORNING AND EVENING for 3 days maximum at a time     venlafaxine (EFFEXOR) 25 MG tablet Take 1 tablet by mouth daily     No current facility-administered medications for this visit.     Allergies   Allergen Reactions     Cat Hair Extract Itching     Chicken-Derived Products (Egg) Other (See Comments)     Clindamycin      Lac Bovis Other (See Comments)     Pcn [Penicillins]      Wheat Bran Other (See Comments)     Penicillin G Rash       Past medical, surgical, social and family histories were reviewed and updated in EPIC.    ROS:   12 point review of systems negative other than symptoms noted below or in the HPI.      EXAM:  /76   Ht 1.607 m (5' 3.25\")   Wt 70.4 kg (155 lb 3.2 oz)   LMP 02/26/2023 (Exact Date)   Breastfeeding No   BMI 27.28 kg/m     BMI: Body mass index is 27.28 kg/m .    PHYSICAL EXAM:  Constitutional:   Appearance: Well nourished, well developed, alert, in no acute distress  Neck:  Lymph Nodes:  No lymphadenopathy present    Thyroid:  Gland size normal, nontender, no nodules or masses present  on palpation  Chest:  Respiratory Effort:  Breathing unlabored  Cardiovascular:    Heart: Auscultation:  Regular rate, normal rhythm, no murmurs present  Breasts: Inspection of Breasts:  No lymphadenopathy present., Palpation of Breasts and Axillae:  No masses present on palpation, no breast tenderness., Axillary Lymph Nodes:  No " lymphadenopathy present. and No nodularity, asymmetry or nipple discharge bilaterally.  Gastrointestinal:   Abdominal Examination:  Abdomen nontender to palpation, tone normal without rigidity or guarding, no masses present, umbilicus without lesions   Liver and Spleen:  No hepatomegaly present, liver nontender to palpation    Hernias:  No hernias present  Lymphatic: Lymph Nodes:  No other lymphadenopathy present  Skin:  General Inspection:  No rashes present, no lesions present, no areas of  discoloration  Neurologic:    Mental Status:  Oriented X3.  Normal strength and tone, sensory exam                grossly normal, mentation intact and speech normal.    Psychiatric:   Mentation appears normal and affect normal/bright.         Pelvic Exam:  External Genitalia:     Normal appearance for age, no discharge present, no tenderness present, no inflammatory lesions present, color normal  Vagina:     Normal vaginal vault without central or paravaginal defects, no discharge present, no inflammatory lesions present, no masses present  Bladder:     Nontender to palpation  Urethra:   Urethral Body:  Urethra palpation normal, urethra structural support normal   Urethral Meatus:  No erythema or lesions present  Cervix:     Appearance healthy, no lesions present, nontender to palpation, no bleeding present  Uterus:     Uterus: firm, normal sized and nontender, midplane in position.   Adnexa:     No adnexal tenderness present, no adnexal masses present  Perineum:     Perineum within normal limits, no evidence of trauma, no rashes or skin lesions present  Anus:     Anus within normal limits, no hemorrhoids present  Inguinal Lymph Nodes:     No lymphadenopathy present  Pubic Hair:     Normal pubic hair distribution for age  Genitalia and Groin:     No rashes present, no lesions present, no areas of discoloration, no masses present      COUNSELING:   Reviewed preventive health counseling, as reflected in patient instructions        Osteoporosis prevention/bone health    BMI: Body mass index is 27.28 kg/m .      ASSESSMENT:  42 year old female with satisfactory annual exam.    ICD-10-CM    1. Encounter for gynecological examination without abnormal finding  Z01.419       2. Encounter for screening mammogram for malignant neoplasm of breast  Z12.31       3. Family history of thyroid disease  Z83.49 TSH with free T4 reflex      4. Vaginal irritation  N89.8 triamcinolone (KENALOG) 0.1 % external cream          PLAN:  -UTD for cervical cancer screening.    -Breast self awareness discussed. UTD for mammogram.  -Colonoscopy age 44  -Osteoporosis prevention discussed.  -Desires TFT labs due to maternal fhx  -Doing well with as needed triamcinolone use on vulva, refilled one year  -Return one year for next annual exam          Becky Corrigan Masters, DO

## 2023-03-02 ENCOUNTER — OFFICE VISIT (OUTPATIENT)
Dept: OBGYN | Facility: CLINIC | Age: 43
End: 2023-03-02
Payer: COMMERCIAL

## 2023-03-02 VITALS
HEIGHT: 63 IN | WEIGHT: 155.2 LBS | BODY MASS INDEX: 27.5 KG/M2 | SYSTOLIC BLOOD PRESSURE: 100 MMHG | DIASTOLIC BLOOD PRESSURE: 76 MMHG

## 2023-03-02 DIAGNOSIS — Z83.49 FAMILY HISTORY OF THYROID DISEASE: ICD-10-CM

## 2023-03-02 DIAGNOSIS — N89.8 VAGINAL IRRITATION: ICD-10-CM

## 2023-03-02 DIAGNOSIS — Z01.419 ENCOUNTER FOR GYNECOLOGICAL EXAMINATION WITHOUT ABNORMAL FINDING: Primary | ICD-10-CM

## 2023-03-02 LAB — TSH SERPL DL<=0.005 MIU/L-ACNC: 1.04 UIU/ML (ref 0.3–4.2)

## 2023-03-02 PROCEDURE — 36415 COLL VENOUS BLD VENIPUNCTURE: CPT | Performed by: OBSTETRICS & GYNECOLOGY

## 2023-03-02 PROCEDURE — 99396 PREV VISIT EST AGE 40-64: CPT | Performed by: OBSTETRICS & GYNECOLOGY

## 2023-03-02 PROCEDURE — 84443 ASSAY THYROID STIM HORMONE: CPT | Performed by: OBSTETRICS & GYNECOLOGY

## 2023-03-02 RX ORDER — TRIAMCINOLONE ACETONIDE 1 MG/G
CREAM TOPICAL
Qty: 15 G | Refills: 1 | Status: SHIPPED | OUTPATIENT
Start: 2023-03-02

## 2023-03-02 RX ORDER — METRONIDAZOLE 500 MG/1
1 TABLET ORAL 2 TIMES DAILY
COMMUNITY
End: 2023-03-02

## 2023-03-02 RX ORDER — METRONIDAZOLE 7.5 MG/G
1 GEL VAGINAL DAILY
COMMUNITY
End: 2024-03-19

## 2023-03-02 RX ORDER — SERTRALINE HYDROCHLORIDE 25 MG/1
1 TABLET, FILM COATED ORAL DAILY
COMMUNITY
End: 2023-03-02

## 2023-03-02 RX ORDER — VENLAFAXINE 25 MG/1
1 TABLET ORAL DAILY
COMMUNITY
Start: 2022-12-28 | End: 2024-04-11

## 2023-03-02 RX ORDER — NYSTATIN AND TRIAMCINOLONE ACETONIDE 100000; 1 [USP'U]/G; MG/G
CREAM TOPICAL
COMMUNITY

## 2023-03-02 RX ORDER — METHYLPHENIDATE HYDROCHLORIDE 10 MG/1
TABLET ORAL
COMMUNITY
Start: 2022-10-25 | End: 2023-03-02

## 2023-03-02 RX ORDER — TRIAMCINOLONE ACETONIDE 1 MG/G
CREAM TOPICAL
COMMUNITY
End: 2023-03-02

## 2023-03-02 ASSESSMENT — ANXIETY QUESTIONNAIRES
GAD7 TOTAL SCORE: 2
2. NOT BEING ABLE TO STOP OR CONTROL WORRYING: NOT AT ALL
6. BECOMING EASILY ANNOYED OR IRRITABLE: NOT AT ALL
IF YOU CHECKED OFF ANY PROBLEMS ON THIS QUESTIONNAIRE, HOW DIFFICULT HAVE THESE PROBLEMS MADE IT FOR YOU TO DO YOUR WORK, TAKE CARE OF THINGS AT HOME, OR GET ALONG WITH OTHER PEOPLE: NOT DIFFICULT AT ALL
3. WORRYING TOO MUCH ABOUT DIFFERENT THINGS: NOT AT ALL
GAD7 TOTAL SCORE: 2
5. BEING SO RESTLESS THAT IT IS HARD TO SIT STILL: SEVERAL DAYS
7. FEELING AFRAID AS IF SOMETHING AWFUL MIGHT HAPPEN: SEVERAL DAYS
1. FEELING NERVOUS, ANXIOUS, OR ON EDGE: NOT AT ALL

## 2023-03-02 ASSESSMENT — PATIENT HEALTH QUESTIONNAIRE - PHQ9
SUM OF ALL RESPONSES TO PHQ QUESTIONS 1-9: 1
5. POOR APPETITE OR OVEREATING: NOT AT ALL

## 2023-03-02 NOTE — PATIENT INSTRUCTIONS
-Daily total calcium intake (between food/supplements) should be 1000mg which equates to 3-4 servings calcium containing food per day; VItamin D 1000IU.   Foods rich in calcium are: milk, cheese, yogurt, seafood, sardines and canned salmon, leafy green vegetables such as diane greens, spinach and kale, beans and lentils, almonds, seeds (poppy, sesame, celery, concetta), rhubarb, dried fruit such as figs, whey protein, tofu and edamame, amaranth, other foods with added calcium such as orange juice and some cereals.   If adequate amount not taken in diet, then a supplement may be needed.     -I also recommend increasing your dietary fiber by starting Metamucil (powder mixed in glass of water) once to twice daily

## 2023-03-10 ENCOUNTER — MYC MEDICAL ADVICE (OUTPATIENT)
Dept: OBGYN | Facility: CLINIC | Age: 43
End: 2023-03-10
Payer: COMMERCIAL

## 2023-03-10 ENCOUNTER — PATIENT OUTREACH (OUTPATIENT)
Dept: ONCOLOGY | Facility: CLINIC | Age: 43
End: 2023-03-10
Payer: COMMERCIAL

## 2023-03-10 DIAGNOSIS — Z80.3 FAMILY HISTORY OF BREAST CANCER: Primary | ICD-10-CM

## 2023-03-10 NOTE — TELEPHONE ENCOUNTER
"Patient sent another mychart a few minutes later: \"I meant BARD1, my older sister had an inconclusive, genetic test of BARD1. I d like to get some genetic testing done.  And all three of my sisters have dense breast tissue as I do.\"    .Ok to send genetic counseling referral as pended?    Candace Waters RN on 3/10/2023 at 9:32 AM    "

## 2023-03-11 NOTE — PROGRESS NOTES
Writer received referral, reviewed for appropriate plan, and sent to New Patient Scheduling for completion.  Flores Leon, RN, BSN  Oncology New Patient Nurse Navigator   [facility]  [phone]

## 2023-03-15 ENCOUNTER — OFFICE VISIT (OUTPATIENT)
Dept: OBGYN | Facility: CLINIC | Age: 43
End: 2023-03-15
Payer: COMMERCIAL

## 2023-03-15 VITALS — BODY MASS INDEX: 27.13 KG/M2 | DIASTOLIC BLOOD PRESSURE: 72 MMHG | WEIGHT: 154.4 LBS | SYSTOLIC BLOOD PRESSURE: 120 MMHG

## 2023-03-15 DIAGNOSIS — R30.0 DYSURIA: Primary | ICD-10-CM

## 2023-03-15 DIAGNOSIS — N90.89 VULVAR IRRITATION: ICD-10-CM

## 2023-03-15 LAB
ALBUMIN UR-MCNC: NEGATIVE MG/DL
APPEARANCE UR: CLEAR
BILIRUB UR QL STRIP: NEGATIVE
COLOR UR AUTO: YELLOW
GLUCOSE UR STRIP-MCNC: NEGATIVE MG/DL
HGB UR QL STRIP: NEGATIVE
KETONES UR STRIP-MCNC: NEGATIVE MG/DL
LEUKOCYTE ESTERASE UR QL STRIP: ABNORMAL
NITRATE UR QL: NEGATIVE
NUGENT SCORE: 0
PH UR STRIP: 6 [PH] (ref 5–7)
RBC #/AREA URNS AUTO: NORMAL /HPF
SP GR UR STRIP: <=1.005 (ref 1–1.03)
UROBILINOGEN UR STRIP-ACNC: 0.2 E.U./DL
WBC #/AREA URNS AUTO: NORMAL /HPF
WHITE BLOOD CELLS: NORMAL

## 2023-03-15 PROCEDURE — 87102 FUNGUS ISOLATION CULTURE: CPT | Performed by: OBSTETRICS & GYNECOLOGY

## 2023-03-15 PROCEDURE — 81001 URINALYSIS AUTO W/SCOPE: CPT | Performed by: OBSTETRICS & GYNECOLOGY

## 2023-03-15 PROCEDURE — 87205 SMEAR GRAM STAIN: CPT | Performed by: OBSTETRICS & GYNECOLOGY

## 2023-03-15 PROCEDURE — 99213 OFFICE O/P EST LOW 20 MIN: CPT | Performed by: OBSTETRICS & GYNECOLOGY

## 2023-03-15 RX ORDER — VENLAFAXINE HYDROCHLORIDE 37.5 MG/1
CAPSULE, EXTENDED RELEASE ORAL
COMMUNITY
Start: 2022-12-12 | End: 2023-03-15

## 2023-03-15 NOTE — PROGRESS NOTES
SUBJECTIVE:                                                   Janay Tam is a 42 year old female who presents to clinic today for the following health issue(s):  Patient presents with:  UTI      Additional information: having pelvic pain. Feels like she has a sting at end of urethria. Noticing it feel the same when she doesn't change clothes right away from gym      HPI:  Patient is here to evaluate for possible UTI or vaginal infection which she has had sx of for about 3 days.  Feels like has tingling at the urethra at the end of the urine stream but not necessarily  Dysuria.  Has frequent external irritation/itching and has an Rx for triamcinolone for that that she uses prn and helps with that.  Has had BV and yeast in the past as well.  Doesn't feel like typical yeast in terms of thick and clumpy white discharge. Doesn't notice any odor.  Had her period  and there are regular/monthly but does now have mid cycle spotting for a couple of days the last several months and did have more discharge last week c/s what she assumes is ovulation. Now has a little pinching in lower quadrant at time of presumed ovulation which she never had before.    Not noticing a patter to these sx today or in the past that is related to her period cycle  Notices that if she works out and doesn't shower and change her clothes right away then she will start to have similar issues and then it can get worse and turn in to full blown infection so when started to feel sx decided to get checked before it got much worse    No other new meds, soaps, detergents.    Patient's last menstrual period was 2023 (exact date)..     Patient is sexually active, .  Using VASECTOMY for contraception.    reports that she has never smoked. She has never used smokeless tobacco.    STD testing offered?  Declined    Health maintenance updated:  yes    Today's PHQ-2 Score:   PHQ-2 (  [employer]) 2023   Q1: Little interest or pleasure in  doing things 0   Q2: Feeling down, depressed or hopeless 0   PHQ-2 Score 0   PHQ-2 Total Score (12-17 Years)- Positive if 3 or more points; Administer PHQ-A if positive -   Q1: Little interest or pleasure in doing things Not at all   Q2: Feeling down, depressed or hopeless Not at all   PHQ-2 Score 0   Some encounter information is confidential and restricted. Go to Review Flowsheets activity to see all data.     Today's PHQ-9 Score:   PHQ-9 SCORE 3/2/2023   PHQ-9 Total Score -   PHQ-9 Total Score MyChart -   PHQ-9 Total Score 1   Some encounter information is confidential and restricted. Go to Review Flowsheets activity to see all data.     Today's MILA-7 Score:   MILA-7 SCORE 3/2/2023   Total Score -   Total Score 2   Some encounter information is confidential and restricted. Go to Review Flowsheets activity to see all data.       Problem list and histories reviewed & adjusted, as indicated.  Additional history: as documented.    Patient Active Problem List   Diagnosis     Allergic rhinitis     CARDIOVASCULAR SCREENING; LDL GOAL LESS THAN 160     Anxiety     Gastroesophageal reflux disease without esophagitis     Stress incontinence in female     Irritable bowel syndrome     Past Surgical History:   Procedure Laterality Date     C IUD,MIRENA  12/2010     CYSTOSCOPY, SLING TRANSOBTURATOR N/A 8/5/2020    Procedure: MIDURETHRAL SLING WITH DIAGNOSTIC CYSTOSCOPY;  Surgeon: Sanjeev Pozo MD;  Location:  OR      Social History     Tobacco Use     Smoking status: Never     Smokeless tobacco: Never   Substance Use Topics     Alcohol use: Not Currently     Alcohol/week: 0.0 standard drinks     Comment: very rare      Problem (# of Occurrences) Relation (Name,Age of Onset)    Parkinsonism (1) Mother    Arthritis (1) Paternal Grandmother    Depression (1) Mother    Diabetes (1) Maternal Grandfather    Hypertension (1) Mother    Neurologic Disorder (2) Brother: mental health, Sister: mental health    Osteoporosis (1)  Paternal Grandmother    Psychotic Disorder (1) Mother    Cerebrovascular Disease (2) Father, Paternal Grandmother    Thyroid Disease (2) Mother, Sister    Breast Cancer (2) Maternal Cousin, Maternal Cousin    C.A.D. (2) Father: heart problems, Maternal Grandfather: heart problems    Hyperlipidemia (1) Mother    Skin Cancer (1) Sister            Current Outpatient Medications   Medication Sig     famotidine (PEPCID) 10 MG tablet Take 10 mg by mouth as needed     metroNIDAZOLE (METROGEL) 0.75 % vaginal gel Place 1 applicator vaginally daily     MULTIVITAMINS OR TABS ONE DAILY     nystatin-triamcinolone (MYCOLOG II) 101232-6.1 UNIT/GM-% external cream APPLY TO THE AFFECTED AREA(S) BY TOPICAL ROUTE 2 TIMES PER DAY IN THE MORNING AND EVENING     Omega-3 Fatty Acids (FISH OIL PO)      triamcinolone (KENALOG) 0.1 % external cream APPLY TO THE AFFECTED AREA(S) BY TOPICAL ROUTE 2 TIMES PER DAY IN THE MORNING AND EVENING for 3 days maximum at a time     venlafaxine (EFFEXOR) 25 MG tablet Take 1 tablet by mouth daily     No current facility-administered medications for this visit.     Allergies   Allergen Reactions     Cat Hair Extract Itching     Chicken-Derived Products (Egg) Other (See Comments)     Clindamycin      Lac Bovis Other (See Comments)     Pcn [Penicillins]      Wheat Bran Other (See Comments)     Penicillin G Rash       ROS:  12 point review of systems negative other than symptoms noted below or in the HPI.  No urinary frequency or dysuria, bladder or kidney problems      OBJECTIVE:     /72   Wt 70 kg (154 lb 6.4 oz)   LMP 02/26/2023 (Exact Date)   BMI 27.13 kg/m    Body mass index is 27.13 kg/m .    Exam:  Constitutional:  Appearance: Well nourished, well developed alert, in no acute distress  Gastrointestinal:  Abdominal Examination:  Abdomen nontender to palpation, tone normal without rigidity or guarding, no masses present, umbilicus without lesions; Liver/Spleen:  No hepatomegaly present, liver  nontender to palpation; Hernias:  No hernias present  Pelvic Exam:  External Genitalia:     Normal appearance for age, no discharge present, no tenderness present, no inflammatory lesions present, color IS SOMEWHAT PALE AND ALMOST ATROPHIC VULVAR APPEARANCE THOUGH VAGINAL TISSUE IS NOT ATROPHIC OR PALE, AREA AT THE POST FOURCHETTE THAT IS VERY SLIGHTLY MORE RED BUT APPEARS MORE C/W OBSTETRIC LACERATION SCAR THAN INFECTION OR LESION PER SE  Vagina:     Normal vaginal vault without central or paravaginal defects, HEAVY MUCOUS AND WHITE CLUMPY DISCHARGE NOTED ON SPECULUM EXAM, PRIOR TO SPEC EXAM VERY FAINT COUPLE SPECKS OF A THICK CLUMPY WHITE THAT WOULD HAVE SEEMED MORE LIKE YEAST BUT DIFFERENT ONCE SPECULUM PLACED, NO ERYTHEMA C/W YEAST, no inflammatory lesions present, no masses present  Bladder:     Nontender to palpation  Urethra:   Urethral Body:  Urethra palpation normal, urethra structural support normal   Urethral Meatus:  No erythema or lesions present  Cervix:     Appearance healthy, no lesions present, nontender to palpation, no bleeding present  Uterus:     Uterus: firm, normal sized and nontender, midplane in position.   Adnexa:     No adnexal tenderness present, no adnexal masses present  Perineum:     Perineum within normal limits, no evidence of trauma, no rashes or skin lesions present  Anus:     Anus within normal limits, no hemorrhoids present  Inguinal Lymph Nodes:     No lymphadenopathy present  Pubic Hair:     Normal pubic hair distribution for age  Genitalia and Groin:     No rashes present, no lesions present, no areas of discoloration, no masses present       In-Clinic Test Results:  Results for orders placed or performed in visit on 03/15/23 (from the past 24 hour(s))   UA Macro with Reflex to Micro and Culture - lab collect    Specimen: Urine, Midstream   Result Value Ref Range    Color Urine Yellow Colorless, Straw, Light Yellow, Yellow    Appearance Urine Clear Clear    Glucose Urine  Negative Negative mg/dL    Bilirubin Urine Negative Negative    Ketones Urine Negative Negative mg/dL    Specific Gravity Urine <=1.005 1.003 - 1.035    Blood Urine Negative Negative    pH Urine 6.0 5.0 - 7.0    Protein Albumin Urine Negative Negative mg/dL    Urobilinogen Urine 0.2 0.2, 1.0 E.U./dL    Nitrite Urine Negative Negative    Leukocyte Esterase Urine Trace (A) Negative   UA Microscopic with Reflex to Culture   Result Value Ref Range    RBC Urine None Seen 0-2 /HPF /HPF    WBC Urine None Seen 0-5 /HPF /HPF    Narrative    Urine Culture not indicated       ASSESSMENT/PLAN:                                                        ICD-10-CM    1. Dysuria  R30.0 UA Macro with Reflex to Micro and Culture - lab collect     UA Macro with Reflex to Micro and Culture - lab collect     UA Microscopic with Reflex to Culture      2. Vulvar irritation  N90.89 Bacterial Vaginosis Smear     Fungal or Yeast Culture Routine          UA is completely negative w/o any concerns for a UTI and patient's sx don't sound c/w that either.    Vaginal SWAB for BV and a yeast culture were both obtained and will f/up with her as soon as results are available.  Possible that does have very early yeast infection but no erythema and the vaginal discharge was much more clear liquid mucous than thick and white.    More than anything her vulvar tissue looks atrophic and like lower E2, however is not menopausal and still regular menses.  Could consider topical estrace to see if that helps, however for now, encouraged her to use vaseline. Can apply as many times of day as needed and then will be a good skin barrier to help with irritation from sweat, friction, discharge, urine and also is moisturizing at the same time.    On the same DOS, 22 minutes, were spent on direct management of the patient's medical issues as above as well as chart review including: imaging, lab work, previous visit notes by this provider,  and other provider notes, and  chart completion       Minerva Muller MD  [facility]

## 2023-03-17 NOTE — RESULT ENCOUNTER NOTE
aJnay,    So far the general swab that tests for yeast and bacterial vaginosis is coming back totally negative. A formal yeast culture is underway as it is more sensitive than just that BV swab. It does take a full five days officially before it's done, but after one day of incubation it is not showing yeast so far.    The only thing that I really noticed the day of your appointment is that that skin of the vulva was somewhat pale, almost like we typically see in postmenopausal women with atrophy (thinning of the tissues). Given you're not menopausal that would be fairly unusual but it did make me wonder if vaginal/vulvar estrogen cream could be helpful for your symptoms.    I think for now let's just have you do the vaseline as we discussed and we'll wait for the yeast culture, and if it's negative but you do still have some symptoms, we could always try vaginal estrogen cream for 4-6 weeks as it's only 2-3 x/week topically, and see if that makes any difference in your symptoms.    Minerva Muller MD

## 2023-03-19 LAB — BACTERIA SPEC CULT: NO GROWTH

## 2023-03-19 NOTE — RESULT ENCOUNTER NOTE
Janay,    The yeast culture is now fully completed and isn't showing any signs of yeast at all. So I think vaseline as we discussed and you could certainly let us know if you want to try vaginal estrogen cream to see if that is part of your symptoms in anyway.    Minerva Muller MD

## 2023-04-02 ENCOUNTER — MYC MEDICAL ADVICE (OUTPATIENT)
Dept: OBGYN | Facility: CLINIC | Age: 43
End: 2023-04-02
Payer: COMMERCIAL

## 2023-04-02 DIAGNOSIS — R10.9 ABDOMINAL PAIN: Primary | ICD-10-CM

## 2023-04-03 ENCOUNTER — TELEPHONE (OUTPATIENT)
Dept: OBGYN | Facility: CLINIC | Age: 43
End: 2023-04-03
Payer: COMMERCIAL

## 2023-04-03 DIAGNOSIS — R10.9 ABDOMINAL PAIN: Primary | ICD-10-CM

## 2023-04-03 NOTE — TELEPHONE ENCOUNTER
Reason for call: Pt called today to schedule an U/S & F/U with Dr. Lewis. When I went into the orders it says that pt was seen today at 11:00 at [address] [facility]. Pt said she did not have that done today.   Patient called regarding (reason for call): appointment  Additional comments: Pt has new Insurance so I updated it in Epic. I told this pt that my Nurses need to look at this and we will get back to her to schedule    Phone number to reach patient:  Cell number on file:    Telephone Information:   Mobile [phone]       Best Time:  Tomorrow please    Can we leave a detailed message on this number?  YES    Travel screening: Not Applicable

## 2023-04-03 NOTE — TELEPHONE ENCOUNTER
Ongoing pain with ovulation, ride side ovary also causing discomfort  US order placed  Pt will call to schedule rich  Audrey Logan RN on 4/3/2023 at 10:58 AM

## 2023-04-10 ENCOUNTER — ANCILLARY PROCEDURE (OUTPATIENT)
Dept: ULTRASOUND IMAGING | Facility: CLINIC | Age: 43
End: 2023-04-10
Payer: COMMERCIAL

## 2023-04-10 DIAGNOSIS — R10.9 ABDOMINAL PAIN: ICD-10-CM

## 2023-04-10 PROCEDURE — 76830 TRANSVAGINAL US NON-OB: CPT

## 2023-04-10 NOTE — PROGRESS NOTES
Janay Tam is a 42 year old female who is being evaluated via a billable telephone visit.      What phone number would you like to be contacted at? [phone]   How would you like to obtain your AVS? Yingharrasheeda      Originating Location (pt. Location): Home      Distant Location (provider location):  On-site      SUBJECTIVE:                                                   Janay Tam is a 42 year old female who presents for virtual visit today for the following health issue(s):  Patient presents with:  Ultrasound  Follow Up        HPI:  Saw AJ 3/15/23 for bacterial infection. Those sx went away.  Typically will have some spotting a when you would have felt the thing during you would know what is going on during course the difference between the 2 is anesthesia so you know at the hospital you get more anesthesia and cost because of the anesthesia because of the hospital facilities hospital 1 is markedly more expensive but again some people you know do not think they could handle being awake or knowing what is going on so it is really up to you what you think okay in the office here good yet yet I think it be fine to nd cramping with ovulation. Then this past mo Started having some stronger pain on right side. Spotting lasted for a week, noted when wiped with urination. Did have some lower back pain too.   Overall seems in the last year has had stronger symptoms.   Periods lasting longer, may be heavier.     Has genetic counseling scheduled for     Patient's last menstrual period was 2023 (exact date).     Patient is sexually active, .  Using vasectomy for contraception.    reports that she has never smoked. She has never used smokeless tobacco.      Health maintenance updated:  yes    Today's PHQ-2 Score:       2023    10:28 AM   PHQ-2 (  [employer])   Q1: Little interest or pleasure in doing things 0   Q2: Feeling down, depressed or hopeless 0   PHQ-2 Score 0   Q1: Little interest or pleasure  in doing things Not at all   Q2: Feeling down, depressed or hopeless Not at all   PHQ-2 Score 0     Today's PHQ-9 Score:       3/2/2023    10:14 AM   PHQ-9 SCORE   PHQ-9 Total Score 1     Today's MILA-7 Score:       3/2/2023    10:14 AM   MILA-7 SCORE   Total Score 2       Problem list and histories reviewed & adjusted, as indicated.  Additional history: as documented.    Patient Active Problem List   Diagnosis     Allergic rhinitis     CARDIOVASCULAR SCREENING; LDL GOAL LESS THAN 160     Anxiety     Gastroesophageal reflux disease without esophagitis     Stress incontinence in female     Irritable bowel syndrome     Past Surgical History:   Procedure Laterality Date     C IUD,MIRENA  12/2010     CYSTOSCOPY, SLING TRANSOBTURATOR N/A 8/5/2020    Procedure: MIDURETHRAL SLING WITH DIAGNOSTIC CYSTOSCOPY;  Surgeon: Sanjeev Pozo MD;  Location:  OR      Social History     Tobacco Use     Smoking status: Never     Smokeless tobacco: Never   Vaping Use     Vaping status: Not on file   Substance Use Topics     Alcohol use: Not Currently     Alcohol/week: 0.0 standard drinks of alcohol     Comment: very rare      Problem (# of Occurrences) Relation (Name,Age of Onset)    Parkinsonism (1) Mother    Arthritis (1) Paternal Grandmother    Depression (1) Mother    Diabetes (1) Maternal Grandfather    Hypertension (1) Mother    Neurologic Disorder (2) Sister: mental health, Brother: mental health    Osteoporosis (1) Paternal Grandmother    Psychotic Disorder (1) Mother    Cerebrovascular Disease (2) Father, Paternal Grandmother    Thyroid Disease (2) Mother, Sister    Breast Cancer (2) Maternal Cousin, Maternal Cousin    C.A.D. (2) Father: heart problems, Maternal Grandfather: heart problems    Genetic Disease (1) Sister    Hyperlipidemia (1) Mother    Skin Cancer (1) Sister            Current Outpatient Medications   Medication Sig     ALPRAZolam (XANAX) 1 MG tablet Take 1 tablet (1 mg) by mouth once for 1 dose Bring  medication with you to clinic     famotidine (PEPCID) 10 MG tablet Take 10 mg by mouth as needed     HYDROcodone-acetaminophen (NORCO) 5-325 MG tablet Take 1 tablet by mouth once for 1 dose Bring medication with you to clinic     metroNIDAZOLE (METROGEL) 0.75 % vaginal gel Place 1 applicator vaginally daily     MULTIVITAMINS OR TABS ONE DAILY     nystatin-triamcinolone (MYCOLOG II) 830034-7.1 UNIT/GM-% external cream APPLY TO THE AFFECTED AREA(S) BY TOPICAL ROUTE 2 TIMES PER DAY IN THE MORNING AND EVENING     Omega-3 Fatty Acids (FISH OIL PO)      triamcinolone (KENALOG) 0.1 % external cream APPLY TO THE AFFECTED AREA(S) BY TOPICAL ROUTE 2 TIMES PER DAY IN THE MORNING AND EVENING for 3 days maximum at a time     venlafaxine (EFFEXOR) 25 MG tablet Take 1 tablet by mouth daily     No current facility-administered medications for this visit.     Allergies   Allergen Reactions     Cat Hair Extract Itching     Chicken-Derived Products (Egg) Other (See Comments)     Clindamycin      Lac Bovis Other (See Comments)     Pcn [Penicillins]      Wheat Bran Other (See Comments)     Penicillin G Rash         OBJECTIVE:     No vitals were obtained today due to virtual visit.    Results for orders placed or performed in visit on 04/10/23   US Transvaginal Pelvic Non-OB    Narrative    EXAM: US TRANSVAGINAL PELVIC NON-OB  LOCATION: [facility]  DATE/TIME: 4/10/2023 10:39 AM    INDICATION: Abdominal pain.  COMPARISON: None.  TECHNIQUE: Endovaginal ultrasound was performed to better visualize the adnexa.    FINDINGS:    UTERUS: 8.7 x 4.3 x 4.7 cm. Normal in size and position with no masses.    ENDOMETRIUM: 12 mm. There is a focal heterogeneous hypoechoic region at the mid to distal endometrium that measures 0.9 x 0.6 x 0.9 cm. Color imaging shows some internal minimal perfusion.    RIGHT OVARY: 2.3 x 1.6 x 2.0 cm. Normal.    LEFT OVARY: 3.0 x 2.0 x 2.1 cm. Normal.    No significant free fluid.      Impression     IMPRESSION:  1.  Potential endometrial polyp measuring up to 0.9 cm. This is a nonspecific finding and further assessment is suggested.  2.  No acute abnormality is seen.           ASSESSMENT/PLAN:                                                      Phone call duration: 16:26 minutes, 11min spent on DOS chart review and documentation and coordination of surgery scheduling      ICD-10-CM    1. Endometrial polyp  N84.0 HYDROcodone-acetaminophen (NORCO) 5-325 MG tablet     ALPRAZolam (XANAX) 1 MG tablet            -Reviewed ultrasound findings with Janay. Discussed what polyps are, most often are benign. Discussed I do not expect that it is impacting her bleeding all that much, likely her changes are more perimenopausal related.   She is interested in removing the polyp. Discussed hysteroscopy, polypectomy and sampling of the lining of the uterus, the procedure and aftercare expectations. Discussed hospital vs clinic based process, and patient experience. Would like to move forward with office hysteroscopy/polypectomy.   Rx for meds vicodin and xanax. Will have her bring to clinic with her. Start with one vicodin and one xanax. Will have the second vicodin if needed. Will also give IM toradol and use injectable prilocaine.   -Schedule office hysteroscopy/polypectomy, endometrial sampling    Becky Raya DO  [facility] [place]      Please schedule IN OFFICE PROCEDURE for:    Patient Name:  Janay Tam ([id number]).  :  1980      Requested Dates:  A tuesday  Schedule based on:  As above  Amount of time needed for the procedure:  1hr   Amount of time prior to procedure patient to arrive: 30-45min  Expected time off from work:  1d  Surgeon:  Becky Raya DO  Procedure permit to read:  Hysteroscopy, polypectomy, endometrial sampling  Location for surgery to performed:   In office/procedure room  Anesthesia:  Local prilocaine      DIAGNOSIS:  Endometrial polyp, Abnormal  uterine bleeding    Special instructions:  Bring vicodin and xanax to clinic for administration here; myosure device  Vendor Rep:  no  Meds Needed: TORADOL: 60mg IM 30-45 minutes before procedure, xanax 1mg and vicodin 1 tab same timing as toradol  RXs explained to patient: Yes - .  RXs sent to pharmacy: Yes - .     No postop needed.     Becky Corrigan Masters, DO

## 2023-04-12 ENCOUNTER — VIRTUAL VISIT (OUTPATIENT)
Dept: OBGYN | Facility: CLINIC | Age: 43
End: 2023-04-12
Payer: COMMERCIAL

## 2023-04-12 DIAGNOSIS — N84.0 ENDOMETRIAL POLYP: Primary | ICD-10-CM

## 2023-04-12 PROCEDURE — 99213 OFFICE O/P EST LOW 20 MIN: CPT | Mod: TEL | Performed by: OBSTETRICS & GYNECOLOGY

## 2023-04-12 RX ORDER — ALPRAZOLAM 1 MG
1 TABLET ORAL ONCE
Qty: 1 TABLET | Refills: 0 | Status: SHIPPED | OUTPATIENT
Start: 2023-04-12 | End: 2023-04-12

## 2023-04-12 RX ORDER — HYDROCODONE BITARTRATE AND ACETAMINOPHEN 5; 325 MG/1; MG/1
1 TABLET ORAL ONCE
Qty: 1 TABLET | Refills: 0 | Status: SHIPPED | OUTPATIENT
Start: 2023-04-12 | End: 2023-04-12

## 2023-04-13 ENCOUNTER — TELEPHONE (OUTPATIENT)
Dept: OBGYN | Facility: CLINIC | Age: 43
End: 2023-04-13

## 2023-04-13 NOTE — TELEPHONE ENCOUNTER
Please schedule IN OFFICE PROCEDURE for:     Patient Name:  Janay Tam ([id number]).  :  1980        Requested Dates:  A tuesday  Schedule based on:  As above  Amount of time needed for the procedure:  1hr     Amount of time prior to procedure patient to arrive: 30-45min  Expected time off from work:  1d  Surgeon:  Becky Raya DO  Procedure permit to read:  Hysteroscopy, polypectomy, endometrial sampling  Location for surgery to performed:   In office/procedure room  Anesthesia:  Local prilocaine        DIAGNOSIS:  Endometrial polyp, Abnormal uterine bleeding     Special instructions:  Bring vicodin and xanax to clinic for administration here; myosure device  Vendor Rep:  no  Meds Needed: TORADOL: 60mg IM 30-45 minutes before procedure, xanax 1mg and vicodin 1 tab same timing as toradol  RXs explained to patient: Yes - .  RXs sent to pharmacy: Yes - .      No postop needed.      Becky Raya DO

## 2023-05-02 ENCOUNTER — ALLIED HEALTH/NURSE VISIT (OUTPATIENT)
Dept: NURSING | Facility: CLINIC | Age: 43
End: 2023-05-02
Payer: COMMERCIAL

## 2023-05-02 ENCOUNTER — OFFICE VISIT (OUTPATIENT)
Dept: OBGYN | Facility: CLINIC | Age: 43
End: 2023-05-02
Payer: COMMERCIAL

## 2023-05-02 DIAGNOSIS — Z01.812 BLOOD TESTS PRIOR TO TREATMENT OR PROCEDURE: Primary | ICD-10-CM

## 2023-05-02 DIAGNOSIS — Z01.812 PRE-PROCEDURE LAB EXAM: ICD-10-CM

## 2023-05-02 DIAGNOSIS — N84.0 ENDOMETRIAL POLYP: Primary | ICD-10-CM

## 2023-05-02 DIAGNOSIS — Z01.812 PRE-PROCEDURE LAB EXAM: Primary | ICD-10-CM

## 2023-05-02 DIAGNOSIS — Z01.89 NORMAL HYSTEROSCOPY: ICD-10-CM

## 2023-05-02 LAB
HCG UR QL: NEGATIVE
INTERNAL QC OK POCT: NORMAL
POCT KIT EXPIRATION DATE: NORMAL
POCT KIT LOT NUMBER: NORMAL

## 2023-05-02 PROCEDURE — 81025 URINE PREGNANCY TEST: CPT | Performed by: OBSTETRICS & GYNECOLOGY

## 2023-05-02 PROCEDURE — 58558 HYSTEROSCOPY BIOPSY: CPT | Performed by: OBSTETRICS & GYNECOLOGY

## 2023-05-02 PROCEDURE — 96372 THER/PROPH/DIAG INJ SC/IM: CPT | Performed by: OBSTETRICS & GYNECOLOGY

## 2023-05-02 PROCEDURE — 99207 PR NO CHARGE NURSE ONLY: CPT

## 2023-05-02 PROCEDURE — 88305 TISSUE EXAM BY PATHOLOGIST: CPT | Performed by: STUDENT IN AN ORGANIZED HEALTH CARE EDUCATION/TRAINING PROGRAM

## 2023-05-02 RX ORDER — KETOROLAC TROMETHAMINE 30 MG/ML
60 INJECTION, SOLUTION INTRAMUSCULAR; INTRAVENOUS ONCE
Status: COMPLETED | OUTPATIENT
Start: 2023-05-02 | End: 2023-05-02

## 2023-05-02 RX ADMIN — KETOROLAC TROMETHAMINE 60 MG: 30 INJECTION, SOLUTION INTRAMUSCULAR; INTRAVENOUS at 13:05

## 2023-05-02 NOTE — PROGRESS NOTES
HYSTEROSCOPY MYOSURE POLYPECTOMY OPERATIVE NOTE    Preoperative Diagnosis: Endometrial polyp  Postoperative Diagnosis: same  Procedure(s): Hysteroscopy myosure polypectomy  Surgeon: DONNA Raya DO  Type of anesthesia:  Local, oral (xanax/vicodin)  Complications: None  EBL:  minimal cc  Fluid deficit: 300 cc  Findings: uterus with multiple small endometrial polyps present, question small anterior submucosal fibroid  Specimen(s) removed: endometrium    Procedure:   Informed consent obtained. The patient was taken to the procedure room. She was draped in the normal sterile fashion in the dorsal lithotomy position. A bivalve speculum was inserted in the vagina. A single tooth tenaculum was used to grasp the anterior lip of the cervix. The nate dilators were used to dilate the cervix. The hysteroscope was inserted and the uterine cavity explored. The above findings were noted. The Myosure device then inserted and polypectomy performed.  Specimen sent to pathology. All instruments were then removed. Tenaculum sites appeared to be hemostatic with pressure and silver nitrate. The patient tolerated the procedure well and will be discharged with a .    Becky Raya DO  May 2, 2023

## 2023-05-02 NOTE — PROGRESS NOTES
Pre-procedure Medications: Xanax, Vicodin and toradol  Pre-Procedure BP: see chart  Temp: see chart    Serum Pregnancy Test: Urine pregnancy test negative    Allergies: ASSESSMENT:      Allergies   Allergen Reactions     Cat Hair Extract Itching     Chicken-Derived Products (Egg) Other (See Comments)     Clindamycin      Lac Bovis Other (See Comments)     Pcn [Penicillins]      Wheat Bran Other (See Comments)     Penicillin G Rash       IF ALLERGIC TO ASA OR NSAIDS DO NOT GIVE TORADOL  Toradol injection: 60 mg IM  Time given: 1301  Site: Ventrogluteal - Left  See MAR    RN:  Ericka COX RN   Surgeon: Dr. Peñas  Ultrasound Tech:  Shae Logan RN on 5/2/2023 at 1:12 PM

## 2023-05-04 LAB
PATH REPORT.COMMENTS IMP SPEC: NORMAL
PATH REPORT.COMMENTS IMP SPEC: NORMAL
PATH REPORT.FINAL DX SPEC: NORMAL
PATH REPORT.GROSS SPEC: NORMAL
PATH REPORT.MICROSCOPIC SPEC OTHER STN: NORMAL
PATH REPORT.RELEVANT HX SPEC: NORMAL
PHOTO IMAGE: NORMAL

## 2023-07-20 ENCOUNTER — VIRTUAL VISIT (OUTPATIENT)
Dept: ONCOLOGY | Facility: CLINIC | Age: 43
End: 2023-07-20
Attending: OBSTETRICS & GYNECOLOGY
Payer: COMMERCIAL

## 2023-07-20 DIAGNOSIS — Z80.3 FAMILY HISTORY OF MALIGNANT NEOPLASM OF BREAST: Primary | ICD-10-CM

## 2023-07-20 DIAGNOSIS — Z80.49 FAMILY HISTORY OF MALIGNANT NEOPLASM OF UTERUS: ICD-10-CM

## 2023-07-20 DIAGNOSIS — Z80.42 FAMILY HISTORY OF MALIGNANT NEOPLASM OF PROSTATE: ICD-10-CM

## 2023-07-20 DIAGNOSIS — Z84.81 FAMILY HISTORY OF CARRIER OF GENETIC DISEASE: ICD-10-CM

## 2023-07-20 PROCEDURE — 96040 HC GENETIC COUNSELING, EACH 30 MINUTES: CPT | Mod: GT,95 | Performed by: GENETIC COUNSELOR, MS

## 2023-07-20 NOTE — LETTER
July 20, 2023    Janay Tam  [address] [address]      Dear Janay,    It was a pleasure speaking with you over video on 7/20/2023. Here is a copy of the progress note from our discussion. If you have any additional questions, please feel free to call.    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 Today we reviewed this history, cancer screening recommendations, and available genetic testing options.    Personal History:  Janay is a 42 year old year old female. She does not have any personal history of cancer. She had her first menstrual period at age 12, her first child at age 28, and is perimenopausal. Janay has her ovaries, fallopian tubes and uterus in place, and she has had no ovarian cancer screening to date. She reports that she has not used hormone replacement therapy. She has regular clinical breast exams and mammograms; she reports that her most recent mammogram in September 2022 was normal. Janay has not had a colonoscopy. She does not regularly do any other cancer screening at this time.     Family History: (Please see scanned pedigree for detailed family history information)  Janay's sister was diagnosed with basal cell carcinoma (BCC) at age 44.  A maternal uncle was diagnosed with prostate cancer at age 65.  A maternal uncle was diagnosed with eye cancer at age 65.  His daughter, Janay's cousin, was diagnosed with breast cancer at age 46. She is reported to have had negative genetic testing but the specifics of this testing were not available for today's visit.  A maternal cousin was diagnosed with bilateral breast cancer at age 40. 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 She also had a variant of uncertain significance in PDGFRA: c.3179T>A, and was negative for 45 genes: APC, CLIFF, AXIN2, BARD1, BMPR1A, BRCA1, BRCA2, BRIP1, CDH1, CDK4, CDKN2A, CTNNA1, DICER1, EPCAM, GREM1, HOXB13, KIT, MEN1, MLH1, MSH2, MSH3, MSH6, MUTYH, NBN, NF1, NTHL1, PALB2, PMS2, POLD1, POLE, PTEN, RAD50, RAD51C, RAD51D, SDHA, SDHB, SDHC, SDHD, SMAD4, SMARCA4, STK11, TP53, TSC1, TSC2, and VHL.  Janay's maternal grandmother was diagnosed with either uterine or ovarian cancer at age 84 and  at age 84.  Janay's maternal grandfather was diagnosed with skin cancer at age 80.  There is no reported family history of cancer on her paternal side of the family.  Her maternal ethnicity is [place] and Frisian. Her paternal ethnicity is [place]. There is no known Ashkenazi Muslim ancestry on either side of her family. There is no reported consanguinity.    Discussion:  Janay's family history of breast, prostate, and gynecological cancer is suggestive of a hereditary cancer syndrome.  We reviewed the features of sporadic, familial, and hereditary cancers. We discussed that mutations in the  CHEK2 gene are known to cause an increased risk for breast and colon cancer. We also discussed other potential cancers might include melanoma, prostate cancer, and thyroid cancer, however, further data is needed.  We discussed that based on our current understanding of this gene, along with the other family history, this would not explain all of the family history in the family.   We discussed the natural history and genetics of hereditary cancer. A detailed handout regarding hereditary cancer, along with the other information we discussed, will be mailed to Janay at the end of our appointment today and can be found in the after visit summary. Topics included: inheritance pattern, cancer risks, cancer screening recommendations, and also risks, benefits and limitations of testing.  Based on her personal and  family history, Janay meets current National Comprehensive Cancer Network (NCCN) criteria for genetic testing of CHEK2.  We also discussed that if her maternal grandmother was diagnosed with ovarian cancer, Janay would also meet NCCN criteria for BRCA1/2 along with other high-penetrance ovarian cancer susceptibility genes (including BRIP1, MLH1, MSH2, MSH6, PMS2, EPCAM, PALB2, RAD51C, and RAD51D).  We discussed that there are additional genes that could cause increased risk for breast, prostate, and/or gynecological cancer. As many of these genes present with overlapping features in a family and accurate cancer risk cannot always be established based upon the pedigree analysis alone, it would be reasonable for Janay to consider panel genetic testing to analyze multiple genes at once.  Genetic testing is available for CHEK2 as part of the patient's core panel. This will then be automatically reflexed to a Custom Cancer panel through [employer].  Genetic testing is available for 48 genes associated with hereditary cancer: Custom Cancers panel (APC, CLIFF, AXIN2, BAP1, BARD1, BMPR1A, BRCA1, BRCA2, BRIP1, CDH1, CDK4, CDKN2A, CHEK2, CTNNA1, DICER1, EPCAM, GREM1, HOXB13, KIT, MEN1, MLH1, MSH2, MSH3, MSH6, MUTYH, NBN, NF1, NTHL1, PALB2, PDGFRA, PMS2, POLD1, POLE, PTEN, RAD50, RAD51C, RAD51D, SDHA, SDHB, SDHC, SDHD, SMAD4, SMARCA4, STK11, TP53, TSC1, TSC2, and VHL).  We discussed that many of the genes in the Common Hereditary Cancers panel are associated with specific hereditary cancer syndromes and published management guidelines: Hereditary Breast and Ovarian Cancer syndrome (BRCA1, BRCA2), Perdomo syndrome (MLH1, MSH2, MSH6, PMS2, EPCAM), Familial Adenomatous Polyposis (APC), Hereditary Diffuse Gastric Cancer (CDH1), Familial Atypical Multiple Mole Melanoma syndrome (CDK4, CDKN2A), Juvenile Polyposis syndrome (BMPR1A, SMAD4), Cowden syndrome (PTEN), Li Fraumeni syndrome (TP53), Peutz-Jeghers syndrome (STK11), MUTYH Associated  Polyposis (MUTYH), Tuberous Sclerosis complex (TSC1, TSC2), Neurofibromatosis type 1 (NF1), Multiple Endocrine Neoplasia type 1 (MEN1), Hereditary Paraganglioma and Pheochromocytoma (SDHA, SDHB, SDHC, SDHD), and von Hippel-Lindau (VHL).   The CLIFF, AXIN2, BRIP1, CHEK2, GREM1, MSH3, NBN, NTHL1, PALB2, POLD1, POLE, RAD51C, and RAD51D genes are associated with increased cancer risk and have published management guidelines for certain cancers.      The remaining genes (BAP1, BARD1, CTNNA1, DICER1, HOXB13, KIT, PDGFRA, RAD50, and SMARCA4) are associated with increased cancer risk and may allow us to make medical recommendations when mutations are identified.    Janay would like to submit a blood sample for her genetic testing. She will go to her [facility] at her earliest convenience to get her blood drawn for her genetic testing.   Verbal consent was given over video and written on the consent form. Turnaround time is approximately 4 weeks once the lab receives the sample.  Medical Management: For Janay, we reviewed that the information from genetic testing may determine:  additional cancer screening for which Janay may qualify (i.e. mammogram and breast MRI, more frequent colonoscopies, more frequent dermatologic exams, etc.),  options for risk reducing surgeries Janay could consider (i.e. bilateral mastectomy, surgery to remove her ovaries and/or uterus, etc.),    and targeted chemotherapies if she were to develop certain cancers in the future (i.e. immunotherapy for individuals with Perdomo syndrome, PARP inhibitors, etc.).   These recommendations and possible targeted chemotherapies will be discussed in detail once genetic testing is completed.     Plan:  1) Today Janay elected to proceed with CHEK2 with automatic reflex to a Custom Cancers panel through [employer].  2) A copy of the consent form and the after visit summary will be mailed to Janay.  3) This information should be available in approximately  4 weeks, once the lab receives the sample.  4) I will call Janay with the results once they become available.    Time spent on video: 49 minutes    Ranjit Cardenas MS, [facility]  Licensed, Certified Genetic Counselor

## 2023-07-20 NOTE — Clinical Note
"    7/20/2023         RE: Janay Tam  [address]  [facility] [address]        Dear Colleague,    Thank you for referring your patient, Janay Tam, to the [facility]. Please see a copy of my visit note below.    Virtual Visit Details    Type of service:  Video Visit     Originating Location (pt. Location): {video visit patient location:092117::\"Home\"}  {PROVIDER LOCATION On-site should be selected for visits conducted from your clinic location or adjoining [facility] hospital, academic office, or other nearby [facility] building. Off-site should be selected for all other provider locations, including home:[id number]}  Distant Location (provider location):  {virtual location provider:[id number]}  Platform used for Video Visit: {Virtual Visit Platforms:559869::\"Rapt\"}    7/20/2023    Referring Provider: ***    Presenting Information:   I met with Janay for her video genetic counseling visit, through the Cancer Risk Management Program, to discuss her personal *** and family history of *** cancer. Today we reviewed this history, cancer screening recommendations, and available genetic testing options.    Personal History:  Jnaay is a 42 year old year old female. She was diagnosed with ***; treatment included ***  / does not have any personal history of cancer.    ***She had her first menstrual period at age ***, her first child at age ***, and is ***.  ***Janay has her ovaries, fallopian tubes and uterus in place, and she has had *** ovarian cancer screening to date. She reports that she *** hormone replacement therapy.      She has *** clinical breast exams and mammograms; her most recent mammogram in *** was ***. ***Janay began having colonoscopies at the age of ***/Janay has not had a colonoscopy. Her most recent colonoscopy in *** was *** and follow-up was recommended in ***. ***She does not regularly do any other cancer screening at this time. Janay reported *** tobacco use and *** alcohol " use.    Family History: (Please see scanned pedigree for detailed family history information)    ***    ***    Her maternal ethnicity is ***. Her paternal ethnicity is ***. There is no known Ashkenazi [place] ancestry on either side of her family. There is no reported consanguinity.    Discussion:    Janay's personal and family history of *** is suggestive of a hereditary cancer syndrome.    We reviewed the features of sporadic, familial, and hereditary cancers. ***    We discussed the natural history and genetics of hereditary cancer. A detailed handout regarding hereditary cancer, along with the other information we discussed, will be mailed to Janay at the end of our appointment today and can be found in the after visit summary. Topics included: inheritance pattern, cancer risks, cancer screening recommendations, and also risks, benefits and limitations of testing.    Based on her personal and family history, Janay meets current National Comprehensive Cancer Network (NCCN) criteria for genetic testing of ***.    We discussed that there are additional genes that could cause increased risk for *** cancer. As many of these genes present with overlapping features in a family and accurate cancer risk cannot always be established based upon the pedigree analysis alone, it would be reasonable for Janay to consider panel genetic testing to analyze multiple genes at once.    ***    Janay stated that she would prefer to submit a saliva kit for her genetic testing. ***[facility] will send a kit directly to her home with directions on how to collect a saliva sample. We discussed that there is a small chance for sample failure due to contamination of the sample. To help minimize this, she should follow the directions that are sent with the kit. Janay verbalized understanding of this. Once the sample is collected, she will send it to ***[facility] using the return envelope and prepaid shipping label.     Verbal consent was given over  video*** and written on the consent form. Turnaround time is approximately 4 weeks once the lab receives the sample.    Medical Management: For Janay, we reviewed that the information from genetic testing may determine:    ***surgery to treat Janay's active cancer diagnosis (i.e. lumpectomy versus bilateral mastectomy, partial versus total colectomy, etc.***),    additional cancer screening for which Janay may qualify (i.e. mammogram and breast MRI, more frequent colonoscopies, more frequent dermatologic exams, etc.***),    options for risk reducing surgeries Janay could consider (i.e. bilateral mastectomy, surgery to remove her ovaries and/or uterus, etc.***),      and targeted chemotherapies for Janay's *** active cancer, or ***if she were to develop certain cancers in the future (i.e. immunotherapy for individuals with Perdomo syndrome, PARP inhibitors, etc.***).     These recommendations and possible targeted chemotherapies will be discussed in detail once genetic testing is completed.     Plan:  1) Today Janay elected to proceed with ***.  2) A copy of the consent form and the after visit summary will be mailed to Janay.  3) This information should be available in approximately 4 weeks, once the lab receives the sample.  4) I will call Janay with the results once they become available.    Time spent on video: *** minutes    Ranjit Cardenas MS, [facility] [place]  Licensed, Certified Genetic Counselor    ***    Virtual Visit Details    Type of service:  Video Visit     Originating Location (pt. Location): Home  Distant Location (provider location):  Off-site  Platform used for Video Visit: AmWell      Again, thank you for allowing me to participate in the care of your patient.        Sincerely,        Ranjit Cardenas,

## 2023-07-20 NOTE — PROGRESS NOTES
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 Today we reviewed this history, cancer screening recommendations, and available genetic testing options.    Personal History:  Janay is a 42 year old year old female. She does not have any personal history of cancer. She had her first menstrual period at age 12, her first child at age 28, and is perimenopausal. Janay has her ovaries, fallopian tubes and uterus in place, and she has had no ovarian cancer screening to date. She reports that she has not used hormone replacement therapy. She has regular clinical breast exams and mammograms; she reports that her most recent mammogram in September 2022 was normal. Janay has not had a colonoscopy. She does not regularly do any other cancer screening at this time.     Family History: (Please see scanned pedigree for detailed family history information)  Janay's sister was diagnosed with basal cell carcinoma (BCC) at age 44.  A maternal uncle was diagnosed with prostate cancer at age 65.  A maternal uncle was diagnosed with eye cancer at age 65.  His daughter, Janay's cousin, was diagnosed with breast cancer at age 46. She is reported to have had negative genetic testing but the specifics of this testing were not available for today's visit.  A maternal cousin was diagnosed with bilateral breast cancer at age 40. 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 She also had a variant of uncertain significance in PDGFRA: c.3179T>A, and was negative for 45 genes: APC, CLIFF, AXIN2, BARD1, BMPR1A, BRCA1, BRCA2, BRIP1, CDH1, CDK4, CDKN2A, CTNNA1, DICER1, EPCAM, GREM1, HOXB13, KIT, MEN1, MLH1, MSH2,  MSH3, MSH6, MUTYH, NBN, NF1, NTHL1, PALB2, PMS2, POLD1, POLE, PTEN, RAD50, RAD51C, RAD51D, SDHA, SDHB, SDHC, SDHD, SMAD4, SMARCA4, STK11, TP53, TSC1, TSC2, and VHL.  Janay's maternal grandmother was diagnosed with either uterine or ovarian cancer at age 84 and  at age 84.  Janay's maternal grandfather was diagnosed with skin cancer at age 80.  There is no reported family history of cancer on her paternal side of the family.  Her maternal ethnicity is [place] and [place]. Her paternal ethnicity is [place]. There is no known Ashkenazi Latter-day ancestry on either side of her family. There is no reported consanguinity.    Discussion:  Janay's family history of breast, prostate, and gynecological cancer is suggestive of a hereditary cancer syndrome.  We reviewed the features of sporadic, familial, and hereditary cancers. We discussed that mutations in the  CHEK2 gene are known to cause an increased risk for breast and colon cancer. We also discussed other potential cancers might include melanoma, prostate cancer, and thyroid cancer, however, further data is needed.  We discussed that based on our current understanding of this gene, along with the other family history, this would not explain all of the family history in the family.   We discussed the natural history and genetics of hereditary cancer. A detailed handout regarding hereditary cancer, along with the other information we discussed, will be mailed to Janay at the end of our appointment today and can be found in the after visit summary. Topics included: inheritance pattern, cancer risks, cancer screening recommendations, and also risks, benefits and limitations of testing.  Based on her personal and family history, Janay meets current National Comprehensive Cancer Network (NCCN) criteria for genetic testing of CHEK2.  We also discussed that if her maternal grandmother was diagnosed with ovarian cancer, Jnaay would also meet NCCN criteria for BRCA1/2 along with other  high-penetrance ovarian cancer susceptibility genes (including BRIP1, MLH1, MSH2, MSH6, PMS2, EPCAM, PALB2, RAD51C, and RAD51D).  We discussed that there are additional genes that could cause increased risk for breast, prostate, and/or gynecological cancer. As many of these genes present with overlapping features in a family and accurate cancer risk cannot always be established based upon the pedigree analysis alone, it would be reasonable for Janay to consider panel genetic testing to analyze multiple genes at once.  Genetic testing is available for CHEK2 as part of the patient's core panel. This will then be automatically reflexed to a Custom Cancer panel through [facility].  Genetic testing is available for 48 genes associated with hereditary cancer: Custom Cancers panel (APC, CLIFF, AXIN2, BAP1, BARD1, BMPR1A, BRCA1, BRCA2, BRIP1, CDH1, CDK4, CDKN2A, CHEK2, CTNNA1, DICER1, EPCAM, GREM1, HOXB13, KIT, MEN1, MLH1, MSH2, MSH3, MSH6, MUTYH, NBN, NF1, NTHL1, PALB2, PDGFRA, PMS2, POLD1, POLE, PTEN, RAD50, RAD51C, RAD51D, SDHA, SDHB, SDHC, SDHD, SMAD4, SMARCA4, STK11, TP53, TSC1, TSC2, and VHL).  We discussed that many of the genes in the Common Hereditary Cancers panel are associated with specific hereditary cancer syndromes and published management guidelines: Hereditary Breast and Ovarian Cancer syndrome (BRCA1, BRCA2), Perdomo syndrome (MLH1, MSH2, MSH6, PMS2, EPCAM), Familial Adenomatous Polyposis (APC), Hereditary Diffuse Gastric Cancer (CDH1), Familial Atypical Multiple Mole Melanoma syndrome (CDK4, CDKN2A), Juvenile Polyposis syndrome (BMPR1A, SMAD4), Cowden syndrome (PTEN), Li Fraumeni syndrome (TP53), Peutz-Jeghers syndrome (STK11), MUTYH Associated Polyposis (MUTYH), Tuberous Sclerosis complex (TSC1, TSC2), Neurofibromatosis type 1 (NF1), Multiple Endocrine Neoplasia type 1 (MEN1), Hereditary Paraganglioma and Pheochromocytoma (SDHA, SDHB, SDHC, SDHD), and von Hippel-Lindau (VHL).   The CLIFF, AXIN2, BRIP1, CHEK2,  GREM1, MSH3, NBN, NTHL1, PALB2, POLD1, POLE, RAD51C, and RAD51D genes are associated with increased cancer risk and have published management guidelines for certain cancers.    The remaining genes (BAP1, BARD1, CTNNA1, DICER1, HOXB13, KIT, PDGFRA, RAD50, and SMARCA4) are associated with increased cancer risk and may allow us to make medical recommendations when mutations are identified.    Janay would like to submit a blood sample for her genetic testing. She will go to her [facility] at her earliest convenience to get her blood drawn for her genetic testing.   Verbal consent was given over video and written on the consent form. Turnaround time is approximately 4 weeks once the lab receives the sample.  Medical Management: For Janay, we reviewed that the information from genetic testing may determine:  additional cancer screening for which Janay may qualify (i.e. mammogram and breast MRI, more frequent colonoscopies, more frequent dermatologic exams, etc.),  options for risk reducing surgeries Janay could consider (i.e. bilateral mastectomy, surgery to remove her ovaries and/or uterus, etc.),    and targeted chemotherapies if she were to develop certain cancers in the future (i.e. immunotherapy for individuals with Perdomo syndrome, PARP inhibitors, etc.).   These recommendations and possible targeted chemotherapies will be discussed in detail once genetic testing is completed.     Plan:  1) Today Janay elected to proceed with CHEK2 with automatic reflex to a Custom Cancers panel through [facility].  2) A copy of the consent form and the after visit summary will be mailed to Janay.  3) This information should be available in approximately 4 weeks, once the lab receives the sample.  4) I will call Janay with the results once they become available.    Time spent on video: 49 minutes    Ranjit Cardenas MS, [facility] [place]  Licensed, Certified Genetic Counselor      Virtual Visit Details    Type of service:  Video Visit      Originating Location (pt. Location): Home  Distant Location (provider location):  Off-site  Platform used for Video Visit: Aleksandra

## 2023-07-20 NOTE — NURSING NOTE
Is the patient currently in the state of [place]? YES    Visit mode:VIDEO    If the visit is dropped, the patient can be reconnected by: VIDEO VISIT: Text to cell phone: [phone]    Will anyone else be joining the visit? NO      How would you like to obtain your AVS? MyChart    Are changes needed to the allergy or medication list? NO    Reason for visit: Consult

## 2023-08-08 NOTE — PATIENT INSTRUCTIONS
Assessing Cancer Risk  Cancer is a common diagnosis which impacts many families.  Individuals may develop cancer due to environmental factors (such as exposures and lifestyle), aging, genetic predisposition, or a combination of these factors.      Only about 5-10% of cancers are thought to be due to an inherited cancer susceptibility gene.    These families often have:  Several people with the same or related types of cancer  Cancers diagnosed at a young age (before age 50)  Individuals with more than one primary cancer  Multiple generations of the family affected with cancer    Comprehensive Breast and Gynecologic Cancer Panel  We each inherit two copies of every gene in our bodies: one from our mother, and one from our father. Each gene has a specific job to do.  When a gene has a mistake or  mutation  in it, it does not work like it should.     Some people may be candidates for genetic testing of more than one gene.  For these families, genetic testing using a cancer panel may be offered. These panels will test different genes at once known to increase the risk for breast, ovarian, uterine, and/or other cancers.    This handout will review common hereditary breast and gynecologic cancer syndromes. The genes that will be discussed in this handout are: CLIFF, BRCA1, BRCA2, BRIP1, CDH1, CHEK2, MLH1, MSH2, MSH6, PMS2, EPCAM, PTEN, PALB2, RAD51C, RAD51D, and TP53.    The purpose of this handout is to serve as a brief summary of the breast and gynecologic cancer risk genes that have published clinical management guidelines for individuals who are found to carry a mutation. Inheriting a mutation does not mean a person will develop cancer, but it does significantly increase their risk above the general population risk.     ______________________________________________________________________________    Hereditary Breast and Ovarian Cancer Syndrome (BRCA1 and BRCA2)  A single mutation in one of the copies of BRCA1 or  BRCA2 increases the risk for breast and ovarian cancer, among others.  The risk for pancreatic cancer and melanoma may also be slightly increased in some families.  The chart below shows the chance that someone with a BRCA mutation would develop cancer in his or her lifetime1,2,3,4.       Lifetime Cancer Risks    General Population BRCA1  BRCA2   Breast  12% >60% >60%   Ovarian  1-2% 39-58% 13-29%   Prostate 12% 7-26% 19-61%   Male Breast 0.1% 0.2-1.2% 1.8-7.1%   Pancreas 1-2% Up to 5% 5-10%     A person s ethnic background is also important to consider, as individuals of Ashkenazi Jew ancestry have a higher chance of having a BRCA gene mutation.  There are three BRCA mutations that occur more frequently in this population.      Perdomo Syndrome (MLH1, MSH2, MSH6, PMS2, and EPCAM)  Currently five genes are known to cause Perdomo Syndrome: MLH1, MSH2, MSH6, PMS2, and EPCAM.  A single mutation in one of the Perdomo Syndrome genes increases the risk for colon, endometrial, ovarian, and stomach cancers.  Other cancers that occur less commonly in Perdomo Syndrome include urinary tract, skin, and brain cancers.  The chart below shows the chance that a person with Perdomo syndrome would develop cancer in his or her lifetime5.      Lifetime Cancer Risks    General Population Perdomo Syndrome   Colon 5% 10-61%   Endometrial 3% 13-57%   Ovarian 1-2% 1-38%   Stomach <1% 1-9%   *Cancer risk varies depending on Perdomo syndrome gene found      Cowden Syndrome (PTEN)  Cowden syndrome is a hereditary condition that increases the risk for breast, thyroid, endometrial, colon, and kidney cancer.  Cowden syndrome is caused by a mutation in the PTEN gene.  A single mutation in one of the copies of PTEN causes Cowden syndrome and increases cancer risk.  The chart below shows the chance that someone with a PTEN mutation would develop cancer in their lifetime6,7.  Other benign features seen in some individuals with Cowden syndrome include benign  skin lesions (facial papules, keratoses, lipomas), learning disability, autism, thyroid nodules, colon polyps, and larger head size.     Lifetime Cancer Risks    General Population Cowden   Breast 12% 40-60%*   Thyroid 1% Up to 38%   Renal 1-2% Up to 35%   Endometrial 3% Up to 28%   Colon 5% Up to 9%   Melanoma 2-3% Up to 6%   *Emerging data suggests the risk for breast cancer could be greater than 60%               Li-Fraumeni Syndrome (TP53)  Li-Fraumeni Syndrome (LFS) is a cancer predisposition syndrome caused by a mutation in the TP53 gene. A single mutation in one of the copies of TP53 increases the risk for multiple cancers. Individuals with LFS are at an increased risk for developing cancer at a young age. The lifetime risk for development of a LFS-associated cancer is 50% by age 30 and 90% by age 60.   Core Cancers: Sarcomas, Breast, Brain, Lung, Leukemias/Lymphomas, Adrenocortical carcinomas  Other Cancers: Gastrointestinal, Thyroid, Skin, Genitourinary       Hereditary Diffuse Gastric Cancer (CDH1)  Currently, one gene is known to cause hereditary diffuse gastric cancer (HDGC): CDH1.  Individuals with HDGC are at increased risk for diffuse gastric cancer and lobular breast cancer. Of people diagnosed with HDGC, 30-50% have a mutation in the CDH1 gene.  This suggests there are likely other genes that may cause HDGC that have not been identified yet.      Lifetime Cancer Risks    General Population HDGC   Diffuse Gastric  <1% ~80%   Breast 12% 41-60%       Additional Genes    CLIFF  CLIFF is a moderate-risk breast cancer gene. Women who have a mutation in CLIFF can have between a 2-4 fold increased risk for breast cancer compared to the general population8. CLIFF mutations have also been associated with increased risk for pancreatic cancer between 5-10%9. Individuals who inherit two CLIFF mutations have a condition called ataxia-telangiectasia (AT).  This rare autosomal recessive condition affects the nervous system  and immune system, and is associated with progressive cerebellar ataxia beginning in childhood. Individuals with ataxia-telangiectasia often have a weakened immune system and have an increased risk for childhood cancers.    PALB2  Mutations in PALB2 have been shown to increase the risk of breast cancer up to 41-60% in some families; where individuals fall within this risk range is dependent upon family [id number]. PALB2 mutations have also been associated with increased risk for pancreatic cancer between 5-10%.  Individuals who inherit two PALB2 mutations--one from their mother and one from their father--have a condition called Fanconi Anemia.  This rare autosomal recessive condition is associated with short stature, developmental delay, bone marrow failure, and increased risk for childhood cancers.    CHEK2   CHEK2 is a moderate-risk breast cancer gene.  Women who have a mutation in CHEK2 have around a 2-4 fold increased risk for breast cancer compared to the general population, and this risk may be higher depending upon family history.11,12,13 The risk of colon cancer may be twice as high as the general population risk of colon cancer of 5%. Mutations in CHEK2 have also been shown to increase the risk of other cancers, including prostate, however these cancer risks are currently not well understood.    BRIP1, RAD51C and RAD51D  Mutations in RAD51C and RAD51D have been shown to increase the risk of ovarian cancer and breast cancer 14,. Mutations in BRIP1 have been shown to increase the risk of ovarian cancer and possibly female breast cancer 15 .       Lifetime Cancer Risk    General Population        BRIP1   RAD51C  RAD51D   Breast 12% Not well defined 20-40% 20-40%   Ovarian 1-2% 5-15% 10-15% 10-20%     ______________________________________________________________  Inheritance  All of the cancer syndromes reviewed above are inherited in an autosomal dominant pattern.  This means that if a parent has a mutation,  each of their children will have a 50% chance of inheriting that same mutation. Therefore, each child --male or female-- would have a 50% chance of being at increased risk for developing cancer.    Image obtained from Genetics Home Reference, 2013     Mutations in some genes can occur de jairo, which means that a person s mutation occurred for the first time in them and was not inherited from a parent.  Now that they have the mutation, however, it can be passed on to future generations.    Genetic Testing  Genetic testing involves a blood test and will look for any harmful mutations that are associated with increased cancer risk.  If possible, it is recommended that the person(s) who has had cancer be tested before other family members.  That person will give us the most useful information about whether or not a specific gene is associated with the cancer in the family.    Results  There are three possible results of genetic testing:  Positive--a harmful mutation was identified in one or more of the genes  Negative--no mutations were identified in any of the genes tested  Variant of unknown significance--a variation in one of the genes was identified, but it is unclear how this impacts cancer risk in the family    Advantages and Disadvantages   There are advantages and disadvantages to genetic testing.    Advantages  May clarify your cancer risk  Can help you make medical decisions  May explain the cancers in your family  May give useful information to your family members (if you share your results)    Disadvantages  Possible negative emotional impact of learning about inherited cancer risk  Uncertainty in interpreting a negative test result in some situations  Possible genetic discrimination concerns (see below)    Genetic Information Nondiscrimination Act (YOVANI)  The Genetic Information Nondiscrimination Act of 2008 (YOVANI) is a federal law that protects individuals from health insurance or employment discrimination  based on a genetic test result alone (with some exceptions, including employers with fewer than 15 employees, and ).  Although rare, YOVANI  does not cover discrimination protections in terms of life insurance, long term care, or disability insurances.  Visit the National Human [employer] [place] website to learn more.    Reducing Cancer Risk  All of the genes described in this handout have nationally recognized cancer screening guidelines that would be recommended for individuals who test positive.  In addition to increased cancer screening, surgeries may be offered or recommended to reduce cancer risk.  Recommendations are based upon an individual s genetic test result as well as their personal and family history of cancer.    Questions to Think About Regarding Genetic Testing:  What effect will the test result have on me and my relationship with my family members if I have an inherited gene mutation?  If I don t have a gene mutation?  Should I share my test results, and how will my family react to this news, which may also affect them?  Are my children ready to learn new information that may one day affect their own health?    Hereditary Cancer Resources    FORCE: Facing Our Risk of Cancer Empowered facingourrisk.org   Bright Pink bebrightpink.org   Li-Fraumeni Syndrome Association lfsassociation.org   PTEN World PTENworld.com   No stomach for cancer, Inc. nostomachforcancer.org   Stomach cancer relief network Scrnet.org   Collaborative Group of the Americas on Inherited Colorectal Cancer (CGA) cgaicc.com    Cancer Care cancercare.org   American Cancer Society (ACS) cancer.org   National Cancer [place] (NCI) cancer.gov     Please call us if you have any questions or concerns.   Cancer Risk Management Program [phone]-CANCER ([phone])  Ranjit Cardenas, MS [facility] [place]  [phone]  Michelle Marshall, MS, [facility] [place] [phone]  Eloise Hagen, MS, [facility] [place]  [phone]  Maddy Ariza, MS, [facility] [place]  [phone]  Becky Pace,  MS, [facility] [place]  [phone]  Andreea Hudson, MS, [facility] [place] [phone]  Becky Willett, MS, [facility] [place] [phone]    References  Nick Teonrio PDP, Breonna S, Anne FINLEY, Felisha JE, Betty JL, Mina N, Tessie H, Horacio O, Aric A, Pasini B, Radienedina P, Manhetal S, Ankur DM, Greene N, Johan E, Caridad H, Jackson E, Raciel J, Gronmagno J, Grace B, Tulinius H, Thorlacius S, Eerola H, Nevanlinna H, Cheryl K, Oscar OP. Average risks of breast and ovarian cancer associated with BRCA1 or BRCA2 mutations detected in case series unselected for family history: a combined analysis of 222 studies. Am J Hum Roopa. 2003;72:1117-30.  Kosta N, Jasmyn M, Cinthya G.  BRCA1 and BRCA2 Hereditary Breast and Ovarian Cancer. Gene Reviews online. 2013.  Aric YC, Curtis S, Diana G, Treviño S. Breast cancer risk among male BRCA1 and BRCA2 mutation carriers. J Natl Cancer Inst. 2007;99:1811-4.  Tray JERRY, Tio I, Damien J, Latrice E, Yulissa ER, Je F. Risk of breast cancer in male BRCA2 carriers. J Med Roopa. 2010;47:710-1.  National Comprehensive Cancer Network. Clinical practice guidelines in oncology, colorectal cancer screening. Available online (registration required). 2015.  Addi MH, Coleen J, Christian J, Ceci REED, Shamir MS, Eng C. Lifetime cancer risks in individuals with germline PTEN mutations. Clin Cancer Res. 2012;18:400-7.  Nury R. Cowden Syndrome: A Critical Review of the Clinical Literature. J Roopa . 2009:18:13-27.  Jannie PANDEY, Britton CARTWRIGHT, Manuel S, Perla P, Jesica T, Oscar M, Kash B, Serina H, Armin R, Nasirn K, Corey L, Tray JERRY, Ankur CARTWRIGHT, Evans DF, Tawana MR, The Breast Cancer Susceptibility Collaboration (UK) & Corey MISTRY. CLIFF mutations that cause ataxia-telangiectasia are breast cancer susceptibility alleles. Nature Genetics. 2006;38:873-875  Moustapha N , Madonna Y, Ileana J, Gigi L, Valerie PETERSON , Giovanni ML, Radha S, Rajendra AG, Alvaro S, Thierry ML, Kristie J , Brittney R, Flash DESAI, Kimberly  JR, Rach VE, Danielle M, Voyoditstein B, Christiano N, Marino RH, Debra KW, and Amadeo AP. CLIFF mutations in patients with hereditary pancreatic cancer. Cancer Discover. 2012;2:41-46  Clem SEARS., et al. Breast-Cancer Risk in Families with Mutations in PALB2. NEJM. 2014; 371(6):497-506.  CHEK2 Breast Cancer Case-Control Consortium. CHEK2*1100delC and susceptibility to breast cancer: A collaborative analysis involving 10,860 breast cancer cases and 9,065 controls from 10 studies. Am J Hum Roopa, 74 (2004), pp. 7404-6804  Delfino T, Arnold S, Germania K, et al. Spectrum of Mutations in BRCA1, BRCA2, CHEK2, and TP53 in Families at High Risk of Breast Cancer. TISH. 2006;295(12):1691-5239.   Carole C, Primitivo D, Lisa PANDEY, et al. Risk of breast cancer in women with a CHEK2 mutation with and without a family history of breast cancer. J Clin Oncol. 2011;29:5320-4761.  Song H, Chocos E, Ramus SJ, et al. Contribution of germline mutations in the RAD51B, RAD51C, and RAD51D genes to ovarian cancer in the population. J Clin Oncol. 2015;33(26):7159-4780. Doi:10.1200/JCO.2015.61.2408.  Marie T, Elmo DF, Kun P, et al. Mutations in BRIP1 confer high risk of ovarian cancer. Karissa Roopa. 2011;43(11):3834-7409. doi:10.1038/ng.955.

## 2023-08-14 ENCOUNTER — LAB (OUTPATIENT)
Dept: LAB | Facility: CLINIC | Age: 43
End: 2023-08-14
Payer: COMMERCIAL

## 2023-08-14 DIAGNOSIS — Z80.3 FAMILY HISTORY OF MALIGNANT NEOPLASM OF BREAST: ICD-10-CM

## 2023-08-14 DIAGNOSIS — Z84.81 FAMILY HISTORY OF CARRIER OF GENETIC DISEASE: ICD-10-CM

## 2023-08-14 DIAGNOSIS — Z80.49 FAMILY HISTORY OF MALIGNANT NEOPLASM OF UTERUS: ICD-10-CM

## 2023-08-14 DIAGNOSIS — Z80.42 FAMILY HISTORY OF MALIGNANT NEOPLASM OF PROSTATE: ICD-10-CM

## 2023-08-14 PROCEDURE — 36415 COLL VENOUS BLD VENIPUNCTURE: CPT

## 2023-08-28 LAB — SCANNED LAB RESULT: NORMAL

## 2023-09-07 ENCOUNTER — VIRTUAL VISIT (OUTPATIENT)
Dept: ONCOLOGY | Facility: CLINIC | Age: 43
End: 2023-09-07
Attending: GENETIC COUNSELOR, MS
Payer: COMMERCIAL

## 2023-09-07 DIAGNOSIS — Z80.3 FAMILY HISTORY OF MALIGNANT NEOPLASM OF BREAST: Primary | ICD-10-CM

## 2023-09-07 DIAGNOSIS — Z80.42 FAMILY HISTORY OF MALIGNANT NEOPLASM OF PROSTATE: ICD-10-CM

## 2023-09-07 DIAGNOSIS — Z84.81 FAMILY HISTORY OF CARRIER OF GENETIC DISEASE: ICD-10-CM

## 2023-09-07 DIAGNOSIS — Z80.49 FAMILY HISTORY OF MALIGNANT NEOPLASM OF UTERUS: ICD-10-CM

## 2023-09-07 PROCEDURE — 999N000069 HC STATISTIC GENETIC COUNSELING, < 16 MIN: Mod: GT,95 | Performed by: GENETIC COUNSELOR, MS

## 2023-09-07 NOTE — NURSING NOTE
Is the patient currently in the state of [place]? YES    Visit mode:VIDEO    If the visit is dropped, the patient can be reconnected by: VIDEO VISIT: Text to cell phone:   Telephone Information:   Mobile [phone]       Will anyone else be joining the visit? NO  (If patient encounters technical issues they should call [phone])    How would you like to obtain your AVS? MyChart    Are changes needed to the allergy or medication list? No    Reason for visit: ROBY HICKEY

## 2023-09-07 NOTE — PROGRESS NOTES
"9/7/2023    Referring Provider: Becky Raya DO    Presenting Information:  I spoke to Janay by video today to discuss her genetic testing results. Her blood was drawn on 8/14/23. A Custom Cancer panel was ordered from [facility]. This testing was done because of Janay's family history of breast, prostate, and gynecological cancer.     Genetic Testing Result: Multiple Variants of Uncertain Significance (VUS)  Janay was found to have 2 variants of uncertain significance (VUS).    BARD1: c.57G>C (p.Cxk16Atf)   PDGFRA: c.1450G>A (p.Ffq209Cvg)    No other variants or mutations were found in these genes. Given the uncertain significance of this result, medical management decisions should NOT be made based on this test result alone.    Of note, Janay is negative for mutations in APC, CLIFF, AXIN2, BAP1, BMPR1A, BRCA1, BRCA2, BRIP1, CDH1, CDK4, CDKN2A, CHEK2, CTNNA1, DICER1, EPCAM, GREM1, HOXB13, KIT, MEN1, MLH1, MSH2, MSH3, MSH6, MUTYH, NBN, NF1, NTHL1, PALB2, PMS2, POLD1, POLE, PTEN, RAD50, RAD51C, RAD51D, SDHA, SDHB, SDHC, SDHD, SMAD4, SMARCA4, STK11, TP53, TSC1, TSC2, and VHL. No mutations were found in any of the other 46 genes analyzed. This test involved sequencing and deletion/duplication analysis of all genes with the exceptions of EPCAM and GREM1 (deletions/duplications only) and SDHA (sequencing only). We reviewed the autosomal dominant inheritance of these genes. Janay cannot pass on a mutation in any of these genes to her children based on this test result. Mutations in these genes do not skip generations.      A copy of the test report can be found in the Laboratory tab, dated 8/14/23, and named \"LABORATORY MISCELLANEOUS ORDER\". The report is scanned in as a linked document.    Interpretation:  We discussed several different interpretations of this inconclusive test result. It is not clear if any of these variants are associated with increased cancer risk. These variants may be benign changes that do not increased " cancer risk, or they may be harmful mutations that cause increased risk for certain cancers.    BARD1 c.57G>C (p.Ogn49Uxg)  Harmful mutations in the BARD1 gene are associated with increased risk for breast cancer. We discussed that medical management decisions are NOT recommended for individuals with a VUS result.    PDGFRA c.1450G>A (p.Gky874Rod)  Harmful mutations in the PDGFRA gene are associated with increased risk for gastrointestinal stromal tumors (GISTs), PDGFRA-associated chronic eosinophilic leukemia, and inflammatory fibroid polyps in the gastrointestinal tract. We discussed that medical management decisions are NOT recommended for individuals with a VUS result.    The laboratory is working to determine if any of these variants are harmful or benign, and they will contact me if any of them are reclassified. If these variants are determined to be benign, there may be a different gene or combination of genes and environment that are associated with the cancers in this family.    It is also important to consider that other maternal relatives may have had a mutation in one of the genes tested and she did not inherit it.     Inheritance:  We reviewed the autosomal dominant inheritance of the variants in the BARD1 and PDGFRA genes. We discussed that Janay has a 50% chance to pass each of these variants to each of her children. Likewise, there is a 50% chance for each of these variants to be present in each of her siblings. Because it is unclear what, if any, risk is associated with these variants, clinical genetic testing for these BARD1 and PDGFRA variants alone is not recommended for relatives.    Screening:  Based on this inconclusive test result, it is important for Janay and her relatives to refer back to the family history for appropriate cancer screening.    Population cancer screening options, such as those recommended by the American Cancer Society and the National Comprehensive Cancer Network (NCCN),  are also appropriate for Janay and her family. These screening recommendations may change if there are changes to Janay's personal and/or family history of cancer. Final screening recommendations should be made by each individual's primary care provider.      Additional Testing Considerations:  Although Janay's genetic testing result is inconclusive, other relatives may still carry a harmful gene mutation associated with breast, prostate, and/or gynecological cancer. Genetic counseling is recommended for other close maternal relatives to discuss genetic testing options. If any of these relatives do pursue genetic testing, Janay is encouraged to contact me so that we may review the impact of their test results on her    Summary:  While no genetic changes were identified, Janay may still be at risk for certain cancers due to family history, environmental factors, or other genetic causes not identified by this test.  Because of that, it is important that she continue with cancer screening based on her personal and family history as discussed above.    Genetic testing is rapidly advancing, and new cancer susceptibility genes will most likely be identified in the future.  Therefore, I encouraged Janay to contact me annually or if there are changes in her personal or family history.  This may change how we assess her cancer risk, screening, and the testing we would offer.    Plan:  1.  I provided Janay with a copy of her test results today.    2. She plans to follow-up with her other providers.  3. She should contact me regularly, or sooner if her family history changes.  4. I will contact Janay if the laboratory informs me that these variants have been reclassified.  This may change screening and testing recommendations for Janay and her relatives.    If Janay has any further questions, I encouraged her to contact me via [place].    Time spent on video: 11 minutes    Ranjit Cardenas MS, [facility] [place]  Licensed, Certified Genetic  Counselor      Virtual Visit Details    Type of service:  Video Visit     Originating Location (pt. Location): Home  Distant Location (provider location):  Off-site  Platform used for Video Visit: MobileMD

## 2023-09-07 NOTE — Clinical Note
"    9/7/2023         RE: Janay Tam  [address]  [facility] [address]        Dear Colleague,    Thank you for referring your patient, Janay Tam, to the [facility]. Please see a copy of my visit note below.    9/7/2023    Referring Provider: Becky Raya DO    Presenting Information:  I spoke to Janay by video today to discuss her genetic testing results. Her blood was drawn on 8/14/23. A Custom Cancer panel was ordered from [facility]. This testing was done because of Janay's family history of breast, prostate, and gynecological cancer.     Genetic Testing Result: Multiple Variants of Uncertain Significance (VUS)  Janay was found to have 2 variants of uncertain significance (VUS).    BARD1: c.57G>C (p.Gey45Ood)   PDGFRA: c.1450G>A (p.[id number])    No other variants or mutations were found in these genes. Given the uncertain significance of this result, medical management decisions should NOT be made based on this test result alone.    Of note, Janay is negative for mutations in APC, CLIFF, AXIN2, BAP1, BMPR1A, BRCA1, BRCA2, BRIP1, CDH1, CDK4, CDKN2A, CHEK2, CTNNA1, DICER1, EPCAM, GREM1, HOXB13, KIT, MEN1, MLH1, MSH2, MSH3, MSH6, MUTYH, NBN, NF1, NTHL1, PALB2, PMS2, POLD1, POLE, PTEN, RAD50, RAD51C, RAD51D, SDHA, SDHB, SDHC, SDHD, SMAD4, SMARCA4, STK11, TP53, TSC1, TSC2, and VHL. No mutations were found in any of the other 46 genes analyzed. This test involved sequencing and deletion/duplication analysis of all genes with the exceptions of EPCAM and GREM1 (deletions/duplications only) and SDHA (sequencing only). We reviewed the autosomal dominant inheritance of these genes. Janay cannot pass on a mutation in any of these genes to her children based on this test result. Mutations in these genes do not skip generations.      A copy of the test report can be found in the Laboratory tab, dated 8/14/23, and named \"LABORATORY MISCELLANEOUS ORDER\". The report is scanned in as a linked " document.    Interpretation:  We discussed several different interpretations of this inconclusive test result. It is not clear if any of these variants are associated with increased cancer risk. These variants may be benign changes that do not increased cancer risk, or they may be harmful mutations that cause increased risk for certain cancers.    BARD1 c.57G>C (p.Ueq28Wok)  Harmful mutations in the BARD1 gene are associated with increased risk for breast cancer. We discussed that medical management decisions are NOT recommended for individuals with a VUS result.    PDGFRA c.1450G>A (p.[id number])  Harmful mutations in the PDGFRA gene are associated with increased risk for gastrointestinal stromal tumors (GISTs), PDGFRA-associated chronic eosinophilic leukemia, and inflammatory fibroid polyps in the gastrointestinal tract. We discussed that medical management decisions are NOT recommended for individuals with a VUS result.    The laboratory is working to determine if any of these variants are harmful or benign, and they will contact me if any of them are reclassified. If these variants are determined to be benign, there may be a different gene or combination of genes and environment that are associated with the cancers in this family.    It is also important to consider that other maternal relatives may have had a mutation in one of the genes tested and she did not inherit it.     Inheritance:  We reviewed the autosomal dominant inheritance of the variants in the BARD1 and PDGFRA genes. We discussed that Janay has a 50% chance to pass each of these variants to each of her children. Likewise, there is a 50% chance for each of these variants to be present in each of her siblings. Because it is unclear what, if any, risk is associated with these variants, clinical genetic testing for these BARD1 and PDGFRA variants alone is not recommended for relatives.    Screening:  Based on this inconclusive test result, it is  important for Janay and her relatives to refer back to the family history for appropriate cancer screening.    Population cancer screening options, such as those recommended by the American Cancer Society and the National Comprehensive Cancer Network (NCCN), are also appropriate for Janay and her family. These screening recommendations may change if there are changes to Janay's personal and/or family history of cancer. Final screening recommendations should be made by each individual's primary care provider.      Additional Testing Considerations:  Although Janay's genetic testing result is inconclusive, other relatives may still carry a harmful gene mutation associated with breast, prostate, and/or gynecological cancer. Genetic counseling is recommended for other close maternal relatives to discuss genetic testing options. If any of these relatives do pursue genetic testing, Janay is encouraged to contact me so that we may review the impact of their test results on her    Summary:  While no genetic changes were identified, Janay may still be at risk for certain cancers due to family history, environmental factors, or other genetic causes not identified by this test.  Because of that, it is important that she continue with cancer screening based on her personal and family history as discussed above.    Genetic testing is rapidly advancing, and new cancer susceptibility genes will most likely be identified in the future.  Therefore, I encouraged Janay to contact me annually or if there are changes in her personal or family history.  This may change how we assess her cancer risk, screening, and the testing we would offer.    Plan:  1.  I provided Janay with a copy of her test results today.    2. She plans to follow-up with her other providers.  3. She should contact me regularly, or sooner if her family history changes.  4. I will contact Janay if the laboratory informs me that these variants have been reclassified.  This may  change screening and testing recommendations for Janay and her relatives.    If Janay has any further questions, I encouraged her to contact me via [employer].    Time spent on video: 11 minutes    Ranjit Cardenas MS, [facility] [place]  Licensed, Certified Genetic Counselor      Virtual Visit Details    Type of service:  Video Visit     Originating Location (pt. Location): Home  Distant Location (provider location):  Off-site  Platform used for Video Visit: Well      Again, thank you for allowing me to participate in the care of your patient.        Sincerely,        Ranjit Cardenas, GC

## 2023-09-07 NOTE — LETTER
"September 7, 2023    Janay Tam  [address] [facility] [address]      Dear Janay,    It was a pleasure speaking with you over video on 9/7/2023. Here is a copy of the progress note from our discussion. If you have any additional questions, please feel free to call.    Referring Provider: Bceky Raya DO    Presenting Information:  I spoke to Janay by video today to discuss her genetic testing results. Her blood was drawn on 8/14/23. A Custom Cancer panel was ordered from [facility]. This testing was done because of Janay's family history of breast, prostate, and gynecological cancer.     Genetic Testing Result: Multiple Variants of Uncertain Significance (VUS)  Janay was found to have 2 variants of uncertain significance (VUS).    BARD1: c.57G>C (p.Pkv16Qrj)   PDGFRA: c.1450G>A (p.Gbc001Rhz)    No other variants or mutations were found in these genes. Given the uncertain significance of this result, medical management decisions should NOT be made based on this test result alone.    Of note, Janay is negative for mutations in APC, CLIFF, AXIN2, BAP1, BMPR1A, BRCA1, BRCA2, BRIP1, CDH1, CDK4, CDKN2A, CHEK2, CTNNA1, DICER1, EPCAM, GREM1, HOXB13, KIT, MEN1, MLH1, MSH2, MSH3, MSH6, MUTYH, NBN, NF1, NTHL1, PALB2, PMS2, POLD1, POLE, PTEN, RAD50, RAD51C, RAD51D, SDHA, SDHB, SDHC, SDHD, SMAD4, SMARCA4, STK11, TP53, TSC1, TSC2, and VHL. No mutations were found in any of the other 46 genes analyzed. This test involved sequencing and deletion/duplication analysis of all genes with the exceptions of EPCAM and GREM1 (deletions/duplications only) and SDHA (sequencing only). We reviewed the autosomal dominant inheritance of these genes. Janay cannot pass on a mutation in any of these genes to her children based on this test result. Mutations in these genes do not skip generations.      A copy of the test report can be found in the Laboratory tab, dated 8/14/23, and named \"LABORATORY MISCELLANEOUS ORDER\". The report is scanned in " as a linked document.    Interpretation:  We discussed several different interpretations of this inconclusive test result. It is not clear if any of these variants are associated with increased cancer risk. These variants may be benign changes that do not increased cancer risk, or they may be harmful mutations that cause increased risk for certain cancers.    BARD1 c.57G>C (p.Bko77Lvp)  Harmful mutations in the BARD1 gene are associated with increased risk for breast cancer. We discussed that medical management decisions are NOT recommended for individuals with a VUS result.    PDGFRA c.1450G>A (p.[id number])  Harmful mutations in the PDGFRA gene are associated with increased risk for gastrointestinal stromal tumors (GISTs), PDGFRA-associated chronic eosinophilic leukemia, and inflammatory fibroid polyps in the gastrointestinal tract. We discussed that medical management decisions are NOT recommended for individuals with a VUS result.    The laboratory is working to determine if any of these variants are harmful or benign, and they will contact me if any of them are reclassified. If these variants are determined to be benign, there may be a different gene or combination of genes and environment that are associated with the cancers in this family.    It is also important to consider that other maternal relatives may have had a mutation in one of the genes tested and she did not inherit it.     Inheritance:  We reviewed the autosomal dominant inheritance of the variants in the BARD1 and PDGFRA genes. We discussed that Janay has a 50% chance to pass each of these variants to each of her children. Likewise, there is a 50% chance for each of these variants to be present in each of her siblings. Because it is unclear what, if any, risk is associated with these variants, clinical genetic testing for these BARD1 and PDGFRA variants alone is not recommended for relatives.    Screening:  Based on this inconclusive test result,  it is important for Janay and her relatives to refer back to the family history for appropriate cancer screening.    Population cancer screening options, such as those recommended by the American Cancer Society and the National Comprehensive Cancer Network (NCCN), are also appropriate for Janay and her family. These screening recommendations may change if there are changes to Janay's personal and/or family history of cancer. Final screening recommendations should be made by each individual's primary care provider.      Additional Testing Considerations:  Although Janay's genetic testing result is inconclusive, other relatives may still carry a harmful gene mutation associated with breast, prostate, and/or gynecological cancer. Genetic counseling is recommended for other close maternal relatives to discuss genetic testing options. If any of these relatives do pursue genetic testing, Janay is encouraged to contact me so that we may review the impact of their test results on her    Summary:  While no genetic changes were identified, Janay may still be at risk for certain cancers due to family history, environmental factors, or other genetic causes not identified by this test.  Because of that, it is important that she continue with cancer screening based on her personal and family history as discussed above.    Genetic testing is rapidly advancing, and new cancer susceptibility genes will most likely be identified in the future.  Therefore, I encouraged Janay to contact me annually or if there are changes in her personal or family history.  This may change how we assess her cancer risk, screening, and the testing we would offer.    Plan:  1.  I provided Janay with a copy of her test results today.    2. She plans to follow-up with her other providers.  3. She should contact me regularly, or sooner if her family history changes.  4. I will contact Janay if the laboratory informs me that these variants have been reclassified.   This may change screening and testing recommendations for Janay and her relatives.    If Janay has any further questions, I encouraged her to contact me via [employer].    Time spent on video: 11 minutes    Ranjit Cardenas MS, [facility] [place]  Licensed, Certified Genetic Counselor

## 2023-11-07 ENCOUNTER — OFFICE VISIT (OUTPATIENT)
Dept: FAMILY MEDICINE | Facility: CLINIC | Age: 43
End: 2023-11-07
Payer: COMMERCIAL

## 2023-11-07 DIAGNOSIS — Z12.83 SKIN CANCER SCREENING: Primary | ICD-10-CM

## 2023-11-07 DIAGNOSIS — L57.0 ACTINIC KERATOSIS: ICD-10-CM

## 2023-11-07 DIAGNOSIS — D18.01 CHERRY ANGIOMA: ICD-10-CM

## 2023-11-07 DIAGNOSIS — L82.1 SEBORRHEIC KERATOSIS: ICD-10-CM

## 2023-11-07 DIAGNOSIS — L81.4 SOLAR LENTIGO: ICD-10-CM

## 2023-11-07 DIAGNOSIS — D22.9 MULTIPLE BENIGN NEVI: ICD-10-CM

## 2023-11-07 PROCEDURE — 99213 OFFICE O/P EST LOW 20 MIN: CPT | Mod: 25 | Performed by: PHYSICIAN ASSISTANT

## 2023-11-07 PROCEDURE — 17003 DESTRUCT PREMALG LES 2-14: CPT | Performed by: PHYSICIAN ASSISTANT

## 2023-11-07 PROCEDURE — 17000 DESTRUCT PREMALG LESION: CPT | Performed by: PHYSICIAN ASSISTANT

## 2023-11-07 NOTE — PATIENT INSTRUCTIONS
Patient Education       Proper skin care from [facility]:    -Eliminate harsh soaps as they strip the natural oils from the skin, often resulting in dry itchy skin ( i.e. Dial, Zest, [place])  -Use mild soaps such as Cetaphil or Dove Sensitive Skin in the shower. You do not need to use soap on arms, legs, and trunk every time you shower unless visibly soiled.   -Avoid hot or cold showers.  -After showering, lightly dry off and apply moisturizing within 2-3 minutes. This will help trap moisture in the skin.   -Aggressive use of a moisturizer at least 1-2 times a day to the entire body (including -Vanicream, Cetaphil, Aquaphor or Cerave) and moisturize hands after every washing.  -We recommend using moisturizers that come in a tub that needs to be scooped out, not a pump. This has more of an oil base. It will hold moisture in your skin much better than a water base moisturizer. The above recommended are non-pore clogging.      Wear a sunscreen with at least SPF 30 on your face, ears, neck and V of the chest daily. Wear sunscreen on other areas of the body if those areas are exposed to the sun throughout the day. Sunscreens can contain physical and/or chemical blockers. Physical blockers are less likely to clog pores, these include zinc oxide and titanium dioxide. Reapply every two hour and after swimming.     Sunscreen examples: https://www.ewg.org/sunscreen/    UV radiation  UVA radiation remains constant throughout the day and throughout the year. It is a longer wavelength than UVB and therefore penetrates deeper into the skin leading to immediate and delayed tanning, photoaging, and skin cancer. 70-80% of UVA and UVB radiation occurs between the hours of 10am-2pm.  UVB radiation  UVB radiation causes the most harmful effects and is more significant during the summer months. However, snow and ice can reflect UVB radiation leading to skin damage during the winter months as well. UVB radiation is  responsible for tanning, burning, inflammation, delayed erythema (pinkness), pigmentation (brown spots), and skin cancer.     I recommend self monthly full body exams and yearly full body exams with a dermatology provider. If you develop a new or changing lesion please follow up for examination. Most skin cancers are pink and scaly or pink and pearly. However, we do see blue/brown/black skin cancers.  Consider the ABCDEs of melanoma when giving yourself your monthly full body exam ( don't forget the groin, buttocks, feet, toes, etc). A-asymmetry, B-borders, C-color, D-diameter, E-elevation or evolving. If you see any of these changes please follow up in clinic. If you cannot see your back I recommend purchasing a hand held mirror to use with a larger wall mirror.       Checking for Skin Cancer  You can find cancer early by checking your skin each month. There are 3 kinds of skin cancer. They are melanoma, basal cell carcinoma, and squamous cell carcinoma. Doing monthly skin checks is the best way to find new marks or skin changes. Follow the instructions below for checking your skin.   The ABCDEs of checking moles for melanoma   Check your moles or growths for signs of melanoma using ABCDE:   Asymmetry: the sides of the mole or growth don t match  Border: the edges are ragged, notched, or blurred  Color: the color within the mole or growth varies  Diameter: the mole or growth is larger than 6 mm (size of a pencil eraser)  Evolving: the size, shape, or color of the mole or growth is changing (evolving is not shown in the images below)    Checking for other types of skin cancer  Basal cell carcinoma or squamous cell carcinoma have symptoms such as:     A spot or mole that looks different from all other marks on your skin  Changes in how an area feels, such as itching, tenderness, or pain  Changes in the skin's surface, such as oozing, bleeding, or scaliness  A sore that does not heal  New swelling or redness beyond  the border of a mole    Who s at risk?  Anyone can get skin cancer. But you are at greater risk if you have:   Fair skin, light-colored hair, or light-colored eyes  Many moles or abnormal moles on your skin  A history of sunburns from sunlight or tanning beds  A family history of skin cancer  A history of exposure to radiation or chemicals  A weakened immune system  If you have had skin cancer in the past, you are at risk for recurring skin cancer.   How to check your skin  Do your monthly skin checkups in front of a full-length mirror. Check all parts of your body, including your:   Head (ears, face, neck, and scalp)  Torso (front, back, and sides)  Arms (tops, undersides, upper, and lower armpits)  Hands (palms, backs, and fingers, including under the nails)  Buttocks and genitals  Legs (front, back, and sides)  Feet (tops, soles, toes, including under the nails, and between toes)  If you have a lot of moles, take digital photos of them each month. Make sure to take photos both up close and from a distance. These can help you see if any moles change over time.   Most skin changes are not cancer. But if you see any changes in your skin, call your doctor right away. Only he or she can diagnose a problem. If you have skin cancer, seeing your doctor can be the first step toward getting the treatment that could save your life.   [employer] last reviewed this educational content on 4/1/2019 2000-2020 [employer]. [address] [place] [address], [place] [address]. All rights reserved. This information is not intended as a substitute for professional medical care. Always follow your healthcare professional's instructions.       When should I call my doctor?  If you are worsening or not improving, please, contact us or seek urgent care as noted below.     Who should I call with questions (adults)?  [facility] (adult and pediatric): [phone]  [facility]  [place] (adult): [phone]  [facility] ([place], [place], [place] and [place]) [phone]  For urgent needs outside of business hours call the [facility] at [phone] and ask for the dermatology resident on call to be paged  If this is a medical emergency and you are unable to reach an ER, Call 911      If you need a prescription refill, please contact your pharmacy. Refills are approved or denied by our Physicians during normal business hours, Monday through Fridays  Per office policy, refills will not be granted if you have not been seen within the past year (or sooner depending on your child's condition)         Cryotherapy    What is it?  Use of a very cold liquid, such as liquid nitrogen, to freeze and destroy abnormal skin cells that need to be removed    What should I expect?  Tenderness and redness  A small blister that might grow and fill with dark purple blood. There may be crusting.  More than one treatment may be needed if the lesions do not go away.    How do I care for the treated area?  Gently wash the area with your hands when bathing.  Use a thin layer of Vaseline to help with healing. You may use a Band-Aid.   The area should heal within 7-10 days and may leave behind a pink or lighter color.   Do not use an antibiotic or Neosporin ointment.   You may take acetaminophen (Tylenol) for pain.     Call your doctor if you have:  Severe pain  Signs of infection (warmth, redness, cloudy yellow drainage, and or a bad smell)  Questions or concerns    Who should I call with questions?      [facility]: [phone]      [facility]: [phone]      For urgent needs outside of business hours call the [facility] at [phone] and ask for the dermatology resident on call

## 2023-11-07 NOTE — PROGRESS NOTES
[facility] Dermatology Note  Encounter Date: Nov 7, 2023  Office Visit     Dermatology Problem List:  Last TBSE 11/7/23, recommend yearly    1. Verruca vulgaris  - bilateral legs x 2, s/p LN2 4/2/21  2. ISKs  - s/p cryo 4/2/21  3. AKs  - right upper forehead x 1 and left upper forehead x 1, s/p cryo 11/7/23    FHx: Skin cancer - unknown type (sister).  SHx: Pt has 3 children.    ____________________________________________    Assessment & Plan:    # Skin cancer screening with multiple benign nevi and solar lentigines.  - ABCDEs: Counseled ABCDEs of melanoma: Asymmetry, Border (irregularity), Color (not uniform, changes in color), Diameter (greater than 6 mm which is about the size of a pencil eraser), and Evolving (any changes in preexisting moles).  - Sun protection: Counseled SPF30+ sunscreen, UPF clothing, sun avoidance, tanning bed avoidance.    # Cherry angiomas.  # Seborrheic keratoses.  - Benign, reassurance given.      # Actinic keratosis, right upper forehead x 1 and left upper forehead x 1.  - Cryotherapy performed today (see procedure note(s) below).    Procedures Performed:   - Cryotherapy procedure note, locations: see above. After verbal consent and discussion of risks and benefits including, but not limited to, dyspigmentation/scar, blister, and pain, 2 lesion were treated with 1-2 mm freeze border for 1-2 cycles with liquid nitrogen. Post cryotherapy instructions were provided.    Follow-up: 1 year in-person, or earlier for new or changing lesions    Staff and Scribe:     Scribe Disclosure:   I, Gisella Mortensen, am serving as a scribe to document services personally performed by Christie Hanson PA-C based on data collection and the provider's statements to me.     Provider Disclosure:   The documentation recorded by the scribe accurately reflects the services I personally performed and the decisions made by me.    All risks, benefits and alternatives were discussed with  patient.  Patient is in agreement and understands the assessment and plan.  All questions were answered.  Sun Screen Education was given.   Return to Clinic annually or sooner as needed.   Christie Hanson PA-C   [facility]    ____________________________________________    CC: Skin Check (FBSE)    HPI:  Ms. Janay Tam is a(n) 43 year old female who presents today as a return patient for a skin check.    Last seen by me on 4/12/22 for a TBSE that was unremarkable.    Today, she is concerned about dry patches of her skin that wax and wane, but she is unsure of their origin. She is unsure if they are affected by weather changes. OTC moisturizers do not provide relief. Denies any recent health changes.    For sun protection, she mainly applies sunscreen in the morning, but applies diffusely throughout the summer. However, she does not lie out in the sun often.    Patient is otherwise feeling well, without additional skin concerns.    Labs Reviewed:  N/A    Physical Exam:  Vitals: There were no vitals taken for this visit.  SKIN: Total skin excluding the undergarment areas was performed. The exam included the head/face, neck, both arms, chest, back, abdomen, both legs, digits and/or nails.   - There are erythematous macules with overyling adherent scale on the right upper forehead x 1 and left upper forehead x 1.   - There are dome shaped bright red papules on the trunk and extremities.   - Multiple regular brown pigmented macules and papules are identified on the trunk and extremities.   - Scattered brown macules on sun exposed areas.  - There are waxy stuck on tan to brown papules on the trunk and extremities   - No other lesions of concern on areas examined.     Medications:  Current Outpatient Medications   Medication    famotidine (PEPCID) 10 MG tablet    metroNIDAZOLE (METROGEL) 0.75 % vaginal gel    MULTIVITAMINS OR TABS    nystatin-triamcinolone (MYCOLOG II) 654702-4.1 UNIT/GM-%  external cream    Omega-3 Fatty Acids (FISH OIL PO)    triamcinolone (KENALOG) 0.1 % external cream    venlafaxine (EFFEXOR) 25 MG tablet     No current facility-administered medications for this visit.      Past Medical History:   Patient Active Problem List   Diagnosis    Allergic rhinitis    CARDIOVASCULAR SCREENING; LDL GOAL LESS THAN 160    Anxiety    Gastroesophageal reflux disease without esophagitis    Stress incontinence in female    Irritable bowel syndrome     Past Medical History:   Diagnosis Date    Allergic rhinitis, cause unspecified     Allergic rhinitis    Cytomegaloviral disease (H) 2010    Second child with hearing loss. 6/14 IgM neg, IgG pos    Depressive disorder, not elsewhere classified     use effexor in the past - more seasonal    Generalized anxiety disorder     GERD (gastroesophageal reflux disease)     Papanicolaou smear of cervix with atypical squamous cells of undetermined significance (ASC-US) 2001    Unsure of outcome    Postpartum depression     with first delivery, not currently medicated        CC No referring provider defined for this encounter. on close of this encounter.

## 2023-11-07 NOTE — LETTER
11/7/2023         RE: Janay Tam  [address]  [place] [place] [facility] [address]        Dear Colleague,    Thank you for referring your patient, Janay Tam, to the [facility]. Please see a copy of my visit note below.    [facility] Dermatology Note  Encounter Date: Nov 7, 2023  Office Visit     Dermatology Problem List:  Last TBSE 11/7/23, recommend yearly    1. Verruca vulgaris  - bilateral legs x 2, s/p LN2 4/2/21  2. ISKs  - s/p cryo 4/2/21  3. AKs  - right upper forehead x 1 and left upper forehead x 1, s/p cryo 11/7/23    FHx: Skin cancer - unknown type (sister).  SHx: Pt has 3 children.    ____________________________________________    Assessment & Plan:    # Skin cancer screening with multiple benign nevi and solar lentigines.  - ABCDEs: Counseled ABCDEs of melanoma: Asymmetry, Border (irregularity), Color (not uniform, changes in color), Diameter (greater than 6 mm which is about the size of a pencil eraser), and Evolving (any changes in preexisting moles).  - Sun protection: Counseled SPF30+ sunscreen, UPF clothing, sun avoidance, tanning bed avoidance.    # Cherry angiomas.  # Seborrheic keratoses.  - Benign, reassurance given.      # Actinic keratosis, right upper forehead x 1 and left upper forehead x 1.  - Cryotherapy performed today (see procedure note(s) below).    Procedures Performed:   - Cryotherapy procedure note, locations: see above. After verbal consent and discussion of risks and benefits including, but not limited to, dyspigmentation/scar, blister, and pain, 2 lesion were treated with 1-2 mm freeze border for 1-2 cycles with liquid nitrogen. Post cryotherapy instructions were provided.    Follow-up: 1 year in-person, or earlier for new or changing lesions    Staff and Scribe:     Scribe Disclosure:   SILVER, Gisella Mortensen, am serving as a scribe to document services personally performed by Christie Hanson PA-C based on data collection and  the provider's statements to me.     Provider Disclosure:   The documentation recorded by the scribe accurately reflects the services I personally performed and the decisions made by me.    All risks, benefits and alternatives were discussed with patient.  Patient is in agreement and understands the assessment and plan.  All questions were answered.  Sun Screen Education was given.   Return to Clinic annually or sooner as needed.   Christie Hanson PA-C   [facility]    ____________________________________________    CC: Skin Check (FBSE)    HPI:  Ms. Janay Tam is a(n) 43 year old female who presents today as a return patient for a skin check.    Last seen by me on 4/12/22 for a TBSE that was unremarkable.    Today, she is concerned about dry patches of her skin that wax and wane, but she is unsure of their origin. She is unsure if they are affected by weather changes. OTC moisturizers do not provide relief. Denies any recent health changes.    For sun protection, she mainly applies sunscreen in the morning, but applies diffusely throughout the summer. However, she does not lie out in the sun often.    Patient is otherwise feeling well, without additional skin concerns.    Labs Reviewed:  N/A    Physical Exam:  Vitals: There were no vitals taken for this visit.  SKIN: Total skin excluding the undergarment areas was performed. The exam included the head/face, neck, both arms, chest, back, abdomen, both legs, digits and/or nails.   - There are erythematous macules with overyling adherent scale on the right upper forehead x 1 and left upper forehead x 1.   - There are dome shaped bright red papules on the trunk and extremities.   - Multiple regular brown pigmented macules and papules are identified on the trunk and extremities.   - Scattered brown macules on sun exposed areas.  - There are waxy stuck on tan to brown papules on the trunk and extremities   - No other lesions of concern on  areas examined.     Medications:  Current Outpatient Medications   Medication     famotidine (PEPCID) 10 MG tablet     metroNIDAZOLE (METROGEL) 0.75 % vaginal gel     MULTIVITAMINS OR TABS     nystatin-triamcinolone (MYCOLOG II) 524456-9.1 UNIT/GM-% external cream     Omega-3 Fatty Acids (FISH OIL PO)     triamcinolone (KENALOG) 0.1 % external cream     venlafaxine (EFFEXOR) 25 MG tablet     No current facility-administered medications for this visit.      Past Medical History:   Patient Active Problem List   Diagnosis     Allergic rhinitis     CARDIOVASCULAR SCREENING; LDL GOAL LESS THAN 160     Anxiety     Gastroesophageal reflux disease without esophagitis     Stress incontinence in female     Irritable bowel syndrome     Past Medical History:   Diagnosis Date     Allergic rhinitis, cause unspecified     Allergic rhinitis     Cytomegaloviral disease (H) 2010    Second child with hearing loss. 6/14 IgM neg, IgG pos     Depressive disorder, not elsewhere classified     use effexor in the past - more seasonal     Generalized anxiety disorder      GERD (gastroesophageal reflux disease)      Papanicolaou smear of cervix with atypical squamous cells of undetermined significance (ASC-US) 2001    Unsure of outcome     Postpartum depression     with first delivery, not currently medicated        CC No referring provider defined for this encounter. on close of this encounter.      Again, thank you for allowing me to participate in the care of your patient.        Sincerely,        Christie Hanson PA-C

## 2023-11-30 ENCOUNTER — TRANSFERRED RECORDS (OUTPATIENT)
Dept: HEALTH INFORMATION MANAGEMENT | Facility: CLINIC | Age: 43
End: 2023-11-30
Payer: COMMERCIAL

## 2024-01-22 ENCOUNTER — NURSE TRIAGE (OUTPATIENT)
Dept: NURSING | Facility: CLINIC | Age: 44
End: 2024-01-22
Payer: COMMERCIAL

## 2024-01-22 NOTE — TELEPHONE ENCOUNTER
Pt requesting Rx for Paxlovid.    COVID Positive/Requesting COVID treatment    Patient is positive for COVID and requesting treatment options.    Date of positive COVID test (PCR or at home)? 1/22/24  Current COVID symptoms: cough, fatigue, and congestion or runny nose, body aches  Date COVID symptoms began: 1/19/24    Message should be routed to clinic RN pool. Best phone number to use for call back: [phone]     Thank you  Megan Beltran RN  [facility] Nurse Advisor      Reason for Disposition   COVID-19 diagnosed by positive lab test (e.g., PCR, rapid self-test kit) and mild symptoms (e.g., cough, fever, others) and no complications or SOB    Additional Information   Negative: SEVERE difficulty breathing (e.g., struggling for each breath, speaks in single words)   Negative: Difficult to awaken or acting confused (e.g., disoriented, slurred speech)   Negative: Bluish (or gray) lips or face now   Negative: Shock suspected (e.g., cold/pale/clammy skin, too weak to stand, low BP, rapid pulse)   Negative: Sounds like a life-threatening emergency to the triager   Negative: Diagnosed or suspected COVID-19 and symptoms lasting 3 or more weeks   Negative: COVID-19 exposure and no symptoms   Negative: COVID-19 vaccine reaction suspected (e.g., fever, headache, muscle aches) occurring 1 to 3 days after getting vaccine   Negative: COVID-19 vaccine, questions about   Negative: Lives with someone known to have influenza (flu test positive) and flu-like symptoms (e.g., cough, runny nose, sore throat, SOB; with or without fever)   Negative: Possible COVID-19 symptoms and triager concerned about severity of symptoms or other causes   Negative: COVID-19 and breastfeeding, questions about   Negative: SEVERE or constant chest pain or pressure  (Exception: Mild central chest pain, present only when coughing.)   Negative: MODERATE difficulty breathing (e.g., speaks in phrases, SOB even at rest, pulse 100-120)   Negative: Headache and  stiff neck (can't touch chin to chest)   Negative: Oxygen level (e.g., pulse oximetry) 90% or lower   Negative: Chest pain or pressure  (Exception: MILD central chest pain, present only when coughing.)   Negative: Drinking very little and dehydration suspected (e.g., no urine > 12 hours, very dry mouth, very lightheaded)   Negative: Patient sounds very sick or weak to the triager   Negative: MILD difficulty breathing (e.g., minimal/no SOB at rest, SOB with walking, pulse <100)   Negative: Fever > 103 F (39.4 C)   Negative: Fever > 101 F (38.3 C) and over 60 years of age   Negative: Fever > 100.0 F (37.8 C) and bedridden (e.g., CVA, chronic illness, recovering from surgery)   Negative: HIGH RISK patient (e.g., weak immune system, age > 64 years, obesity with BMI of 30 or higher, pregnant, chronic lung disease or other chronic medical condition) and COVID symptoms (e.g., cough, fever)  (Exceptions: Already seen by doctor or NP/PA and no new or worsening symptoms.)   Negative: HIGH RISK patient and influenza is widespread in the community and ONE OR MORE respiratory symptoms: cough, sore throat, runny or stuffy nose   Negative: HIGH RISK patient and influenza exposure within the last 7 days and ONE OR MORE respiratory symptoms: cough, sore throat, runny or stuffy nose   Negative: Oxygen level (e.g., pulse oximetry) 91 to 94%   Negative: COVID-19 infection suspected by caller or triager and mild symptoms (cough, fever, or others) and negative COVID-19 rapid test   Negative: Fever present > 3 days (72 hours)   Negative: Fever returns after gone for over 24 hours and symptoms worse or not improved   Negative: Continuous (nonstop) coughing interferes with work or school and no improvement using cough treatment per Care Advice   Negative: Cough present > 3 weeks   Negative: COVID-19 diagnosed by positive lab test (e.g., PCR, rapid self-test kit) and NO symptoms (e.g., cough, fever, others)    Protocols used: Coronavirus  (COVID-19) Diagnosed or [facility]-AD

## 2024-02-11 ENCOUNTER — TRANSFERRED RECORDS (OUTPATIENT)
Dept: HEALTH INFORMATION MANAGEMENT | Facility: CLINIC | Age: 44
End: 2024-02-11
Payer: COMMERCIAL

## 2024-03-11 ENCOUNTER — ANCILLARY PROCEDURE (OUTPATIENT)
Dept: MAMMOGRAPHY | Facility: CLINIC | Age: 44
End: 2024-03-11
Payer: COMMERCIAL

## 2024-03-11 DIAGNOSIS — Z12.31 VISIT FOR SCREENING MAMMOGRAM: ICD-10-CM

## 2024-03-11 PROCEDURE — 77063 BREAST TOMOSYNTHESIS BI: CPT | Mod: TC | Performed by: STUDENT IN AN ORGANIZED HEALTH CARE EDUCATION/TRAINING PROGRAM

## 2024-03-11 PROCEDURE — 77067 SCR MAMMO BI INCL CAD: CPT | Mod: TC | Performed by: STUDENT IN AN ORGANIZED HEALTH CARE EDUCATION/TRAINING PROGRAM

## 2024-03-18 ENCOUNTER — MYC MEDICAL ADVICE (OUTPATIENT)
Dept: OBGYN | Facility: CLINIC | Age: 44
End: 2024-03-18
Payer: COMMERCIAL

## 2024-04-03 ENCOUNTER — MYC REFILL (OUTPATIENT)
Dept: FAMILY MEDICINE | Facility: CLINIC | Age: 44
End: 2024-04-03
Payer: COMMERCIAL

## 2024-04-03 NOTE — TELEPHONE ENCOUNTER
If I have never prescribed I will need more information -   I don't even have a diagnosis to like the order to?  She can do evisit and give me up date or schedule video visit  Thanks  PN

## 2024-04-04 RX ORDER — VENLAFAXINE 25 MG/1
25 TABLET ORAL DAILY
OUTPATIENT
Start: 2024-04-04

## 2024-04-05 NOTE — TELEPHONE ENCOUNTER
I would suggest she continue to schedule and follow up with the originally prescribing provider as this is not typical for me to manage.     Becky Corrigan Masters, DO

## 2024-04-11 ENCOUNTER — VIRTUAL VISIT (OUTPATIENT)
Dept: OBGYN | Facility: CLINIC | Age: 44
End: 2024-04-11
Payer: COMMERCIAL

## 2024-04-11 DIAGNOSIS — F32.A DEPRESSION, UNSPECIFIED DEPRESSION TYPE: Primary | ICD-10-CM

## 2024-04-11 PROCEDURE — 99441 PR PHYSICIAN TELEPHONE EVALUATION 5-10 MIN: CPT | Mod: 93 | Performed by: OBSTETRICS & GYNECOLOGY

## 2024-04-11 RX ORDER — VENLAFAXINE 25 MG/1
25 TABLET ORAL DAILY
Qty: 90 TABLET | Refills: 4 | Status: SHIPPED | OUTPATIENT
Start: 2024-04-11 | End: 2024-05-13

## 2024-04-11 NOTE — PROGRESS NOTES
Janay Tam is a 43 year old female who is being evaluated via a billable telephone visit.      What phone number would you like to be contacted at? [phone]   How would you like to obtain your AVS? Gela      Originating Location (pt. Location): at Home/ [place]      Distant Location (provider location):  [facility] [place]      SUBJECTIVE:                                                   Janay Tam is a 43 year old female who presents for virtual visit today for the following health issue(s):  Patient presents with:  Medication Follow-up      HPI:  Would like refill on venlafaxine. Was told at [facility] where has been before that her primary can manage at this point. Has been on for 2yrs. Is stable, no issues.   Does not really follow with a PCP regularly. Comes here.     Since polypectomy, no AUB. Occ midcycle cramping.     No LMP recorded..     Patient is sexually active, .  Using vasectomy for contraception.    reports that she has never smoked. She has never used smokeless tobacco.    STD testing offered?  Declined    Health maintenance updated:  yes    Today's PHQ-2 Score:       4/10/2024     5:11 PM   PHQ-2 (  [employer])   Q1: Little interest or pleasure in doing things 0   Q2: Feeling down, depressed or hopeless 1   PHQ-2 Score 1   Q1: Little interest or pleasure in doing things Not at all   Q2: Feeling down, depressed or hopeless Several days   PHQ-2 Score 1     Today's PHQ-9 Score:       3/2/2023    10:14 AM   PHQ-9 SCORE   PHQ-9 Total Score 1     Today's MILA-7 Score:       3/2/2023    10:14 AM   MILA-7 SCORE   Total Score 2       Problem list and histories reviewed & adjusted, as indicated.  Additional history: as documented.    Patient Active Problem List   Diagnosis    Allergic rhinitis    CARDIOVASCULAR SCREENING; LDL GOAL LESS THAN 160    Anxiety    Gastroesophageal reflux disease without esophagitis    Stress incontinence in female    Irritable bowel syndrome      Past Surgical History:   Procedure Laterality Date    C IUD,MIRENA  12/2010    CYSTOSCOPY, SLING TRANSOBTURATOR N/A 8/5/2020    Procedure: MIDURETHRAL SLING WITH DIAGNOSTIC CYSTOSCOPY;  Surgeon: Sanjeev Pozo MD;  Location:  OR      Social History     Tobacco Use    Smoking status: Never    Smokeless tobacco: Never   Substance Use Topics    Alcohol use: Not Currently     Alcohol/week: 0.0 standard drinks of alcohol     Comment: very rare      Problem (# of Occurrences) Relation (Name,Age of Onset)    Parkinsonism (1) Mother    Arthritis (1) Paternal Grandmother    Depression (1) Mother    Diabetes (1) Maternal Grandfather    Hypertension (1) Mother    Neurologic Disorder (2) Sister: mental health, Brother: mental health    Osteoporosis (1) Paternal Grandmother    Psychotic Disorder (1) Mother    Cerebrovascular Disease (2) Father, Paternal Grandmother    Thyroid Disease (2) Mother, Sister    Breast Cancer (2) Maternal Cousin, Maternal Cousin    C.A.D. (2) Father: heart problems, Maternal Grandfather: heart problems    Genetic Disease (1) Sister    Hyperlipidemia (1) Mother    Skin Cancer (1) Sister              Current Outpatient Medications   Medication Sig Dispense Refill    famotidine (PEPCID) 10 MG tablet Take 10 mg by mouth as needed      MULTIVITAMINS OR TABS ONE DAILY 100 1 YEAR    nystatin-triamcinolone (MYCOLOG II) 093132-2.1 UNIT/GM-% external cream APPLY TO THE AFFECTED AREA(S) BY TOPICAL ROUTE 2 TIMES PER DAY IN THE MORNING AND EVENING      Omega-3 Fatty Acids (FISH OIL PO)       triamcinolone (KENALOG) 0.1 % external cream APPLY TO THE AFFECTED AREA(S) BY TOPICAL ROUTE 2 TIMES PER DAY IN THE MORNING AND EVENING for 3 days maximum at a time 15 g 1    venlafaxine (EFFEXOR) 25 MG tablet Take 1 tablet by mouth daily       No current facility-administered medications for this visit.     Allergies   Allergen Reactions    Cat Hair Extract Itching    Chicken-Derived Products (Egg) Other (See  Comments)    Clindamycin     Milk (Cow) Other (See Comments)    Pcn [Penicillins]     Wheat Other (See Comments)    Penicillin G Rash         ASSESSMENT/PLAN:                                                      Phone call time: 8 min., 6 additional min on DOS in chart review, documentation.       ICD-10-CM    1. Depression, unspecified depression type  F32.A               -Hx chronic depression, stable on effexor. No complaints.   Will refill for the year. Discussed considering either keeping a relationship with [facility] if needs med adjustments in future, or relationship with PCP for med adjustments if needed as I am happy to refill but would look to one of the other entities to adjust meds. Patient verbalizes understanding.  -Due for women's health exam. Has had her mammo last mo.       Becky Corrigan Masters, DO  [facility]

## 2024-04-14 ENCOUNTER — HEALTH MAINTENANCE LETTER (OUTPATIENT)
Age: 44
End: 2024-04-14

## 2024-05-13 ENCOUNTER — OFFICE VISIT (OUTPATIENT)
Dept: OBGYN | Facility: CLINIC | Age: 44
End: 2024-05-13
Payer: COMMERCIAL

## 2024-05-13 VITALS
DIASTOLIC BLOOD PRESSURE: 74 MMHG | SYSTOLIC BLOOD PRESSURE: 122 MMHG | WEIGHT: 148 LBS | BODY MASS INDEX: 26.22 KG/M2 | HEIGHT: 63 IN

## 2024-05-13 DIAGNOSIS — Z13.6 ENCOUNTER FOR LIPID SCREENING FOR CARDIOVASCULAR DISEASE: ICD-10-CM

## 2024-05-13 DIAGNOSIS — R92.333 HETEROGENEOUSLY DENSE TISSUE OF BOTH BREASTS ON MAMMOGRAPHY: ICD-10-CM

## 2024-05-13 DIAGNOSIS — Z80.3 FAMILY HISTORY OF BREAST CANCER: ICD-10-CM

## 2024-05-13 DIAGNOSIS — F32.A DEPRESSION, UNSPECIFIED DEPRESSION TYPE: ICD-10-CM

## 2024-05-13 DIAGNOSIS — Z01.419 ENCOUNTER FOR GYNECOLOGICAL EXAMINATION WITHOUT ABNORMAL FINDING: Primary | ICD-10-CM

## 2024-05-13 DIAGNOSIS — Z12.11 SCREEN FOR COLON CANCER: ICD-10-CM

## 2024-05-13 DIAGNOSIS — N94.3 PMS (PREMENSTRUAL SYNDROME): ICD-10-CM

## 2024-05-13 DIAGNOSIS — Z13.220 ENCOUNTER FOR LIPID SCREENING FOR CARDIOVASCULAR DISEASE: ICD-10-CM

## 2024-05-13 DIAGNOSIS — Z13.228 SCREENING FOR METABOLIC DISORDER: ICD-10-CM

## 2024-05-13 PROCEDURE — 99214 OFFICE O/P EST MOD 30 MIN: CPT | Mod: 25 | Performed by: OBSTETRICS & GYNECOLOGY

## 2024-05-13 PROCEDURE — 99459 PELVIC EXAMINATION: CPT | Performed by: OBSTETRICS & GYNECOLOGY

## 2024-05-13 PROCEDURE — 99396 PREV VISIT EST AGE 40-64: CPT | Performed by: OBSTETRICS & GYNECOLOGY

## 2024-05-13 RX ORDER — VENLAFAXINE 25 MG/1
TABLET ORAL
Qty: 134 TABLET | Refills: 4 | Status: SHIPPED | OUTPATIENT
Start: 2024-05-13

## 2024-05-13 RX ORDER — VENLAFAXINE 25 MG/1
75 TABLET ORAL DAILY
Qty: 42 TABLET | Refills: 0 | Status: CANCELLED | OUTPATIENT
Start: 2024-05-13 | End: 2024-05-27

## 2024-05-13 ASSESSMENT — ANXIETY QUESTIONNAIRES
2. NOT BEING ABLE TO STOP OR CONTROL WORRYING: NOT AT ALL
5. BEING SO RESTLESS THAT IT IS HARD TO SIT STILL: NOT AT ALL
6. BECOMING EASILY ANNOYED OR IRRITABLE: NOT AT ALL
3. WORRYING TOO MUCH ABOUT DIFFERENT THINGS: NOT AT ALL
1. FEELING NERVOUS, ANXIOUS, OR ON EDGE: NOT AT ALL
GAD7 TOTAL SCORE: 2
7. FEELING AFRAID AS IF SOMETHING AWFUL MIGHT HAPPEN: MORE THAN HALF THE DAYS
IF YOU CHECKED OFF ANY PROBLEMS ON THIS QUESTIONNAIRE, HOW DIFFICULT HAVE THESE PROBLEMS MADE IT FOR YOU TO DO YOUR WORK, TAKE CARE OF THINGS AT HOME, OR GET ALONG WITH OTHER PEOPLE: NOT DIFFICULT AT ALL
GAD7 TOTAL SCORE: 2

## 2024-05-13 ASSESSMENT — PATIENT HEALTH QUESTIONNAIRE - PHQ9
SUM OF ALL RESPONSES TO PHQ QUESTIONS 1-9: 1
5. POOR APPETITE OR OVEREATING: NOT AT ALL

## 2024-05-13 NOTE — PROGRESS NOTES
Janay is a 43 year old  female who presents for annual exam.     Besides routine health maintenance, she has no other health concerns today .    HPI:  The patient's NO PCP .      Having more PMS sx leading up to her period irritability.     Genetic counseling , dx with variants of uncertain significance:  BARD1: c.57G>C (p.Zrv96Epa)   PDGFRA: c.1450G>A (p.Kyc690Hfn)    Mammogram in March. Heterogenously dense.  Due for colonoscopy  Follows with Derm regularly, yearly in November.         GYNECOLOGIC HISTORY:    Patient's last menstrual period was 2024.    Regular menses? yes  Menses every 28 days.  Length of menses: 5 days    Her current contraception method is: none and vasectomy.  She  reports that she has never smoked. She has never used smokeless tobacco.    Patient is sexually active.  STD testing offered?  Declined  Last PHQ-9 score on record =       2024     3:07 PM   PHQ-9 SCORE   PHQ-9 Total Score 1     Last GAD7 score on record =       2024     3:07 PM   MILA-7 SCORE   Total Score 2     Alcohol Score = 0    HEALTH MAINTENANCE:    Care Gaps  Close care gaps  Overdue          Never done ADVANCE CARE PLANNING (Every 5 Years)     Never done GLUCOSE (Every 3 Years)     Never done HEPATITIS C SCREENING (Once)     Never done HEPATITIS B IMMUNIZATION (1 of 3 - 19+ 3-dose series)     Never done LIPID (Every 5 Years)     MAY 9  2022 HPV IMMUNIZATION (2 - 3-dose SCDM series)  Last completed: 2022              Upcoming          2024 DTAP/TDAP/TD IMMUNIZATION (5 - Td or Tdap)  Last completed: 2014     MAR 11  2026 MAMMO SCREENING (Every 2 Years)  Last completed: Mar 11, 2024     DEC 8  2026 HPV TEST (Once)  Last completed: Dec 8, 2021     DEC 8  2026 PAP (Every 5 Years)   Last completed: Dec 8, 2021     Health maintenance updated:  yes    HISTORY:  OB History    Para Term  AB Living   3 3 3 0 0 3   SAB IAB Ectopic Multiple Live Births   0 0 0 0 3      #  Outcome Date GA Lbr John/2nd Weight Sex Type Anes PTL Lv   3 Term 02/10/15 38w5d 01:15 / 03:05 3.912 kg (8 lb 10 oz) M Vag-Spont EPI  EDU      Apgar1: 8  Apgar5: 9   2 Term 10/09/10 39w0d  3.572 kg (7 lb 14 oz) M  EPI  EDU      Birth Comments: CMV+,  son with hearing impairment   1 Term 10/31/08 38w0d  3.289 kg (7 lb 4 oz) M    EDU       Patient Active Problem List   Diagnosis    Allergic rhinitis    CARDIOVASCULAR SCREENING; LDL GOAL LESS THAN 160    Anxiety    Gastroesophageal reflux disease without esophagitis    Stress incontinence in female    Irritable bowel syndrome     Past Surgical History:   Procedure Laterality Date    C IUD,MIRENA  2010    CYSTOSCOPY, SLING TRANSOBTURATOR N/A 2020    Procedure: MIDURETHRAL SLING WITH DIAGNOSTIC CYSTOSCOPY;  Surgeon: Sanjeev Pozo MD;  Location:  OR      Social History     Tobacco Use    Smoking status: Never    Smokeless tobacco: Never   Substance Use Topics    Alcohol use: Not Currently     Alcohol/week: 0.0 standard drinks of alcohol     Comment: very rare      Problem (# of Occurrences) Relation (Name,Age of Onset)    Parkinsonism (1) Mother    Arthritis (1) Paternal Grandmother    Depression (1) Mother    Diabetes (1) Maternal Grandfather    Hypertension (1) Mother    Neurologic Disorder (2) Sister: mental health, Brother: mental health    Osteoporosis (1) Paternal Grandmother    Psychotic Disorder (1) Mother    Cerebrovascular Disease (2) Father, Paternal Grandmother    Thyroid Disease (2) Mother, Sister    Breast Cancer (2) Maternal Cousin, Maternal Cousin    C.A.D. (2) Father: heart problems, Maternal Grandfather: heart problems    Genetic Disease (1) Sister    Hyperlipidemia (1) Mother    Skin Cancer (1) Sister    Uterine Cancer (1) Maternal Grandmother (83)              Current Outpatient Medications   Medication Sig Dispense Refill    venlafaxine (EFFEXOR) 25 MG tablet Take 25mg daily. Then two weeks after menstrual cycle starts, take  "50mg daily for 14 days until next menstrual cycle. 134 tablet 4    famotidine (PEPCID) 10 MG tablet Take 10 mg by mouth as needed (Patient not taking: Reported on 5/13/2024)      MULTIVITAMINS OR TABS ONE DAILY (Patient not taking: Reported on 5/13/2024) 100 1 YEAR    nystatin-triamcinolone (MYCOLOG II) 082698-3.1 UNIT/GM-% external cream APPLY TO THE AFFECTED AREA(S) BY TOPICAL ROUTE 2 TIMES PER DAY IN THE MORNING AND EVENING (Patient not taking: Reported on 5/13/2024)      Omega-3 Fatty Acids (FISH OIL PO)  (Patient not taking: Reported on 5/13/2024)      triamcinolone (KENALOG) 0.1 % external cream APPLY TO THE AFFECTED AREA(S) BY TOPICAL ROUTE 2 TIMES PER DAY IN THE MORNING AND EVENING for 3 days maximum at a time (Patient not taking: Reported on 5/13/2024) 15 g 1     No current facility-administered medications for this visit.     Allergies   Allergen Reactions    Cat Hair Extract Itching    Chicken-Derived Products (Egg) Other (See Comments)    Clindamycin     Milk (Cow) Other (See Comments)    Pcn [Penicillins]     Wheat Other (See Comments)    Penicillin G Rash       Past medical, surgical, social and family histories were reviewed and updated in EPIC.    ROS:   12 point review of systems negative other than symptoms noted below or in the HPI.  No urinary frequency or dysuria, bladder or kidney problems    EXAM:  /74   Ht 1.6 m (5' 3\")   Wt 67.1 kg (148 lb)   LMP 04/25/2024   BMI 26.22 kg/m     BMI: Body mass index is 26.22 kg/m .    PHYSICAL EXAM:  Constitutional:   Appearance: Well nourished, well developed, alert, in no acute distress  Neck:  Lymph Nodes:  No lymphadenopathy present    Thyroid:  Gland size normal, nontender, no nodules or masses present  on palpation  Chest:  Respiratory Effort:  Breathing unlabored  Cardiovascular:    Heart: Auscultation:  Regular rate, normal rhythm, no murmurs present  Breasts: Inspection of Breasts:  No lymphadenopathy present., Palpation of Breasts and " Axillae:  No masses present on palpation, no breast tenderness., Axillary Lymph Nodes:  No lymphadenopathy present., and No nodularity, asymmetry or nipple discharge bilaterally.  Gastrointestinal:   Abdominal Examination:  Abdomen nontender to palpation, tone normal without rigidity or guarding, no masses present, umbilicus without lesions   Liver and Spleen:  No hepatomegaly present, liver nontender to palpation    Hernias:  No hernias present  Lymphatic: Lymph Nodes:  No other lymphadenopathy present  Skin:  General Inspection:  No rashes present, no lesions present, no areas of  discoloration  Neurologic:    Mental Status:  Oriented X3.  Normal strength and tone, sensory exam                grossly normal, mentation intact and speech normal.    Psychiatric:   Mentation appears normal and affect normal/bright.         Pelvic Exam:  External Genitalia:     Normal appearance for age, no discharge present, no tenderness present, no inflammatory lesions present, color normal  Vagina:     Normal vaginal vault without central or paravaginal defects, no discharge present, no inflammatory lesions present, no masses present  Bladder:     Nontender to palpation  Urethra:   Urethral Body:  Urethra palpation normal, urethra structural support normal   Urethral Meatus:  No erythema or lesions present  Cervix:     Appearance healthy, no lesions present, nontender to palpation, no bleeding present  Uterus:     Uterus: firm, normal sized and nontender, midplane in position.   Adnexa:     No adnexal tenderness present, no adnexal masses present  Perineum:     Perineum within normal limits, no evidence of trauma, no rashes or skin lesions present  Anus:     Anus within normal limits, no hemorrhoids present  Inguinal Lymph Nodes:     No lymphadenopathy present  Pubic Hair:     Normal pubic hair distribution for age  Genitalia and Groin:     No rashes present, no lesions present, no areas of discoloration, no masses  present    COUNSELING:   Reviewed preventive health counseling, as reflected in patient instructions       Osteoporosis prevention/bone health    BMI: Body mass index is 26.22 kg/m .      ASSESSMENT:  43 year old female with satisfactory annual exam.    ICD-10-CM    1. Encounter for gynecological examination without abnormal finding  Z01.419       2. PMS (premenstrual syndrome)  N94.3       3. Depression, unspecified depression type  F32.A venlafaxine (EFFEXOR) 25 MG tablet      4. Family history of breast cancer  Z80.3 MR Breast Bilateral w/o & w Contrast      5. Screen for colon cancer  Z12.11 Colonoscopy Screening  Referral      6. Encounter for lipid screening for cardiovascular disease  Z13.220 Lipid panel reflex to direct LDL Fasting    Z13.6       7. Screening for metabolic disorder  Z13.228 Lipid panel reflex to direct LDL Fasting     Glucose      8. Heterogeneously dense tissue of both breasts on mammography  R92.333 MR Breast Bilateral w/o & w Contrast          PLAN:  -UTD for cervical cancer screening.   -Breast self awareness discussed. UTD for mammogram.Breasts heterogen. Dense, strong Fhx breast ca, has VUS. Discusses starting additional screening with MRI. Patient interested.   Discussed returning to genetic counselor if changes in femaily history  -Colonoscopy ordered  -Osteoporosis prevention discussed.  -Plan fasting metabolic labs when patient returns fasting  -PMS. Chronic. Rec upping effexor from 25mg daily to 50mg daily for the 2 wks priior to her period.    -Chronic depression. Effexor stable dose. Refillf or the year  -Return one year for next annual exam    (25 additional minutes were spent on the date of the encounter doing chart review, review of epic, review and interpretation of pertinent test results, documentation and patient counseling,  well as the typical preventative women's health exam.      Becky Corrigan Masters, DO

## 2024-05-22 ENCOUNTER — LAB (OUTPATIENT)
Dept: LAB | Facility: CLINIC | Age: 44
End: 2024-05-22
Payer: COMMERCIAL

## 2024-05-22 DIAGNOSIS — Z13.228 SCREENING FOR METABOLIC DISORDER: ICD-10-CM

## 2024-05-22 DIAGNOSIS — Z13.6 ENCOUNTER FOR LIPID SCREENING FOR CARDIOVASCULAR DISEASE: ICD-10-CM

## 2024-05-22 DIAGNOSIS — Z13.220 ENCOUNTER FOR LIPID SCREENING FOR CARDIOVASCULAR DISEASE: ICD-10-CM

## 2024-05-22 LAB
CHOLEST SERPL-MCNC: 165 MG/DL
FASTING STATUS PATIENT QL REPORTED: YES
FASTING STATUS PATIENT QL REPORTED: YES
GLUCOSE SERPL-MCNC: 82 MG/DL (ref 70–99)
HDLC SERPL-MCNC: 66 MG/DL
LDLC SERPL CALC-MCNC: 91 MG/DL
NONHDLC SERPL-MCNC: 99 MG/DL
TRIGL SERPL-MCNC: 39 MG/DL

## 2024-05-22 PROCEDURE — 80061 LIPID PANEL: CPT

## 2024-05-22 PROCEDURE — 82947 ASSAY GLUCOSE BLOOD QUANT: CPT

## 2024-05-22 PROCEDURE — 36415 COLL VENOUS BLD VENIPUNCTURE: CPT

## 2024-06-25 ENCOUNTER — TELEPHONE (OUTPATIENT)
Dept: GASTROENTEROLOGY | Facility: CLINIC | Age: 44
End: 2024-06-25
Payer: COMMERCIAL

## 2024-06-25 NOTE — TELEPHONE ENCOUNTER
"Endoscopy Scheduling Screen    Have you had a positive Covid test in the last 14 days?  No      What is your communication preference for Instructions and/or Bowel Prep?   MyChart      What insurance is in the chart?  Other:      Ordering/Referring Provider: ROSA PINO   (If ordering provider performs procedure, schedule with ordering provider unless otherwise instructed. )    BMI: Estimated body mass index is 26.22 kg/m  as calculated from the following:    Height as of 5/13/24: 1.6 m (5' 3\").    Weight as of 5/13/24: 67.1 kg (148 lb).     Sedation Ordered  moderate sedation.   If patient BMI > 50 do not schedule in [facility].    If patient BMI > 45 do not schedule at [facility].    Are you taking methadone or Suboxone?  No    Have you had difficulties, pain, or discomfort during past endoscopy procedures?  No    Are you taking any prescription medications for pain 3 or more times per week?   NO, No RN review required.      Do you have a history of malignant hyperthermia?  No      (Females) Are you currently pregnant?   No       Have you been diagnosed or told you have pulmonary hypertension?   No      Do you have an LVAD?  No      Have you been told you have moderate to severe sleep apnea?  No    Have you been told you have COPD, asthma, or any other lung disease?  No      Do you have any heart conditions?  No       Have you ever had or are you waiting for an organ transplant?  No. Continue scheduling, no site restrictions.      Have you had a stroke or transient ischemic attack (TIA aka \"mini stroke\" in the last 6 months?   No      Have you been diagnosed with or been told you have cirrhosis of the liver?   No      Are you currently on dialysis?   No      Do you need assistance transferring?   No    BMI: Estimated body mass index is 26.22 kg/m  as calculated from the following:    Height as of 5/13/24: 1.6 m (5' 3\").    Weight as of 5/13/24: 67.1 kg (148 lb).     Is patients BMI > 40 and scheduling location " [facility]?  No      Do you take an injectable medication for weight loss or diabetes (excluding insulin)?  No    Do you take the medication Naltrexone?  No    Do you take blood thinners?  No       Prep   Are you currently on dialysis or do you have chronic kidney disease?  No    Do you have a diagnosis of diabetes?  No    Do you have a diagnosis of cystic fibrosis (CF)?  No    On a regular basis do you go 3 -5 days between bowel movements?  No    BMI > 40?  No    Preferred Pharmacy:    [facility] - [place], [place] - [address] [facility] [address]  Phone: [phone] Fax: [phone]      Final Scheduling Details     Procedure scheduled  Colonoscopy    Surgeon:  ISREAL     Date of procedure:  9/10/24    Pre-OP / PAC:   No - Not required for this site.    Location  [facility] - Patient preference.    Sedation   Moderate Sedation - Per order.      Patient Reminders:   You will receive a call from a Nurse to review instructions and health history.  This assessment must be completed prior to your procedure.  Failure to complete the Nurse assessment may result in the procedure being cancelled.      On the day of your procedure, please designate an adult(s) who can drive you home stay with you for the next 24 hours. The medicines used in the exam will make you sleepy. You will not be able to drive.      You cannot take public transportation, ride share services, or non-medical taxi service without a responsible caregiver.  Medical transport services are allowed with the requirement that a responsible caregiver will receive you at your destination.  We require that drivers and caregivers are confirmed prior to your procedure.

## 2024-08-21 ENCOUNTER — ANCILLARY PROCEDURE (OUTPATIENT)
Dept: MRI IMAGING | Facility: CLINIC | Age: 44
End: 2024-08-21
Attending: OBSTETRICS & GYNECOLOGY
Payer: COMMERCIAL

## 2024-08-21 DIAGNOSIS — Z80.3 FAMILY HISTORY OF BREAST CANCER: ICD-10-CM

## 2024-08-21 DIAGNOSIS — R92.333 HETEROGENEOUSLY DENSE TISSUE OF BOTH BREASTS ON MAMMOGRAPHY: ICD-10-CM

## 2024-08-21 PROCEDURE — 77049 MRI BREAST C-+ W/CAD BI: CPT

## 2024-08-21 PROCEDURE — 255N000002 HC RX 255 OP 636: Performed by: OBSTETRICS & GYNECOLOGY

## 2024-08-21 PROCEDURE — A9585 GADOBUTROL INJECTION: HCPCS | Performed by: OBSTETRICS & GYNECOLOGY

## 2024-08-21 RX ORDER — GADOBUTROL 604.72 MG/ML
7 INJECTION INTRAVENOUS ONCE
Status: COMPLETED | OUTPATIENT
Start: 2024-08-21 | End: 2024-08-21

## 2024-08-21 RX ADMIN — GADOBUTROL 7 ML: 604.72 INJECTION INTRAVENOUS at 09:04

## 2024-09-04 ENCOUNTER — TELEPHONE (OUTPATIENT)
Dept: GASTROENTEROLOGY | Facility: CLINIC | Age: 44
End: 2024-09-04
Payer: COMMERCIAL

## 2024-09-04 NOTE — TELEPHONE ENCOUNTER
Caller: Janay Tam   Reason for Reschedule/Cancellation (please be detailed, any staff messages or encounters to note?):     Per pt -- channge date       Prior to reschedule please review:  Ordering Provider:     Becky Raya, DO      Sedation Determined: mod   Does patient have any ASC Exclusions, please identify?: n       Notes on Cancelled Procedure:  Procedure:Lower Endoscopy [Colonoscopy]   Date: 09/10/2024  Location:[facility]; [address] [place] [address]  Surgeon: Ludwin         Rescheduled: Yes   Procedure: Lower Endoscopy [Colonoscopy]  Date: 11/06/2024  Location: [facility]; [address] [place] [address]   Surgeon: YOVANA Leigh   Sedation Level Scheduled  Mod          Reason for Sedation Level per order   Prep/Instructions updated and sent: mychart     Does patient need PAC or Pre -Op Rescheduled? : n       Send In - basket message to Panc - Giovany Pool if EUS procedure is canceled or rescheduled: [ N/A, YES or NO] n

## 2024-10-09 ENCOUNTER — TELEPHONE (OUTPATIENT)
Dept: OBGYN | Facility: CLINIC | Age: 44
End: 2024-10-09
Payer: COMMERCIAL

## 2024-10-09 NOTE — TELEPHONE ENCOUNTER
Pt sent apt request to Dr Raya to see her for hemorrhoids    RN called and left detailed vm - more info and want to give guidance. Not something DR Raya would see her for and possibly send her to colon and rectal.    Sent [employer] message for supportive tx at home and guidance    Candace Waters RN on 10/9/2024 at 12:07 PM

## 2024-10-10 NOTE — TELEPHONE ENCOUNTER
RN called   She is aware of home care measures and has an apt at [facility] for hemorrhoid assessment.  RN sent mychart yesterday and advised pt to review it. Has Colon & Rectal surgery and assoc number in there as well or suggested PCP.     Pt verbalized understanding, in agreement with plan, and voiced no further questions. She will check her mychart and call w further questions.    Candace Waters RN on 10/10/2024 at 3:10 PM

## 2024-10-15 ENCOUNTER — TRANSFERRED RECORDS (OUTPATIENT)
Dept: HEALTH INFORMATION MANAGEMENT | Facility: CLINIC | Age: 44
End: 2024-10-15
Payer: COMMERCIAL

## 2024-10-25 ENCOUNTER — TELEPHONE (OUTPATIENT)
Dept: GASTROENTEROLOGY | Facility: CLINIC | Age: 44
End: 2024-10-25
Payer: COMMERCIAL

## 2024-10-25 NOTE — TELEPHONE ENCOUNTER
Caller:     Reason for Reschedule/Cancellation   (please be detailed, any staff messages or encounters to note?): BARBARA SMITH      Prior to reschedule please review:  Ordering Provider:     ROSA PINO     Sedation Determined: CS  Does patient have any ASC Exclusions, please identify?:       Notes on Cancelled Procedure:  Procedure: Lower Endoscopy [Colonoscopy]   Date: 11/6  Location: [facility]; [address] [place] [address]   Surgeon: BARBARA      Rescheduled: Yes,   Procedure: Lower Endoscopy [Colonoscopy]    Date: 11/7   Location: [facility]; [address] [place] [address]    Surgeon: ISREAL   Sedation Level Scheduled  CS ,  Reason for Sedation Level ORDER   Instructions updated and sent: Y     Does patient need PAC or Pre -Op Rescheduled? : N       Did you cancel or rescheduled an EUS procedure? No.

## 2024-10-28 ENCOUNTER — TELEPHONE (OUTPATIENT)
Dept: GASTROENTEROLOGY | Facility: CLINIC | Age: 44
End: 2024-10-28
Payer: COMMERCIAL

## 2024-10-28 NOTE — TELEPHONE ENCOUNTER
Pre assessment completed for upcoming procedure.   (Please see previous telephone encounter notes for complete details)      Procedure details:    Arrival time and facility location reviewed.    Pre op exam needed? No.    Designated  policy reviewed. Instructed to have someone stay 6 hours post procedure.       Medication review:    Medications reviewed. Please see supporting documentation below. Holding recommendations discussed (if applicable).   Phentermine: HOLD 7 days before procedure.      Prep for procedure:     Procedure prep instructions reviewed.        Any additional information needed:  N/A      Patient verbalized understanding and had no questions or concerns at this time.      Ronda Patricia RN  Endoscopy Procedure Pre Assessment   [phone] option 2

## 2024-10-28 NOTE — TELEPHONE ENCOUNTER
Rescheduled Procedure  Due to: patient requested another day/time. then provider out of office.    Pre visit planning completed.      Procedure details:    Patient scheduled for Colonoscopy on 11/7/24.     Arrival time: 0715. Procedure time 0800    Facility location: [facility]; [facility] [place] [address]. Check in location: [address] [place] [address].     Sedation type: Conscious sedation     Pre op exam needed? No.    Indication for procedure: Screening      Chart review:     Electronic implanted devices? No    Recent diagnosis of diverticulitis within the last 6 weeks? No      Medication review:    Diabetic? No    Anticoagulants? No    Weight loss medication/injectable? If taking Phentermine: HOLD 7 days before procedure. (Verify - on external med list)    Other medication HOLDING recommendations:  N/A      Prep for procedure:     Bowel prep recommendation: Standard Miralax  Due to: standard bowel prep.    Prep instructions resent [facility] RN  Endoscopy Procedure Pre Assessment RN  [phone] option 2

## 2024-11-05 ENCOUNTER — OFFICE VISIT (OUTPATIENT)
Dept: DERMATOLOGY | Facility: CLINIC | Age: 44
End: 2024-11-05
Payer: COMMERCIAL

## 2024-11-05 DIAGNOSIS — L57.0 ACTINIC KERATOSIS: Primary | ICD-10-CM

## 2024-11-05 PROCEDURE — 99214 OFFICE O/P EST MOD 30 MIN: CPT | Performed by: PHYSICIAN ASSISTANT

## 2024-11-05 RX ORDER — PHENTERMINE HYDROCHLORIDE 37.5 MG/1
TABLET ORAL
COMMUNITY

## 2024-11-05 RX ORDER — CALCIPOTRIENE 50 UG/G
CREAM TOPICAL
Qty: 60 G | Refills: 1 | Status: SHIPPED | OUTPATIENT
Start: 2024-11-05

## 2024-11-05 RX ORDER — FLUOROURACIL 50 MG/G
CREAM TOPICAL
Qty: 40 G | Refills: 0 | Status: SHIPPED | OUTPATIENT
Start: 2024-11-05

## 2024-11-05 NOTE — PROGRESS NOTES
[facility] Dermatology Note  Encounter Date: Nov 5, 2024  Office Visit     Dermatology Problem List:  Last TBSE 11/5/24, recommend yearly     1. Verruca vulgaris  - bilateral legs x 2, s/p LN2 4/2/21  2. ISKs  - s/p cryo 4/2/21  3. AKs  - right upper forehead x 1 and left upper forehead x 1, s/p cryo 11/7/23  - Efudex/calcipotriene initiated to face 11/5/24     FHx: Skin cancer - unknown type (sister).  SHx: Pt has 3 children.    ____________________________________________    Assessment & Plan:    # Skin cancer screening with multiple benign nevi and solar lentigines.  - ABCDEs: Counseled ABCDEs of melanoma: Asymmetry, Border (irregularity), Color (not uniform, changes in color), Diameter (greater than 6 mm which is about the size of a pencil eraser), and Evolving (any changes in preexisting moles).  - Sun protection: Counseled SPF30+ sunscreen, UPF clothing, sun avoidance, tanning bed avoidance.     # Cherry angiomas.  # Seborrheic keratoses.  - Benign, reassurance given.      # Milia, right upper cheek x 1  - Reviewed extraction. Patient agreeable. See procedure section.     # Actinic keratosis, scattered on the forehead and temples.  -  Start field therapy with topical Chemotherapy.   Start calcipotriene 0.005% cream bid  mixed with equal parts Efudex 5% cream twice daily for five-ten days. Discussed possible side effects of irritation, burning or pruritus.   Teratogenic.   Keep cream away from pets.         Procedures Performed:   After verbal consent, I cleansed the milium with an alcohol swab and lanced the epidermis with an 11 blade. I used sterile cotton applicators to extract the sebum. Pt tolerated this well. No significant bleeding. The sites were covered with a bandage. Simple wound ed addressed.        Follow-up: 3-4 month(s) in-person recheck AKs after Efudex, or earlier for new or changing lesions    Staff and Scribe:     Scribe Disclosure:   I, Fang Em, am serving as a  scribe to document services personally performed by Christie Hanson PA-C based on data collection and the provider's statements to me.     Provider Disclosure:   The documentation recorded by the scribe accurately reflects the services I personally performed and the decisions made by me.    All risks, benefits and alternatives were discussed with patient.  Patient is in agreement and understands the assessment and plan.  All questions were answered.  Sun Screen Education was given.   Return to Clinic in 3-4 months or sooner as needed.   Christie Hanson PA-C   [facility]    ____________________________________________    [place]: [place] [place] Dry forehead, red blotchy areas/Under right eye lesion)    HPI:  Ms. Janay Tam is a(n) 44 year old female who presents today as a return patient for skin check.    Last seen in dermatology 11/7/23 by me for skin check. Two AKs treated with cryotherapy at visit. Otherwise benign skin findings.     Today, patient reports a lesion under R eye. Nothing has expressed from lesion. Also reports dry, red, blotchy areas on the forehead. Reports recurring irritation of vulva. Has used triamcinolone and topical estrogen cream.     Patient is otherwise feeling well, without additional skin concerns.    Labs Reviewed:  N/A    Physical Exam:  Vitals: There were no vitals taken for this visit.  SKIN: Full skin, which includes the head/face, both arms, chest, back, abdomen,both legs, genitalia and/or groin buttocks, digits and/or nails, was examined.  - There is one white 1-2 mm papule on the R upper cheek .   - There are erythematous macules with overyling adherent scale on the forehead and temples. .   - There are dome shaped bright red papules on the trunk and extremities.   - Multiple regular brown pigmented macules and papules are identified on the trunk and extremities.   - Scattered brown macules on sun exposed areas.  - There are waxy  stuck on tan to brown papules on the trunk and extremities.  No cutaneous genital abnormalities noted.    - No other lesions of concern on areas examined.         Medications:  Current Outpatient Medications   Medication Sig Dispense Refill    famotidine (PEPCID) 10 MG tablet Take 10 mg by mouth as needed.      MULTIVITAMINS OR TABS Take by mouth. 100 1 YEAR    nystatin-triamcinolone (MYCOLOG II) 320435-9.1 UNIT/GM-% external cream       Omega-3 Fatty Acids (FISH OIL PO) Take by mouth.      phentermine (ADIPEX-P) 37.5 MG tablet Take 1 tablet every day by oral route.      triamcinolone (KENALOG) 0.1 % external cream APPLY TO THE AFFECTED AREA(S) BY TOPICAL ROUTE 2 TIMES PER DAY IN THE MORNING AND EVENING for 3 days maximum at a time 15 g 1    venlafaxine (EFFEXOR) 25 MG tablet Take 25mg daily. Then two weeks after menstrual cycle starts, take 50mg daily for 14 days until next menstrual cycle. 134 tablet 4     No current facility-administered medications for this visit.      Past Medical History:   Patient Active Problem List   Diagnosis    Allergic rhinitis    CARDIOVASCULAR SCREENING; LDL GOAL LESS THAN 160    Anxiety    Gastroesophageal reflux disease without esophagitis    Stress incontinence in female    Irritable bowel syndrome     Past Medical History:   Diagnosis Date    Allergic rhinitis, cause unspecified     Allergic rhinitis    Cytomegaloviral disease (H) 2010    Second child with hearing loss. 6/14 IgM neg, IgG pos    Depressive disorder, not elsewhere classified     use effexor in the past - more seasonal    Generalized anxiety disorder     GERD (gastroesophageal reflux disease)     Papanicolaou smear of cervix with atypical squamous cells of undetermined significance (ASC-US) 2001    Unsure of outcome    Postpartum depression     with first delivery, not currently medicated    Testing of female for genetic disease carrier status 06/2023    Variants of uncertain signif: BARD1: c.57G>C (p.Tlg13Qan).  PDGFRA: c.1450G>A (p.[id number])        CC No referring provider defined for this encounter. on close of this encounter.

## 2024-11-05 NOTE — LETTER
11/5/2024      Janay Tam  [address]  [place] [address]      Dear Colleague,    Thank you for referring your patient, Janay Tam, to the [facility] [place]. Please see a copy of my visit note below.      [facility] Dermatology Note  Encounter Date: Nov 5, 2024  Office Visit     Dermatology Problem List:  Last TBSE 11/5/24, recommend yearly     1. Verruca vulgaris  - bilateral legs x 2, s/p LN2 4/2/21  2. ISKs  - s/p cryo 4/2/21  3. AKs  - right upper forehead x 1 and left upper forehead x 1, s/p cryo 11/7/23  - Efudex/calcipotriene initiated to face 11/5/24     FHx: Skin cancer - unknown type (sister).  SHx: Pt has 3 children.    ____________________________________________    Assessment & Plan:    # Skin cancer screening with multiple benign nevi and solar lentigines.  - ABCDEs: Counseled ABCDEs of melanoma: Asymmetry, Border (irregularity), Color (not uniform, changes in color), Diameter (greater than 6 mm which is about the size of a pencil eraser), and Evolving (any changes in preexisting moles).  - Sun protection: Counseled SPF30+ sunscreen, UPF clothing, sun avoidance, tanning bed avoidance.     # Cherry angiomas.  # Seborrheic keratoses.  - Benign, reassurance given.      # Milia, right upper cheek x 1  - Reviewed extraction. Patient agreeable. See procedure section.     # Actinic keratosis, scattered on the forehead and temples.  -  Start field therapy with topical Chemotherapy.   Start calcipotriene 0.005% cream bid  mixed with equal parts Efudex 5% cream twice daily for five-ten days. Discussed possible side effects of irritation, burning or pruritus.   Teratogenic.   Keep cream away from pets.         Procedures Performed:   After verbal consent, I cleansed the milium with an alcohol swab and lanced the epidermis with an 11 blade. I used sterile cotton applicators to extract the sebum. Pt tolerated this well. No significant bleeding. The sites were  covered with a bandage. Simple wound ed addressed.        Follow-up: 3-4 month(s) in-person recheck AKs after Efudex, or earlier for new or changing lesions    Staff and Scribe:     Scribe Disclosure:   I, Fang Em, am serving as a scribe to document services personally performed by Christie Hanson PA-C based on data collection and the provider's statements to me.     Provider Disclosure:   The documentation recorded by the scribe accurately reflects the services I personally performed and the decisions made by me.    All risks, benefits and alternatives were discussed with patient.  Patient is in agreement and understands the assessment and plan.  All questions were answered.  Sun Screen Education was given.   Return to Clinic in 3-4 months or sooner as needed.   Christie Hanson PA-C   [facility]    ____________________________________________    CC: Skin Check ([facility] [place]Comcerns : Dry forehead, red blotchy areas/Under right eye lesion)    HPI:  Ms. Janay Tam is a(n) 44 year old female who presents today as a return patient for skin check.    Last seen in dermatology 11/7/23 by me for skin check. Two AKs treated with cryotherapy at visit. Otherwise benign skin findings.     Today, patient reports a lesion under R eye. Nothing has expressed from lesion. Also reports dry, red, blotchy areas on the forehead. Reports recurring irritation of vulva. Has used triamcinolone and topical estrogen cream.     Patient is otherwise feeling well, without additional skin concerns.    Labs Reviewed:  N/A    Physical Exam:  Vitals: There were no vitals taken for this visit.  SKIN: Full skin, which includes the head/face, both arms, chest, back, abdomen,both legs, genitalia and/or groin buttocks, digits and/or nails, was examined.  - There is one white 1-2 mm papule on the R upper cheek .   - There are erythematous macules with overyling adherent scale on the forehead and temples. .   - There  are dome shaped bright red papules on the trunk and extremities.   - Multiple regular brown pigmented macules and papules are identified on the trunk and extremities.   - Scattered brown macules on sun exposed areas.  - There are waxy stuck on tan to brown papules on the trunk and extremities.  No cutaneous genital abnormalities noted.    - No other lesions of concern on areas examined.         Medications:  Current Outpatient Medications   Medication Sig Dispense Refill     famotidine (PEPCID) 10 MG tablet Take 10 mg by mouth as needed.       MULTIVITAMINS OR TABS Take by mouth. 100 1 YEAR     nystatin-triamcinolone (MYCOLOG II) 565926-6.1 UNIT/GM-% external cream        Omega-3 Fatty Acids (FISH OIL PO) Take by mouth.       phentermine (ADIPEX-P) 37.5 MG tablet Take 1 tablet every day by oral route.       triamcinolone (KENALOG) 0.1 % external cream APPLY TO THE AFFECTED AREA(S) BY TOPICAL ROUTE 2 TIMES PER DAY IN THE MORNING AND EVENING for 3 days maximum at a time 15 g 1     venlafaxine (EFFEXOR) 25 MG tablet Take 25mg daily. Then two weeks after menstrual cycle starts, take 50mg daily for 14 days until next menstrual cycle. 134 tablet 4     No current facility-administered medications for this visit.      Past Medical History:   Patient Active Problem List   Diagnosis     Allergic rhinitis     CARDIOVASCULAR SCREENING; LDL GOAL LESS THAN 160     Anxiety     Gastroesophageal reflux disease without esophagitis     Stress incontinence in female     Irritable bowel syndrome     Past Medical History:   Diagnosis Date     Allergic rhinitis, cause unspecified     Allergic rhinitis     Cytomegaloviral disease (H) 2010    Second child with hearing loss. 6/14 IgM neg, IgG pos     Depressive disorder, not elsewhere classified     use effexor in the past - more seasonal     Generalized anxiety disorder      GERD (gastroesophageal reflux disease)      Papanicolaou smear of cervix with atypical squamous cells of undetermined  significance (ASC-US) 2001    Unsure of outcome     Postpartum depression     with first delivery, not currently medicated     Testing of female for genetic disease carrier status 06/2023    Variants of uncertain signif: BARD1: c.57G>C (p.Rbe18Qam). PDGFRA: c.1450G>A (p.[id number])        CC No referring provider defined for this encounter. on close of this encounter.    Again, thank you for allowing me to participate in the care of your patient.        Sincerely,        Christie Hanson PA-C

## 2024-11-05 NOTE — PATIENT INSTRUCTIONS
Start field therapy with topical Chemotherapy.   Start calcipotriene 0.005% cream bid mixed with equal parts Efudex 5% cream twice daily for five- ten days to the face until redness occurs.   Discussed possible side effects of irritation, burning or pruritus.   Teratogenic.   Checking for Skin Cancer  You can help find cancer early by checking your skin each month. There are 3 main kinds of skin cancer: melanoma, basal cell carcinoma, and squamous cell carcinoma. Doing monthly skin checks is the best way to find new marks, sores, or skin changes. Follow these instructions for checking your skin.   The ABCDEs of checking moles for melanoma   Check your moles or growths for signs of melanoma using ABCDE:   Asymmetry: The sides of the mole or growth don t match.  Border: The edges are ragged, notched, or blurred.  Color: The color within the mole or growth varies. It could be black, brown, tan, white, or shades of red, gray, or blue.  Diameter: The mole or growth is larger than   inch or 6 mm (size of a pencil eraser).  Evolving: The size, shape, texture, or color of the mole or growth is changing.     ABCDE's of moles on light skin.        ABCDE's of moles on dark skin may be harder to identify.     Checking for other types of skin cancer  Basal cell carcinoma or squamous cell carcinoma cause symptoms like:     A spot or mole that looks different from all other marks on your skin  Changes in how an area feels, such as itching, tenderness, or pain  Changes in the skin's surface, such as oozing, bleeding, or scaliness  A sore that doesn't heal  New swelling, redness, or spread of color beyond the border of a mole    Who s at risk?  Anyone of any skin color can get skin cancer. But you're at greater risk if you have:   Fair skin that freckles easily and burns instead of tanning  Light-colored or red hair  Light-colored eyes  Many moles or abnormal moles on your skin  A long history of unprotected exposure to  sunlight or tanning beds  A history of many blistering sunburns as a child or teen  A family history of skin cancer  Been exposed to radiation or chemicals  A weakened immune system  Been exposed to arsenic  If you've had skin cancer in the past, you're at high risk of having it again.   How to check your skin  Do your monthly skin checkups in front of a full-length mirror. Use a room with good lighting so it's easier to see. Use a hand mirror to look at hard-to-see places like your buttocks and back. You can also have a trusted friend or family member help you with these checks. Check every part of your body, including your:   Head (ears, face, neck, and scalp)  Torso (front, back, sides, and under breasts)  Arms (tops, undersides, and armpits)  Hands (palms, backs, and fingers, including under the nails)  Lower back, buttocks, and genitals  Legs (front, back, and sides)  Feet (tops, soles, toes, including under the nails, and between toes)  Watch for new spots on your skin or a spot that's changing in color, shape, size.   If you have a lot of moles, take digital photos of them each month. Make sure to take photos both up close and from a distance. These can help you see if any moles change over time.   Know your skin  Most skin changes aren't cancer. But if you see any changes in your skin, call your healthcare provider right away. Only they can tell you if a change is a problem. If you have skin cancer, seeing your provider can be the first step to getting the treatment that could save your life.   [employer] last reviewed this educational content on 10/1/2021    8349-4297 [employer]. All rights reserved. This information is not intended as a substitute for professional medical care. Always follow your healthcare professional's instructions.

## 2024-11-07 ENCOUNTER — HOSPITAL ENCOUNTER (OUTPATIENT)
Facility: CLINIC | Age: 44
Discharge: HOME | End: 2024-11-07
Attending: COLON & RECTAL SURGERY | Admitting: COLON & RECTAL SURGERY
Payer: COMMERCIAL

## 2024-11-07 VITALS
WEIGHT: 148 LBS | SYSTOLIC BLOOD PRESSURE: 102 MMHG | RESPIRATION RATE: 13 BRPM | HEIGHT: 63 IN | HEART RATE: 65 BPM | OXYGEN SATURATION: 97 % | DIASTOLIC BLOOD PRESSURE: 81 MMHG | BODY MASS INDEX: 26.22 KG/M2

## 2024-11-07 LAB — COLONOSCOPY: NORMAL

## 2024-11-07 PROCEDURE — 250N000011 HC RX IP 250 OP 636: Performed by: COLON & RECTAL SURGERY

## 2024-11-07 PROCEDURE — 45378 DIAGNOSTIC COLONOSCOPY: CPT | Performed by: COLON & RECTAL SURGERY

## 2024-11-07 PROCEDURE — G0500 MOD SEDAT ENDO SERVICE >5YRS: HCPCS | Performed by: COLON & RECTAL SURGERY

## 2024-11-07 PROCEDURE — G0121 COLON CA SCRN NOT HI RSK IND: HCPCS | Performed by: COLON & RECTAL SURGERY

## 2024-11-07 RX ORDER — FENTANYL CITRATE 50 UG/ML
INJECTION, SOLUTION INTRAMUSCULAR; INTRAVENOUS PRN
Status: DISCONTINUED | OUTPATIENT
Start: 2024-11-07 | End: 2024-11-07 | Stop reason: HOSPADM

## 2024-11-07 RX ORDER — ONDANSETRON 2 MG/ML
4 INJECTION INTRAMUSCULAR; INTRAVENOUS
Status: COMPLETED | OUTPATIENT
Start: 2024-11-07 | End: 2024-11-07

## 2024-11-07 RX ORDER — LIDOCAINE 40 MG/G
CREAM TOPICAL
Status: DISCONTINUED | OUTPATIENT
Start: 2024-11-07 | End: 2024-11-07 | Stop reason: HOSPADM

## 2024-11-07 RX ADMIN — ONDANSETRON 4 MG: 2 INJECTION INTRAMUSCULAR; INTRAVENOUS at 09:22

## 2024-11-07 ASSESSMENT — ACTIVITIES OF DAILY LIVING (ADL)
ADLS_ACUITY_SCORE: 0

## 2024-11-07 NOTE — H&P
Colon & Rectal Surgery History and Physical  Pre-Endoscopy Procedure Note    History of Present Illness   I have been asked by Dr. Barragan to evaluate this 44 year old female for colorectal cancer screening. She currently denies any abdominal pain, weight loss, bleeding per rectum, or recent change in bowel habits.    Past Medical History  Diagnosis Date    Allergic rhinitis     Cytomegaloviral disease 2010    Second child with hearing loss. 6/14 IgM neg, IgG pos    Depressive disorder     Generalized anxiety disorder     GERD (gastroesophageal reflux disease)     Papanicolaou smear of cervix with atypical squamous cells of undetermined significance (ASC-US) 2001    Postpartum depression     Testing of female for genetic disease carrier status 06/2023    Variants of uncertain signif: BARD1: c.57G>C (p.Pmq07Lof). PDGFRA: c.1450G>A (p.[id number])       Past Surgical History  Procedure Laterality Date    IUD,MIRENA  12/2010    CYSTOSCOPY, SLING TRANSOBTURATOR N/A 8/5/2020    Procedure: MIDURETHRAL SLING WITH DIAGNOSTIC CYSTOSCOPY;  Surgeon: Sanjeev Pozo MD;  Location:  OR        Medications  Medications Prior to Admission   Medication Sig    famotidine (PEPCID) 10 MG tablet Take 10 mg by mouth as needed.    MULTIVITAMINS OR TABS Take by mouth.    Omega-3 Fatty Acids (FISH OIL PO) Take by mouth.    venlafaxine (EFFEXOR) 25 MG tablet Take 25mg daily. Then two weeks after menstrual cycle starts, take 50mg daily for 14 days until next menstrual cycle.    calcipotriene (DOVONOX) 0.005 % external cream Mix efudex + calcipotriene equally. Apply BID x 5-10 days. Stop when skin gets red.    fluorouracil (EFUDEX) 5 % external cream Mix equally with calcipotriene cream. Apply BID x 5-10 days to the face. Stop when skin gets red.    nystatin-triamcinolone (MYCOLOG II) 401359-7.1 UNIT/GM-% external cream     phentermine (ADIPEX-P) 37.5 MG tablet Take 1 tablet every day by oral route.    triamcinolone (KENALOG) 0.1 % external  cream APPLY TO THE AFFECTED AREA(S) BY TOPICAL ROUTE 2 TIMES PER DAY IN THE MORNING AND EVENING for 3 days maximum at a time       Allergies  Allergen Reactions    Cat Hair Extract Itching    Chicken-Derived Products (Egg)     Clindamycin     Milk (Cow)     Pcn [Penicillins]     Wheat     Penicillin G Rash        Family History   Family history includes Arthritis in her paternal grandmother; Breast Cancer in her maternal cousin and maternal cousin; C.A.D. in her father and maternal grandfather; Cerebrovascular Disease in her father and paternal grandmother; Depression in her mother; Diabetes in her maternal grandfather; Genetic Disease in her sister; Hyperlipidemia in her mother; Hypertension in her mother; Neurologic Disorder in her brother and sister; Osteoporosis in her paternal grandmother; Parkinsonism in her mother; Psychotic Disorder in her mother; Skin Cancer in her maternal cousin and sister; Thyroid Disease in her mother and sister; Uterine Cancer (age of onset: 83) in her maternal grandmother.     Social History   She reports that she has never smoked. She has never used smokeless tobacco. She reports that she does not currently use alcohol. She reports current drug use. Drug: Marijuana.    Review of Systems   Constitutional:  No fever, weight change or fatigue.    Eyes:     No dry eyes or vision changes.   Ears/Nose/Throat/Neck:  No oral ulcers, sore throat or voice change.    Cardiovascular:   No palpitations, syncope, angina or edema.   Respiratory:    No chest pain, excessive sleepiness, shortness of breath or hemoptysis.    Gastrointestinal:   No abdominal pain, nausea, vomiting, diarrhea or heartburn.    Genitourinary:   No dysuria, hematuria, urinary retention or urinary frequency.   Musculoskeletal:  No joint swelling or arthralgias.    Dermatologic:  No skin rash or other skin changes.   Neurologic:    No focal weakness or numbness. No neuropathy.   Psychiatric:    No depression, anxiety,  "suicidal ideation, or paranoid ideation.   Endocrine:   No cold or heat intolerance, polydipsia, hirsutism, change in libido, or flushing.   Hematology/Lymphatic:  No bleeding or lymphadenopathy.    Allergy/Immunology:  No rhinitis or hives.     Physical Exam   Vitals:  Blood pressure 112/80, pulse 67, resp. rate 16, height 1.6 m (5' 3\"), weight 67.1 kg (148 lb), SpO2 100%, not currently breastfeeding.    General:  Alert and oriented to person, place and time   Airway: Normal oropharyngeal airway and neck mobility   Lungs:  Clear bilaterally   Heart:  Regular rate and rhythm   Abdomen: Soft, NT, ND, no masses   Extremities: Warm, good capillary refill    ASA Grade: II (mild systemic disease)    Impression: Cleared for use of conscious sedation for colorectal cancer screening    Plan: Proceed with colonoscopy     Albina Mascorro MD  [facility]  [phone]  "

## 2024-11-07 NOTE — OR NURSING
Upon discharge post colonoscopy, patient became nauseous.  Given Zofran IV and essential oil peppermint to smell with positive results.

## 2025-03-04 ENCOUNTER — OFFICE VISIT (OUTPATIENT)
Dept: DERMATOLOGY | Facility: CLINIC | Age: 45
End: 2025-03-04
Payer: COMMERCIAL

## 2025-03-04 DIAGNOSIS — L57.0 ACTINIC KERATOSIS: Primary | ICD-10-CM

## 2025-03-04 NOTE — PROGRESS NOTES
[facility] Dermatology Note  Encounter Date: Mar 4, 2025  Office Visit     Dermatology Problem List:  Last TBSE 11/5/24, recommend yearly     1. Verruca vulgaris  - bilateral legs x 2, s/p LN2 4/2/21  2. ISKs  - s/p cryo 4/2/21  3. AKs  - right upper forehead x 1 and left upper forehead x 1, s/p cryo 11/7/23  - Efudex/calcipotriene initiated to face 11/5/24     FHx: Skin cancer - unknown type (sister).  SHx: Pt has 3 children.    ____________________________________________    Assessment & Plan:    # Actinic keratosis, hx of diffusely through the forehead and temples. (Now just left cheek x 2 and right forehead)  continue field therapy with topical Chemotherapy on the left cheek  continue calcipotriene 0.005% cream bid  mixed with equal parts Efudex 5% cream twice daily for five-ten days. Discussed possible side effects of irritation, burning or pruritus.   Teratogenic.   Keep cream away from pets.   - cryotherapy performed to R upper forehead today, see procedure note below          Procedures Performed:   Cryotherapy procedure note: After verbal consent and discussion of risks and benefits including but no limited to dyspigmentation/scar, blister, and pain, 1 AK was(were) treated with 1-2mm freeze border for 2 cycles with liquid nitrogen on the right upper forehead. Post cryotherapy instructions were provided.       Follow-up: 8 month(s) in-person, or earlier for new or changing lesions    Staff and Scribe:   Scribe Disclosure:   By signing my name below, I, Vanna Rosenberg, attest that this documentation has been prepared under the direction and in the presence of Christie Hanson PA-C.  - Electronically Signed: Vanna Rosenberg 03/04/25       Provider Disclosure:  I agree with above History, Review of Systems, Physical exam and Plan.  I have reviewed the content of the documentation and have edited it as needed. I have personally performed the services documented here and the documentation  accurately represents those services and the decisions I have made.      Electronically signed by:  All risks, benefits and alternatives were discussed with patient.  Patient is in agreement and understands the assessment and plan.  All questions were answered.  Sun Screen Education was given.   Return to Clinic in 8 months or sooner as needed.   Christie Hanson PA-C      ____________________________________________    CC: Derm Problem ( follow up after efudex use on face)    HPI:  Ms. Janay Tam is a(n) 44 year old female who presents today for follow-up  for actinic keratoses.    Patient reports her face improved initially when using efudex . The redness started after completing the treatment and taking time to resolve.     Patient is otherwise feeling well, without additional skin concerns.    Labs Reviewed:  N/A    Physical exam:  Vitals: There were no vitals taken for this visit.  GEN: This is a well developed, well-nourished female in no acute distress, in a pleasant mood.    SKIN: Focused examination of the face was performed.  - There is an erythematous macule with overyling adherent scale on the R upper forehead.  - 2 faint gritty papules on L cheek  - No other lesions of concern on areas examined.       Medications:  Current Outpatient Medications   Medication Sig Dispense Refill    famotidine (PEPCID) 10 MG tablet Take 10 mg by mouth as needed.      MULTIVITAMINS OR TABS Take by mouth. 100 1 YEAR    nystatin-triamcinolone (MYCOLOG II) 055615-6.1 UNIT/GM-% external cream       Omega-3 Fatty Acids (FISH OIL PO) Take by mouth.      phentermine (ADIPEX-P) 37.5 MG tablet Take 1 tablet every day by oral route.      triamcinolone (KENALOG) 0.1 % external cream APPLY TO THE AFFECTED AREA(S) BY TOPICAL ROUTE 2 TIMES PER DAY IN THE MORNING AND EVENING for 3 days maximum at a time 15 g 1    venlafaxine (EFFEXOR) 25 MG tablet Take 25mg daily. Then two weeks after menstrual cycle starts, take 50mg daily for 14 days  until next menstrual cycle. 134 tablet 4    calcipotriene (DOVONOX) 0.005 % external cream Mix efudex + calcipotriene equally. Apply BID x 5-10 days. Stop when skin gets red. (Patient not taking: Reported on 3/4/2025) 60 g 1    fluorouracil (EFUDEX) 5 % external cream Mix equally with calcipotriene cream. Apply BID x 5-10 days to the face. Stop when skin gets red. (Patient not taking: Reported on 3/4/2025) 40 g 0     No current facility-administered medications for this visit.      Past Medical History:   Patient Active Problem List   Diagnosis    Allergic rhinitis    CARDIOVASCULAR SCREENING; LDL GOAL LESS THAN 160    Anxiety    Gastroesophageal reflux disease without esophagitis    Stress incontinence in female    Irritable bowel syndrome     Past Medical History:   Diagnosis Date    Allergic rhinitis, cause unspecified     Allergic rhinitis    Cytomegaloviral disease (H) 2010    Second child with hearing loss. 6/14 IgM neg, IgG pos    Depressive disorder, not elsewhere classified     use effexor in the past - more seasonal    Generalized anxiety disorder     GERD (gastroesophageal reflux disease)     Papanicolaou smear of cervix with atypical squamous cells of undetermined significance (ASC-US) 2001    Unsure of outcome    Postpartum depression     with first delivery, not currently medicated    Testing of female for genetic disease carrier status 06/2023    Variants of uncertain signif: BARD1: c.57G>C (p.Uyd78Flw). PDGFRA: c.1450G>A (p.[id number])        CC No referring provider defined for this encounter. on close of this encounter.

## 2025-03-04 NOTE — PATIENT INSTRUCTIONS
Proper skin care from [facility]:    -Eliminate harsh soaps as they strip the natural oils from the skin, often resulting in dry itchy skin ( i.e. Dial, Zest, Mal[place] Spring)  -Use mild soaps such as Cetaphil or Dove Sensitive Skin in the shower. You do not need to use soap on arms, legs, and trunk every time you shower unless visibly soiled.   -Avoid hot or cold showers.  -After showering, lightly dry off and apply moisturizing within 2-3 minutes. This will help trap moisture in the skin.   -Aggressive use of a moisturizer at least 1-2 times a day to the entire body (including -Vanicream, Cetaphil, Aquaphor or Cerave) and moisturize hands after every washing.  -We recommend using moisturizers that come in a tub that needs to be scooped out, not a pump. This has more of an oil base. It will hold moisture in your skin much better than a water base moisturizer. The above recommended are non-pore clogging.      Wear a sunscreen with at least SPF 30 on your face, ears, neck and V of the chest daily. Wear sunscreen on other areas of the body if those areas are exposed to the sun throughout the day. Sunscreens can contain physical and/or chemical blockers. Physical blockers are less likely to clog pores, these include zinc oxide and titanium dioxide. Reapply every two hour and after swimming.     Sunscreen examples: https://www.ewg.org/sunscreen/    UV radiation  UVA radiation remains constant throughout the day and throughout the year. It is a longer wavelength than UVB and therefore penetrates deeper into the skin leading to immediate and delayed tanning, photoaging, and skin cancer. 70-80% of UVA and UVB radiation occurs between the hours of 10am-2pm.  UVB radiation  UVB radiation causes the most harmful effects and is more significant during the summer months. However, snow and ice can reflect UVB radiation leading to skin damage during the winter months as well. UVB radiation is responsible for tanning,  burning, inflammation, delayed erythema (pinkness), pigmentation (brown spots), and skin cancer.     I recommend self monthly full body exams and yearly full body exams with a dermatology provider. If you develop a new or changing lesion please follow up for examination. Most skin cancers are pink and scaly or pink and pearly. However, we do see blue/brown/black skin cancers.  Consider the ABCDEs of melanoma when giving yourself your monthly full body exam ( don't forget the groin, buttocks, feet, toes, etc). A-asymmetry, B-borders, C-color, D-diameter, E-elevation or evolving. If you see any of these changes please follow up in clinic. If you cannot see your back I recommend purchasing a hand held mirror to use with a larger wall mirror.       Checking for Skin Cancer  You can find cancer early by checking your skin each month. There are 3 kinds of skin cancer. They are melanoma, basal cell carcinoma, and squamous cell carcinoma. Doing monthly skin checks is the best way to find new marks or skin changes. Follow the instructions below for checking your skin.   The ABCDEs of checking moles for melanoma   Check your moles or growths for signs of melanoma using ABCDE:   Asymmetry: the sides of the mole or growth don t match  Border: the edges are ragged, notched, or blurred  Color: the color within the mole or growth varies  Diameter: the mole or growth is larger than 6 mm (size of a pencil eraser)  Evolving: the size, shape, or color of the mole or growth is changing (evolving is not shown in the images below)    Checking for other types of skin cancer  Basal cell carcinoma or squamous cell carcinoma have symptoms such as:     A spot or mole that looks different from all other marks on your skin  Changes in how an area feels, such as itching, tenderness, or pain  Changes in the skin's surface, such as oozing, bleeding, or scaliness  A sore that does not heal  New swelling or redness beyond the border of a  mole    Who s at risk?  Anyone can get skin cancer. But you are at greater risk if you have:   Fair skin, light-colored hair, or light-colored eyes  Many moles or abnormal moles on your skin  A history of sunburns from sunlight or tanning beds  A family history of skin cancer  A history of exposure to radiation or chemicals  A weakened immune system  If you have had skin cancer in the past, you are at risk for recurring skin cancer.   How to check your skin  Do your monthly skin checkups in front of a full-length mirror. Check all parts of your body, including your:   Head (ears, face, neck, and scalp)  Torso (front, back, and sides)  Arms (tops, undersides, upper, and lower armpits)  Hands (palms, backs, and fingers, including under the nails)  Buttocks and genitals  Legs (front, back, and sides)  Feet (tops, soles, toes, including under the nails, and between toes)  If you have a lot of moles, take digital photos of them each month. Make sure to take photos both up close and from a distance. These can help you see if any moles change over time.   Most skin changes are not cancer. But if you see any changes in your skin, call your doctor right away. Only he or she can diagnose a problem. If you have skin cancer, seeing your doctor can be the first step toward getting the treatment that could save your life.   [employer] last reviewed this educational content on 4/1/2019 2000-2020 [employer]. [address] [place] [address], [place] [address]. All rights reserved. This information is not intended as a substitute for professional medical care. Always follow your healthcare professional's instructions.       When should I call my doctor?  If you are worsening or not improving, please, contact us or seek urgent care as noted below.     Who should I call with questions (adults)?    [facility] [phone]  For urgent needs outside of business hours call the [facility] at  [phone] and ask for the dermatology resident on call to be paged  If this is a medical emergency and you are unable to reach an ER, Call 911      If you need a prescription refill, please contact your pharmacy. Refills are approved or denied by our Physicians during normal business hours, Monday through Friday.  Per office policy, refills will not be granted if you have not been seen within the past year (or sooner depending on the condition).

## 2025-03-04 NOTE — LETTER
3/4/2025      Janay Tam  [address] [place] [address]      Dear Colleague,    Thank you for referring your patient, Janay Tam, to the [facility]. Please see a copy of my visit note below.    [facility] Dermatology Note  Encounter Date: Mar 4, 2025  Office Visit     Dermatology Problem List:  Last TBSE 11/5/24, recommend yearly     1. Verruca vulgaris  - bilateral legs x 2, s/p LN2 4/2/21  2. ISKs  - s/p cryo 4/2/21  3. AKs  - right upper forehead x 1 and left upper forehead x 1, s/p cryo 11/7/23  - Efudex/calcipotriene initiated to face 11/5/24     FHx: Skin cancer - unknown type (sister).  SHx: Pt has 3 children.    ____________________________________________    Assessment & Plan:    # Actinic keratosis, hx of diffusely through the forehead and temples. (Now just left cheek x 2 and right forehead)  continue field therapy with topical Chemotherapy on the left cheek  continue calcipotriene 0.005% cream bid  mixed with equal parts Efudex 5% cream twice daily for five-ten days. Discussed possible side effects of irritation, burning or pruritus.   Teratogenic.   Keep cream away from pets.   - cryotherapy performed to R upper forehead today, see procedure note below          Procedures Performed:   Cryotherapy procedure note: After verbal consent and discussion of risks and benefits including but no limited to dyspigmentation/scar, blister, and pain, 1 AK was(were) treated with 1-2mm freeze border for 2 cycles with liquid nitrogen on the right upper forehead. Post cryotherapy instructions were provided.       Follow-up: 8 month(s) in-person, or earlier for new or changing lesions    Staff and Scribe:   Scribe Disclosure:   By signing my name below, I, Vanna Rosenberg, attest that this documentation has been prepared under the direction and in the presence of Christie Hanson PA-C.  - Electronically Signed: Vanna Rosenberg 03/04/25       Provider  Disclosure:  I agree with above History, Review of Systems, Physical exam and Plan.  I have reviewed the content of the documentation and have edited it as needed. I have personally performed the services documented here and the documentation accurately represents those services and the decisions I have made.      Electronically signed by:  All risks, benefits and alternatives were discussed with patient.  Patient is in agreement and understands the assessment and plan.  All questions were answered.  Sun Screen Education was given.   Return to Clinic in 8 months or sooner as needed.   Christie Hanson PA-C      ____________________________________________    CC: Derm Problem ( follow up after efudex use on face)    HPI:  Ms. Janay Tam is a(n) 44 year old female who presents today for follow-up  for actinic keratoses.    Patient reports her face improved initially when using efudex . The redness started after completing the treatment and taking time to resolve.     Patient is otherwise feeling well, without additional skin concerns.    Labs Reviewed:  N/A    Physical exam:  Vitals: There were no vitals taken for this visit.  GEN: This is a well developed, well-nourished female in no acute distress, in a pleasant mood.    SKIN: Focused examination of the face was performed.  - There is an erythematous macule with overyling adherent scale on the R upper forehead.  - 2 faint gritty papules on L cheek  - No other lesions of concern on areas examined.       Medications:  Current Outpatient Medications   Medication Sig Dispense Refill     famotidine (PEPCID) 10 MG tablet Take 10 mg by mouth as needed.       MULTIVITAMINS OR TABS Take by mouth. 100 1 YEAR     nystatin-triamcinolone (MYCOLOG II) 866701-9.1 UNIT/GM-% external cream        Omega-3 Fatty Acids (FISH OIL PO) Take by mouth.       phentermine (ADIPEX-P) 37.5 MG tablet Take 1 tablet every day by oral route.       triamcinolone (KENALOG) 0.1 % external cream APPLY  TO THE AFFECTED AREA(S) BY TOPICAL ROUTE 2 TIMES PER DAY IN THE MORNING AND EVENING for 3 days maximum at a time 15 g 1     venlafaxine (EFFEXOR) 25 MG tablet Take 25mg daily. Then two weeks after menstrual cycle starts, take 50mg daily for 14 days until next menstrual cycle. 134 tablet 4     calcipotriene (DOVONOX) 0.005 % external cream Mix efudex + calcipotriene equally. Apply BID x 5-10 days. Stop when skin gets red. (Patient not taking: Reported on 3/4/2025) 60 g 1     fluorouracil (EFUDEX) 5 % external cream Mix equally with calcipotriene cream. Apply BID x 5-10 days to the face. Stop when skin gets red. (Patient not taking: Reported on 3/4/2025) 40 g 0     No current facility-administered medications for this visit.      Past Medical History:   Patient Active Problem List   Diagnosis     Allergic rhinitis     CARDIOVASCULAR SCREENING; LDL GOAL LESS THAN 160     Anxiety     Gastroesophageal reflux disease without esophagitis     Stress incontinence in female     Irritable bowel syndrome     Past Medical History:   Diagnosis Date     Allergic rhinitis, cause unspecified     Allergic rhinitis     Cytomegaloviral disease (H) 2010    Second child with hearing loss. 6/14 IgM neg, IgG pos     Depressive disorder, not elsewhere classified     use effexor in the past - more seasonal     Generalized anxiety disorder      GERD (gastroesophageal reflux disease)      Papanicolaou smear of cervix with atypical squamous cells of undetermined significance (ASC-US) 2001    Unsure of outcome     Postpartum depression     with first delivery, not currently medicated     Testing of female for genetic disease carrier status 06/2023    Variants of uncertain signif: BARD1: c.57G>C (p.[id number]). PDGFRA: c.1450G>A (p.[id number])        CC No referring provider defined for this encounter. on close of this encounter.      Again, thank you for allowing me to participate in the care of your patient.        Sincerely,        Christie Holden  CAPRICE Hanson    Electronically signed

## 2025-03-13 ENCOUNTER — HOSPITAL ENCOUNTER (OUTPATIENT)
Dept: MAMMOGRAPHY | Facility: CLINIC | Age: 45
Discharge: HOME | End: 2025-03-13
Attending: FAMILY MEDICINE
Payer: COMMERCIAL

## 2025-03-13 DIAGNOSIS — Z12.31 VISIT FOR SCREENING MAMMOGRAM: ICD-10-CM

## 2025-03-13 PROCEDURE — 77063 BREAST TOMOSYNTHESIS BI: CPT

## 2025-03-13 PROCEDURE — 77067 SCR MAMMO BI INCL CAD: CPT

## 2025-03-26 RX ORDER — ESCITALOPRAM OXALATE 5 MG/1
1 TABLET ORAL
COMMUNITY
Start: 2025-02-16 | End: 2025-04-02

## 2025-03-26 RX ORDER — METRONIDAZOLE 7.5 MG/G
GEL VAGINAL
COMMUNITY

## 2025-03-26 RX ORDER — ESTRADIOL 0.1 MG/G
CREAM VAGINAL
COMMUNITY

## 2025-03-26 RX ORDER — NYSTATIN 100000 U/G
OINTMENT TOPICAL
COMMUNITY

## 2025-03-26 RX ORDER — DICLOFENAC SODIUM 75 MG/1
TABLET, DELAYED RELEASE ORAL
COMMUNITY
End: 2025-04-02

## 2025-03-26 RX ORDER — ESCITALOPRAM OXALATE 10 MG/1
1 TABLET ORAL
COMMUNITY
Start: 2025-03-10

## 2025-03-26 NOTE — PROGRESS NOTES
SUBJECTIVE:                                                   Janay Tam is a 44 year old female who presents to clinic today for the following health issue(s):  Patient presents with:  Menopausal Sx      Additional information: 25-30 day regular, hx of polyps. Bleeding a lot in between cycles. Night sweat, bone aches, general anxiety, fatigue, brain fog, weight gain and breast tenderness      HPI:  Patient of Dr. Raya --here today to discuss perimenopause.  Has noticed a worsening of symptoms that she considers hormonal over the last several months.  Noticing more night sweats, more weight gain, more bloating, increase in breast tenderness around periods, increase in anxiety, fatigue and brain fog.    Continues to have fairly regular periods --usually 25-30d cycles; has started to have more intermenstrual bleeding/spotting in the last 3 cycles.  Using tampon/pad and lasting up to 5 days.  Had polypectomy with Dr. Raya in May 2023 and wonders about new polyps.  No vb/spotting with sex or exercise.  Some cramping.  Has also noted more pain/cramping with periods.  Wondering about hormone testing.  Actually had labs done with Dr. Sethi at [facility] in December which were all normal (FSH 5.4, E2 57, TSH 1.61).  Wondering about hormone replacement.  Was on OCPs in college but no hormones recently.   has vasectomy for contraception  Hx anxiety --has been on lexapro for the past few months with outside clinic.  Was on effexor but changed due to worsening anxiety.  Some sleep issues.  More brain fog and difficulty with focus  Staying active with almost daily walking (logs ~10 miles per week) and crossfit 3-4x/wk.  Eats healthy  Non smoker; no personal or family hx DVT/blood clot/stroke    Patient's last menstrual period was 2025 (approximate)..     Patient is sexually active, .  Using none for contraception.    reports that she has never smoked. She has never used smokeless tobacco.    STD  testing offered?  Declined    Health maintenance updated:  Yes    Today's PHQ-2 Score:       3/4/2025    10:49 AM   PHQ-2 ( 1999 [employer])   Q1: Little interest or pleasure in doing things 0   Q2: Feeling down, depressed or hopeless 0   PHQ-2 Score 0     Today's PHQ-9 Score:       5/13/2024     3:07 PM   PHQ-9 SCORE   PHQ-9 Total Score 1     Today's MILA-7 Score:       5/13/2024     3:07 PM   MILA-7 SCORE   Total Score 2       Problem list and histories reviewed & adjusted, as indicated.  Additional history: as documented.    Patient Active Problem List   Diagnosis    Allergic rhinitis    CARDIOVASCULAR SCREENING; LDL GOAL LESS THAN 160    Anxiety    Gastroesophageal reflux disease without esophagitis    Stress incontinence in female     Past Surgical History:   Procedure Laterality Date    C IUD,MIRENA  12/2010    COLONOSCOPY N/A 11/7/2024    Procedure: Colonoscopy;  Surgeon: Albina Mascorro MD;  Location:  GI    CYSTOSCOPY, SLING TRANSOBTURATOR N/A 8/5/2020    Procedure: MIDURETHRAL SLING WITH DIAGNOSTIC CYSTOSCOPY;  Surgeon: Sanjeev Pozo MD;  Location:  OR      Social History     Tobacco Use    Smoking status: Never    Smokeless tobacco: Never   Substance Use Topics    Alcohol use: Not Currently     Alcohol/week: 0.0 standard drinks of alcohol     Comment: very rare      Problem (# of Occurrences) Relation (Name,Age of Onset)    Parkinsonism (1) Mother    Arthritis (1) Paternal Grandmother    Depression (1) Mother    Diabetes (1) Maternal Grandfather    Hypertension (1) Mother    Neurologic Disorder (2) Sister (Dagmar): mental health, Brother: mental health    Osteoporosis (1) Paternal Grandmother    Psychotic Disorder (1) Mother    Cerebrovascular Disease (2) Father, Paternal Grandmother    Thyroid Disease (2) Mother, Sister    Breast Cancer (2) Maternal Cousin (Veronica), Maternal Cousin    C.A.D. (2) Father: heart problems, Maternal Grandfather: heart problems    Genetic Disease (1) Sister (Dagmar)  "   Hyperlipidemia (1) Mother    Skin Cancer (2) Sister (Dagmar), Maternal Cousin (Veronica)    Uterine Cancer (1) Maternal Grandmother (83)              Current Outpatient Medications   Medication Sig Dispense Refill    calcipotriene (DOVONOX) 0.005 % external cream Mix efudex + calcipotriene equally. Apply BID x 5-10 days. Stop when skin gets red. 60 g 1    escitalopram (LEXAPRO) 10 MG tablet Take 1 tablet by mouth daily at 2 pm.      estradiol (ESTRACE) 0.1 MG/GM vaginal cream       fluorouracil (EFUDEX) 5 % external cream Mix equally with calcipotriene cream. Apply BID x 5-10 days to the face. Stop when skin gets red. 40 g 0    fluticasone (FLONASE) 50 MCG/ACT nasal spray use 1 spray intranasally 2 times per day in each nostril      levonorgestrel-ethinyl estradiol (AVIANE) 0.1-20 MG-MCG tablet Take 1 tablet by mouth daily. 90 tablet 0    metroNIDAZOLE (METROGEL) 0.75 % vaginal gel       montelukast (SINGULAIR) 10 MG tablet take one (1) tablet by mouth at bedtime      Multiple Vitamin (MULTIVITAMIN ADULT PO)       nystatin (MYCOSTATIN) 570417 UNIT/GM external ointment       nystatin-triamcinolone (MYCOLOG II) 718508-5.1 UNIT/GM-% external cream       Omega-3 Fatty Acids (FISH OIL PO) Take by mouth.      triamcinolone (KENALOG) 0.1 % external cream APPLY TO THE AFFECTED AREA(S) BY TOPICAL ROUTE 2 TIMES PER DAY IN THE MORNING AND EVENING for 3 days maximum at a time 15 g 1     No current facility-administered medications for this visit.     Allergies   Allergen Reactions    Cat Dander Itching    Clindamycin     Milk (Cow) Other (See Comments)    Pcn [Penicillins]     Wheat Other (See Comments)    Penicillin G Rash       ROS:  12 point review of systems negative other than symptoms noted below or in the HPI.  No urinary frequency or dysuria, bladder or kidney problems, Normal menstrual cycles      OBJECTIVE:     BP 90/60   Ht 1.626 m (5' 4\")   Wt 70.6 kg (155 lb 9.6 oz)   LMP 03/12/2025 (Approximate)   Breastfeeding " No   BMI 26.71 kg/m    Body mass index is 26.71 kg/m .    Exam:  Constitutional:  Appearance: Well nourished, well developed alert, in no acute distress  Neurologic:  Mental Status:  Oriented X3.  Normal strength and tone, sensory exam grossly normal, mentation intact and speech normal.    Psychiatric:  Mentation appears normal and affect normal/bright.     In-Clinic Test Results:  No results found for this or any previous visit (from the past 24 hours).    ASSESSMENT/PLAN:                                                        ICD-10-CM    1. Irregular intermenstrual bleeding  N92.1 US Transvaginal Pelvic Non-OB      2. Perimenopausal symptom  N95.1 levonorgestrel-ethinyl estradiol (AVIANE) 0.1-20 MG-MCG tablet          Patient Instructions   Long discussion today regarding hormonal changes with perimenopause and aging.  Discussed the increased variation of hormones within a woman's cycle and overall decline in estrogen that can contribute to many symptoms associated with perimenopause and menopause.  Discussed changes in metabolism that also happen and importance of both healthy eating and regular exercise to maintain healthy and comfortable weight.  Discussed indications for HRT and history behind hormone usage.  Discussed WHI trial and where we are now with HRT including safety profile in young women nearing or shortly after menopause.  Janay understands that hormones will not completely eliminate all of her current symptoms but should help with many and those that are specifically hormonal in nature.  Important to continue with healthy lifestyle, continue with her therapist, continue with mindfulness and mental exercises, etc. As outside factors/influences can also lead to many of her reported symptoms as well.  Will Sunday start with low dose OCP with upcoming menses.  Will repeat pelvic ultrasound due to history of endometrial polyps and intermenstrual bleeding/spotting.  Will follow up with Dr. Raya at  time of ultrsaound.  Follow up with Dr. Raya for yearly exam later this spring.    40min spent on day of visit with chart review, patient visit and counseling, documentation and planning.    Juani Acuna MD  [facility]

## 2025-04-02 ENCOUNTER — OFFICE VISIT (OUTPATIENT)
Dept: OBGYN | Facility: CLINIC | Age: 45
End: 2025-04-02
Payer: COMMERCIAL

## 2025-04-02 VITALS
DIASTOLIC BLOOD PRESSURE: 60 MMHG | WEIGHT: 155.6 LBS | BODY MASS INDEX: 26.56 KG/M2 | HEIGHT: 64 IN | SYSTOLIC BLOOD PRESSURE: 90 MMHG

## 2025-04-02 DIAGNOSIS — N92.1 IRREGULAR INTERMENSTRUAL BLEEDING: Primary | ICD-10-CM

## 2025-04-02 DIAGNOSIS — N95.1 PERIMENOPAUSAL SYMPTOM: ICD-10-CM

## 2025-04-02 PROCEDURE — 3074F SYST BP LT 130 MM HG: CPT | Performed by: OBSTETRICS & GYNECOLOGY

## 2025-04-02 PROCEDURE — 3078F DIAST BP <80 MM HG: CPT | Performed by: OBSTETRICS & GYNECOLOGY

## 2025-04-02 PROCEDURE — 90715 TDAP VACCINE 7 YRS/> IM: CPT | Performed by: OBSTETRICS & GYNECOLOGY

## 2025-04-02 PROCEDURE — 99215 OFFICE O/P EST HI 40 MIN: CPT | Mod: 25 | Performed by: OBSTETRICS & GYNECOLOGY

## 2025-04-02 PROCEDURE — 90651 9VHPV VACCINE 2/3 DOSE IM: CPT | Performed by: OBSTETRICS & GYNECOLOGY

## 2025-04-02 PROCEDURE — 90472 IMMUNIZATION ADMIN EACH ADD: CPT | Performed by: OBSTETRICS & GYNECOLOGY

## 2025-04-02 PROCEDURE — 90471 IMMUNIZATION ADMIN: CPT | Performed by: OBSTETRICS & GYNECOLOGY

## 2025-04-02 PROCEDURE — G2211 COMPLEX E/M VISIT ADD ON: HCPCS | Performed by: OBSTETRICS & GYNECOLOGY

## 2025-04-02 RX ORDER — FLUTICASONE PROPIONATE 50 MCG
SPRAY, SUSPENSION (ML) NASAL
COMMUNITY
Start: 2025-03-27

## 2025-04-02 RX ORDER — LEVONORGESTREL/ETHIN.ESTRADIOL 0.1-0.02MG
1 TABLET ORAL DAILY
Qty: 90 TABLET | Refills: 0 | Status: SHIPPED | OUTPATIENT
Start: 2025-04-02

## 2025-04-02 RX ORDER — MONTELUKAST SODIUM 10 MG/1
TABLET ORAL
COMMUNITY
Start: 2025-03-27

## 2025-04-02 NOTE — PATIENT INSTRUCTIONS
Long discussion today regarding hormonal changes with perimenopause and aging.  Discussed the increased variation of hormones within a woman's cycle and overall decline in estrogen that can contribute to many symptoms associated with perimenopause and menopause.  Discussed changes in metabolism that also happen and importance of both healthy eating and regular exercise to maintain healthy and comfortable weight.  Discussed indications for HRT and history behind hormone usage.  Discussed WHI trial and where we are now with HRT including safety profile in young women nearing or shortly after menopause.  Janay understands that hormones will not completely eliminate all of her current symptoms but should help with many and those that are specifically hormonal in nature.  Important to continue with healthy lifestyle, continue with her therapist, continue with mindfulness and mental exercises, etc. As outside factors/influences can also lead to many of her reported symptoms as well.  Will Sunday start with low dose OCP with upcoming menses.  Will repeat pelvic ultrasound due to history of endometrial polyps and intermenstrual bleeding/spotting.  Will follow up with Dr. Raya at time of ultrsaound.  Follow up with Dr. Raya for yearly exam later this spring.

## 2025-05-08 ENCOUNTER — PATIENT OUTREACH (OUTPATIENT)
Dept: CARE COORDINATION | Facility: CLINIC | Age: 45
End: 2025-05-08
Payer: COMMERCIAL

## 2025-05-20 NOTE — PROGRESS NOTES
SUBJECTIVE:                                                   Janay Tam is a 44 year old female who presents to clinic today for the following health issue(s):  No chief complaint on file.      Additional information: ***    HPI:  ***    Patient's last menstrual period was 2025 (approximate)..     Patient {is/is not:952534} sexually active, .  Using {PLC CONTRACEPTION:405910} for contraception.    reports that she has never smoked. She has never used smokeless tobacco.  {Tobacco Cessation -- Delete if patient is a non-smoker:962028}  STD testing offered?  {PLC GC/CHLAMYDIA:689635}    Health maintenance updated:  {yes no:435545}  Care Gaps  Close care gaps  Overdue     MAY 13  2025 YEARLY PREVENTIVE VISIT (Yearly)  Last completed: May 13, 2024   Never done ADVANCE CARE PLANNING (Every 5 Years)   Never done HEPATITIS C SCREENING (Once)   Never done HEPATITIS B IMMUNIZATION (1 of 3 - 19+ 3-dose series)      Upcoming     2025 HPV IMMUNIZATION (3 - 3-dose SCDM series)  Last completed: 2025   MAR 13  2026 MAMMO SCREENING (Yearly)  Last completed: Mar 13, 2025   DEC 8  2026 HPV TEST (Once)  Last completed: Dec 8, 2021   DEC 8  2026 PAP (Every 5 Years)   Last completed: Dec 8, 2021   MAY 22  2027 DIABETES SCREENING (Every 3 Years)  Last completed: May 22, 2024   MAY 22  2029 LIPID (Every 5 Years)  Last completed: May 22, 2024   AUG 31  2029 ZOSTER IMMUNIZATION (1 of 2)     2034 COLORECTAL CANCER SCREENING (COLONOSCOPY) (Every 10 Years)  Last completed:  DTAP/TDAP/TD IMMUNIZATION (6 - Td or Tdap)  Last completed: 2025     Today's PHQ-2 Score:       3/4/2025    10:49 AM   PHQ-2 (  [employer])   Q1: Little interest or pleasure in doing things 0   Q2: Feeling down, depressed or hopeless 0   PHQ-2 Score 0     Today's PHQ-9 Score:       2024     3:07 PM   PHQ-9 SCORE   PHQ-9 Total Score 1     Today's MILA-7 Score:       2024     3:07 PM   MILA-7 SCORE    Total Score 2       Problem list and histories reviewed & adjusted, as indicated.  Additional history: as documented.    Patient Active Problem List   Diagnosis    Allergic rhinitis    CARDIOVASCULAR SCREENING; LDL GOAL LESS THAN 160    Anxiety    Gastroesophageal reflux disease without esophagitis    Stress incontinence in female     Past Surgical History:   Procedure Laterality Date    C IUD,MIRENA  12/2010    COLONOSCOPY N/A 11/7/2024    Procedure: Colonoscopy;  Surgeon: Albina Mascorro MD;  Location:  GI    CYSTOSCOPY, SLING TRANSOBTURATOR N/A 8/5/2020    Procedure: MIDURETHRAL SLING WITH DIAGNOSTIC CYSTOSCOPY;  Surgeon: Sanjeev Pozo MD;  Location:  OR      Social History     Tobacco Use    Smoking status: Never    Smokeless tobacco: Never   Substance Use Topics    Alcohol use: Not Currently     Alcohol/week: 0.0 standard drinks of alcohol     Comment: very rare      Problem (# of Occurrences) Relation (Name,Age of Onset)    Parkinsonism (1) Mother    Arthritis (1) Paternal Grandmother    Depression (1) Mother    Diabetes (1) Maternal Grandfather    Hypertension (1) Mother    Neurologic Disorder (2) Sister (Dagmar): mental health, Brother: mental health    Osteoporosis (1) Paternal Grandmother    Psychotic Disorder (1) Mother    Cerebrovascular Disease (2) Father, Paternal Grandmother    Thyroid Disease (2) Mother, Sister    Breast Cancer (2) Maternal Cousin (Veronica), Maternal Cousin    C.A.D. (2) Father: heart problems, Maternal Grandfather: heart problems    Genetic Disease (1) Sister (Dagmar)    Hyperlipidemia (1) Mother    Skin Cancer (2) Sister (Dagmar), Maternal Cousin (Veronica)    Uterine Cancer (1) Maternal Grandmother (83)              Current Outpatient Medications   Medication Sig Dispense Refill    calcipotriene (DOVONOX) 0.005 % external cream Mix efudex + calcipotriene equally. Apply BID x 5-10 days. Stop when skin gets red. 60 g 1    escitalopram (LEXAPRO) 10 MG tablet Take 1  tablet by mouth daily at 2 pm.      estradiol (ESTRACE) 0.1 MG/GM vaginal cream       fluorouracil (EFUDEX) 5 % external cream Mix equally with calcipotriene cream. Apply BID x 5-10 days to the face. Stop when skin gets red. 40 g 0    fluticasone (FLONASE) 50 MCG/ACT nasal spray use 1 spray intranasally 2 times per day in each nostril      levonorgestrel-ethinyl estradiol (AVIANE) 0.1-20 MG-MCG tablet Take 1 tablet by mouth daily. 90 tablet 0    metroNIDAZOLE (METROGEL) 0.75 % vaginal gel       montelukast (SINGULAIR) 10 MG tablet take one (1) tablet by mouth at bedtime      Multiple Vitamin (MULTIVITAMIN ADULT PO)       nystatin (MYCOSTATIN) 934757 UNIT/GM external ointment       nystatin-triamcinolone (MYCOLOG II) 787164-9.1 UNIT/GM-% external cream       Omega-3 Fatty Acids (FISH OIL PO) Take by mouth.      triamcinolone (KENALOG) 0.1 % external cream APPLY TO THE AFFECTED AREA(S) BY TOPICAL ROUTE 2 TIMES PER DAY IN THE MORNING AND EVENING for 3 days maximum at a time 15 g 1     No current facility-administered medications for this visit.     Allergies   Allergen Reactions    Cat Dander Itching    Clindamycin     Milk (Cow) Other (See Comments)    Pcn [Penicillins]     Wheat Other (See Comments)    Penicillin G Rash       ROS:  {[facility] ROSGYN:294637}  {ROS - :237401}      OBJECTIVE:     LMP 03/12/2025 (Approximate)   There is no height or weight on file to calculate BMI.    Exam:  {[facility] EXAM CHOICES:717418}     In-Clinic Test Results:  No results found for this or any previous visit (from the past 24 hours).    ASSESSMENT/PLAN:                                                      No diagnosis found.    There are no Patient Instructions on file for this visit.    ***    Becky Corrigan Masters, [facility] FOR [facility]

## 2025-05-21 ENCOUNTER — OFFICE VISIT (OUTPATIENT)
Dept: OBGYN | Facility: CLINIC | Age: 45
End: 2025-05-21
Payer: COMMERCIAL

## 2025-05-21 ENCOUNTER — ANCILLARY PROCEDURE (OUTPATIENT)
Dept: ULTRASOUND IMAGING | Facility: CLINIC | Age: 45
End: 2025-05-21
Attending: OBSTETRICS & GYNECOLOGY
Payer: COMMERCIAL

## 2025-05-21 VITALS
HEIGHT: 63 IN | BODY MASS INDEX: 27.29 KG/M2 | WEIGHT: 154 LBS | DIASTOLIC BLOOD PRESSURE: 64 MMHG | SYSTOLIC BLOOD PRESSURE: 116 MMHG

## 2025-05-21 DIAGNOSIS — N92.1 IRREGULAR INTERMENSTRUAL BLEEDING: ICD-10-CM

## 2025-05-21 DIAGNOSIS — Z80.3 FAMILY HISTORY OF BREAST CANCER: ICD-10-CM

## 2025-05-21 DIAGNOSIS — R89.8 ABNORMAL GENETIC TEST: ICD-10-CM

## 2025-05-21 DIAGNOSIS — N95.1 PERIMENOPAUSAL SYMPTOM: Primary | ICD-10-CM

## 2025-05-21 DIAGNOSIS — Z01.419 ENCOUNTER FOR GYNECOLOGICAL EXAMINATION WITHOUT ABNORMAL FINDING: ICD-10-CM

## 2025-05-21 PROCEDURE — 76830 TRANSVAGINAL US NON-OB: CPT | Performed by: OBSTETRICS & GYNECOLOGY

## 2025-05-21 RX ORDER — LEVONORGESTREL/ETHIN.ESTRADIOL 0.1-0.02MG
1 TABLET ORAL DAILY
Qty: 90 TABLET | Refills: 4 | Status: SHIPPED | OUTPATIENT
Start: 2025-05-21

## 2025-05-21 RX ORDER — ESTRADIOL 0.1 MG/G
CREAM VAGINAL
Qty: 42.5 G | Refills: 4 | Status: SHIPPED | OUTPATIENT
Start: 2025-05-22

## 2025-05-21 NOTE — PATIENT INSTRUCTIONS
-Daily total calcium intake (between food/supplements) should be 1000mg which equates to 3-4 servings calcium containing food per day; VItamin D 2000IU. Not only is this important for your bone health as you age, but also vitamin D has a role in decreasing breast cancer risk.   Foods rich in calcium are: milk, cheese, yogurt, seafood, sardines and canned salmon, leafy green vegetables such as diane greens, spinach and kale, beans and lentils, almonds, seeds (poppy, sesame, celery, concetta), rhubarb, dried fruit such as figs, whey protein, tofu and edamame, amaranth, other foods with added calcium such as orange juice and some cereals.   If adequate amount not taken in diet, then a supplement may be needed.     -I also recommend increasing your dietary fiber by starting Metamucil (powder mixed in glass of water) once to twice daily      -General ways to lower breast ca risk:  Limit alcohol intake  Take vitamin D  Keep weight in normal range

## 2025-05-21 NOTE — PROGRESS NOTES
Janay is a 44 year old  female who presents for annual exam.     Besides routine health maintenance,  she would like to discuss birth control .    History of Present Illness-    The patient reports changes in her bleeding profile since starting estrogen therapy. She experiences spotting during the second week of her cycle, accompanied by backache and cramps. The first month of therapy resulted in bleeding lasting approximately 2.5 weeks, while the current month has seen bleeding for about 2 weeks. She started the pills on a  following the end of her period on a Friday. The patient mentions that being on estrogen has helped her mentally, making her feel more even. She previously experienced similar spotting while on birth control in her 30s, which she attributed to the birth control.    Weight Gain and Lifestyle:  The patient expresses frustration with weight gain, although she notes it is not drastic. She attributes this to her eating habits and finds it harder to manage her weight. Despite this, she engages in CrossFit and weightlifting, noting an increase in the weights she can lift.    PMS and Antidepressant Use:  The patient previously discussed PMS management and changed her antidepressant medication to lexapro from Effexor due to vivid dreams affecting her sleep. She reports feeling more even with lexapro and notes that her PMS symptoms have improved, with less breast tenderness and anxiety.    Family History and Genetic Concerns:  The patient is mindful of her family history of breast cancer and a variant of unknown significance. She is aware of the potential risks associated with hormone therapy but values the quality of life improvements she experiences from it.    Appointments with Primary Care Doctors/Providers:  The patient has not yet followed up with a primary care doctor due to an insurance change but plans to have a primary care provider by age 49.      Colonoscopy:  The patient underwent  a colonoscopy on  of the previous year and received a 10-year all-clear result.      General Health:  The patient reports feeling well overall, with improved sleep and reduced anxiety since starting hormonal therapy. She does not report any significant downsides apart from the bleeding issues.        GYNECOLOGIC HISTORY:    Patient's last menstrual period was 2025 (approximate).    Regular menses? yes  Menses every 30 days.  Length of menses: 3 days    Her current contraception method is: oral contraceptives.  She  reports that she has never smoked. She has never used smokeless tobacco.    Patient is sexually active.  STD testing offered?  Declined  Last PHQ-9 score on record =       2024     3:07 PM   PHQ-9 SCORE   PHQ-9 Total Score 1     Last GAD7 score on record =       2024     3:07 PM   MILA-7 SCORE   Total Score 2     Alcohol Score = 0    HEALTH MAINTENANCE:  Care Gaps  Close care gaps  Overdue     Never done ADVANCE CARE PLANNING (Every 5 Years)   Never done HEPATITIS C SCREENING (Once)   Never done HEPATITIS B IMMUNIZATION (1 of 3 - 19+ 3-dose series)      Upcoming     2025 HPV IMMUNIZATION (3 - 3-dose SCDM series)  Last completed: 2025   MAR 13  2026 MAMMO SCREENING (Yearly)  Last completed: Mar 13, 2025   MAY 21  2026 YEARLY PREVENTIVE VISIT (Yearly)  Last completed: May 21, 2025   DEC 8  2026 HPV TEST (Once)  Last completed: Dec 8, 2021   DEC 8  2026 PAP (Every 5 Years)   Last completed: Dec 8, 2021   MAY 22  2027 DIABETES SCREENING (Every 3 Years)  Last completed: May 22, 2024   MAY 22  2029 LIPID (Every 5 Years)  Last completed: May 22, 2024   AUG 31  2029 ZOSTER IMMUNIZATION (1 of 2)     2034 COLORECTAL CANCER SCREENING (COLONOSCOPY) (Every 10 Years)  Last completed:  DTAP/TDAP/TD IMMUNIZATION (6 - Td or Tdap)  Last completed: 2025       Health maintenance updated:  yes    HISTORY:  OB History    Para Term   AB Living   3 3 3 0 0 3   SAB IAB Ectopic Multiple Live Births   0 0 0 0 3      # Outcome Date GA Lbr John/2nd Weight Sex Type Anes PTL Lv   3 Term 02/10/15 38w5d 01:15 / 03:05 3.912 kg (8 lb 10 oz) M Vag-Spont EPI  EDU      Apgar1: 8  Apgar5: 9   2 Term 10/09/10 39w0d  3.572 kg (7 lb 14 oz) M  EPI  EDU      Birth Comments: CMV+,  son with hearing impairment   1 Term 10/31/08 38w0d  3.289 kg (7 lb 4 oz) M    EDU       Patient Active Problem List   Diagnosis    Allergic rhinitis    CARDIOVASCULAR SCREENING; LDL GOAL LESS THAN 160    Anxiety    Gastroesophageal reflux disease without esophagitis    Stress incontinence in female     Past Surgical History:   Procedure Laterality Date    C IUD,MIRENA  2010    COLONOSCOPY N/A 2024    Procedure: Colonoscopy;  Surgeon: Albina Mascorro MD;  Location:  GI    CYSTOSCOPY, SLING TRANSOBTURATOR N/A 2020    Procedure: MIDURETHRAL SLING WITH DIAGNOSTIC CYSTOSCOPY;  Surgeon: Sanjeev Pozo MD;  Location:  OR      Social History     Tobacco Use    Smoking status: Never    Smokeless tobacco: Never   Substance Use Topics    Alcohol use: Not Currently     Alcohol/week: 0.0 standard drinks of alcohol     Comment: very rare      Problem (# of Occurrences) Relation (Name,Age of Onset)    Parkinsonism (1) Mother    Arthritis (1) Paternal Grandmother    Depression (1) Mother    Diabetes (1) Maternal Grandfather    Hypertension (1) Mother    Neurologic Disorder (2) Sister (Dagmar): mental health, Brother: mental health    Osteoporosis (1) Paternal Grandmother    Psychotic Disorder (1) Mother    Cerebrovascular Disease (2) Father, Paternal Grandmother    Thyroid Disease (2) Mother, Sister    Breast Cancer (2) Maternal Cousin (Veronica), Maternal Cousin    C.A.D. (2) Father: heart problems, Maternal Grandfather: heart problems    Genetic Disease (1) Sister (Dagmar)    Hyperlipidemia (1) Mother    Skin Cancer (2) Sister (Dagmar), Maternal Cousin (Veronica)    Uterine  "Cancer (1) Maternal Grandmother (83)              Current Outpatient Medications   Medication Sig Dispense Refill    [START ON 5/22/2025] estradiol (ESTRACE) 0.1 MG/GM vaginal cream Place 1g into vagina at bedtime twice per week. 42.5 g 4    levonorgestrel-ethinyl estradiol (AVIANE) 0.1-20 MG-MCG tablet Take 1 tablet by mouth daily. 90 tablet 4    calcipotriene (DOVONOX) 0.005 % external cream Mix efudex + calcipotriene equally. Apply BID x 5-10 days. Stop when skin gets red. 60 g 1    escitalopram (LEXAPRO) 10 MG tablet Take 1 tablet by mouth daily at 2 pm.      fluorouracil (EFUDEX) 5 % external cream Mix equally with calcipotriene cream. Apply BID x 5-10 days to the face. Stop when skin gets red. 40 g 0    fluticasone (FLONASE) 50 MCG/ACT nasal spray use 1 spray intranasally 2 times per day in each nostril      metroNIDAZOLE (METROGEL) 0.75 % vaginal gel       montelukast (SINGULAIR) 10 MG tablet take one (1) tablet by mouth at bedtime      Multiple Vitamin (MULTIVITAMIN ADULT PO)       nystatin (MYCOSTATIN) 009945 UNIT/GM external ointment       nystatin-triamcinolone (MYCOLOG II) 660334-2.1 UNIT/GM-% external cream       Omega-3 Fatty Acids (FISH OIL PO) Take by mouth.      triamcinolone (KENALOG) 0.1 % external cream APPLY TO THE AFFECTED AREA(S) BY TOPICAL ROUTE 2 TIMES PER DAY IN THE MORNING AND EVENING for 3 days maximum at a time 15 g 1     No current facility-administered medications for this visit.     Allergies   Allergen Reactions    Cat Dander Itching    Clindamycin     Milk (Cow) Other (See Comments)    Pcn [Penicillins]     Wheat Other (See Comments)    Penicillin G Rash       Past medical, surgical, social and family histories were reviewed and updated in EPIC.    ROS:   12 point review of systems negative other than symptoms noted below or in the HPI.  No urinary frequency or dysuria, bladder or kidney problems    EXAM:  /64   Ht 1.6 m (5' 3\")   Wt 69.9 kg (154 lb)   LMP 04/28/2025 " (Approximate)   BMI 27.28 kg/m     BMI: Body mass index is 27.28 kg/m .    PHYSICAL EXAM:  Constitutional:   Appearance: Well nourished, well developed, alert, in no acute distress  Neck:  Lymph Nodes:  No lymphadenopathy present    Thyroid:  Gland size normal, nontender, no nodules or masses present  on palpation  Chest:  Respiratory Effort:  Breathing unlabored  Cardiovascular:    Heart: Auscultation:  Regular rate, normal rhythm, no murmurs present  Breasts: Inspection of Breasts:  No lymphadenopathy present., Palpation of Breasts and Axillae:  No masses present on palpation, no breast tenderness., Axillary Lymph Nodes:  No lymphadenopathy present., and No nodularity, asymmetry or nipple discharge bilaterally.  Gastrointestinal:   Abdominal Examination:  Abdomen nontender to palpation, tone normal without rigidity or guarding, no masses present, umbilicus without lesions   Liver and Spleen:  No hepatomegaly present, liver nontender to palpation    Hernias:  No hernias present  Lymphatic: Lymph Nodes:  No other lymphadenopathy present  Skin:  General Inspection:  No rashes present, no lesions present, no areas of  discoloration  Neurologic:    Mental Status:  Oriented X3.  Normal strength and tone, sensory exam                grossly normal, mentation intact and speech normal.    Psychiatric:   Mentation appears normal and affect normal/bright.         Pelvic Exam:  External Genitalia:     Normal appearance for age, no discharge present, no tenderness present, no inflammatory lesions present, color normal  Vagina:     Normal vaginal vault without central or paravaginal defects, no discharge present, no inflammatory lesions present, no masses present  Bladder:     Nontender to palpation  Urethra:   Urethral Body:  Urethra palpation normal, urethra structural support normal   Urethral Meatus:  No erythema or lesions present  Cervix:     Appearance healthy, no lesions present, nontender to palpation, no bleeding  present  Uterus:     Uterus: firm, normal sized and nontender, midplane in position.   Adnexa:     No adnexal tenderness present, no adnexal masses present  Perineum:     Perineum within normal limits, no evidence of trauma, no rashes or skin lesions present  Anus:     Anus within normal limits, no hemorrhoids present  Inguinal Lymph Nodes:     No lymphadenopathy present  Pubic Hair:     Normal pubic hair distribution for age  Genitalia and Groin:     No rashes present, no lesions present, no areas of discoloration, no masses present    ASSESSMENT:  44 year old female with satisfactory annual exam.    ICD-10-CM    1. Perimenopausal symptom  N95.1 levonorgestrel-ethinyl estradiol (AVIANE) 0.1-20 MG-MCG tablet     estradiol (ESTRACE) 0.1 MG/GM vaginal cream      2. Family history of breast cancer  Z80.3 MR Breast Bilateral w/o & w Contrast      3. Encounter for gynecological examination without abnormal finding  Z01.419       4. Abnormal genetic test  R89.8 MR Breast Bilateral w/o & w Contrast    FHx of breast cancer. Tested with VUS.          PLAN:  Assessment & Plan  Acute DUB on OCPs, SE of the medication.  -Bleeding change perimenopausal and now exacerbated by new OCP start.  Reviewed US findings with Janay. No polyps found on ultrasound. A little calcification in the lining of the uterus, but it is nonspecific and not concerning. No further action needed  - Continued current hormonal therapy for a few more months to allow the body to adjust. Considered switching to a different delivery method, such as a patch or IUD, if irregular bleeding persisted. A short-term plan to check back in two months with a follow-up appointment, which could be conducted via video visit if preferred.  -Refilled the OCP for the year.      Perimenopause:  - Current OCP  has helped improve mental well-being, anxiety, and sleep quality. No significant downside noted except for bleeding.  - Continued current hormone replacement therapy  regimen. Considered switching delivery method if irregular bleeding persisted after another month or two. Scheduled follow-up in two months for a video visit to reassess symptoms and discuss potential changes in hormone delivery methods if bleeding did not resolve.  - Risks and side effects: Discussed potential increased risk of breast cancer with extended hormone replacement therapy use due to family history and variant of unknown significance. Patient advised to weigh pros and cons of continuing hormone therapy.  -Doing well on vaginal estrogen, desires refill. Refilled for the year.    Preventative care:  - Family history of breast cancer noted; patient has a variant of unknown significance.  - Continued regular mammograms and MRIs for monitoring risk factors.  -Patient completed colonoscopy in November 2024 with a 10-year all-clear result.    Anxiety:  - Anxiety was currently well-managed with lexapro; no significant ups and downs reported.  - Continued current antidepressant medication regimen.    PMS:  - PMS symptoms improved with current birth control pills and antidepressant medication.  - Continued current treatment regimen.    Patient was informed of AI scribe and agreed to its use.    (33 minutes additional beyond that typical for preventative women's health exam was spent on the date of the encounter in medical decision making, rx management, chart review, history and/or exam, documentation, and patient counseling.          Becky Corrigan Masters, DO

## (undated) DEVICE — GOWN XXL 9575

## (undated) DEVICE — SU VICRYL 3-0 SH 27" J316H

## (undated) DEVICE — PACK TVT HYSTEROSCOPY SMA15HYFSE

## (undated) DEVICE — GLOVE PROTEXIS POWDER FREE SMT 7.5  2D72PT75X

## (undated) DEVICE — NDL COUNTER 10CT

## (undated) DEVICE — TUBING SUCTION 12"X1/4" N612

## (undated) DEVICE — LINEN TOWEL PACK X5 5464

## (undated) DEVICE — SYR 10ML FINGER CONTROL W/O NDL 309695

## (undated) DEVICE — TUBING IRRIG TUR Y TYPE 96" LF 6543-01

## (undated) DEVICE — TUBING IRRIG CYSTO/BLADDER SET 81" LF 2C4040

## (undated) DEVICE — SUCTION CANISTER MEDIVAC LINER 3000ML W/LID 65651-530

## (undated) DEVICE — SYR 10ML SLIP TIP W/O NDL

## (undated) DEVICE — NDL 22GA 1.5"

## (undated) RX ORDER — FENTANYL CITRATE 50 UG/ML
INJECTION, SOLUTION INTRAMUSCULAR; INTRAVENOUS
Status: DISPENSED
Start: 2024-11-07